# Patient Record
Sex: MALE | Race: WHITE | NOT HISPANIC OR LATINO | Employment: UNEMPLOYED | URBAN - METROPOLITAN AREA
[De-identification: names, ages, dates, MRNs, and addresses within clinical notes are randomized per-mention and may not be internally consistent; named-entity substitution may affect disease eponyms.]

---

## 2024-01-09 ENCOUNTER — HOSPITAL ENCOUNTER (INPATIENT)
Facility: CLINIC | Age: 49
LOS: 3 days | Discharge: HOME | End: 2024-01-12
Attending: FAMILY MEDICINE | Admitting: HOSPITALIST
Payer: MEDICAID

## 2024-01-09 DIAGNOSIS — F10.239 ALCOHOL DEPENDENCE WITH WITHDRAWAL WITH COMPLICATION (H): ICD-10-CM

## 2024-01-09 LAB
ALBUMIN SERPL BCG-MCNC: 5.1 G/DL (ref 3.5–5.2)
ALCOHOL BREATH TEST: 0 (ref 0–0.01)
ALP SERPL-CCNC: 86 U/L (ref 40–150)
ALT SERPL W P-5'-P-CCNC: 134 U/L (ref 0–70)
AMPHETAMINES UR QL SCN: NORMAL
ANION GAP SERPL CALCULATED.3IONS-SCNC: 12 MMOL/L (ref 7–15)
AST SERPL W P-5'-P-CCNC: 116 U/L (ref 0–45)
BARBITURATES UR QL SCN: NORMAL
BASOPHILS # BLD AUTO: 0 10E3/UL (ref 0–0.2)
BASOPHILS NFR BLD AUTO: 0 %
BENZODIAZ UR QL SCN: NORMAL
BILIRUB SERPL-MCNC: 0.4 MG/DL
BUN SERPL-MCNC: 13.8 MG/DL (ref 6–20)
BZE UR QL SCN: NORMAL
CALCIUM SERPL-MCNC: 9.7 MG/DL (ref 8.6–10)
CANNABINOIDS UR QL SCN: NORMAL
CHLORIDE SERPL-SCNC: 94 MMOL/L (ref 98–107)
CREAT SERPL-MCNC: 0.78 MG/DL (ref 0.67–1.17)
DEPRECATED HCO3 PLAS-SCNC: 27 MMOL/L (ref 22–29)
EGFRCR SERPLBLD CKD-EPI 2021: >90 ML/MIN/1.73M2
EOSINOPHIL # BLD AUTO: 0 10E3/UL (ref 0–0.7)
EOSINOPHIL NFR BLD AUTO: 0 %
ERYTHROCYTE [DISTWIDTH] IN BLOOD BY AUTOMATED COUNT: 14.5 % (ref 10–15)
ETHANOL SERPL-MCNC: <0.01 G/DL
FENTANYL UR QL: NORMAL
GLUCOSE SERPL-MCNC: 94 MG/DL (ref 70–99)
HCT VFR BLD AUTO: 48.5 % (ref 40–53)
HGB BLD-MCNC: 17.1 G/DL (ref 13.3–17.7)
IMM GRANULOCYTES # BLD: 0 10E3/UL
IMM GRANULOCYTES NFR BLD: 0 %
LYMPHOCYTES # BLD AUTO: 0.4 10E3/UL (ref 0.8–5.3)
LYMPHOCYTES NFR BLD AUTO: 9 %
MAGNESIUM SERPL-MCNC: 1.8 MG/DL (ref 1.7–2.3)
MCH RBC QN AUTO: 33.1 PG (ref 26.5–33)
MCHC RBC AUTO-ENTMCNC: 35.3 G/DL (ref 31.5–36.5)
MCV RBC AUTO: 94 FL (ref 78–100)
MONOCYTES # BLD AUTO: 0.6 10E3/UL (ref 0–1.3)
MONOCYTES NFR BLD AUTO: 14 %
NEUTROPHILS # BLD AUTO: 3.1 10E3/UL (ref 1.6–8.3)
NEUTROPHILS NFR BLD AUTO: 77 %
NRBC # BLD AUTO: 0 10E3/UL
NRBC BLD AUTO-RTO: 0 /100
OPIATES UR QL SCN: NORMAL
PCP QUAL URINE (ROCHE): NORMAL
PLATELET # BLD AUTO: 119 10E3/UL (ref 150–450)
POTASSIUM SERPL-SCNC: 4.3 MMOL/L (ref 3.4–5.3)
PROT SERPL-MCNC: 8.4 G/DL (ref 6.4–8.3)
RBC # BLD AUTO: 5.17 10E6/UL (ref 4.4–5.9)
SODIUM SERPL-SCNC: 133 MMOL/L (ref 135–145)
WBC # BLD AUTO: 4 10E3/UL (ref 4–11)

## 2024-01-09 PROCEDURE — 80053 COMPREHEN METABOLIC PANEL: CPT | Performed by: FAMILY MEDICINE

## 2024-01-09 PROCEDURE — 85025 COMPLETE CBC W/AUTO DIFF WBC: CPT | Performed by: FAMILY MEDICINE

## 2024-01-09 PROCEDURE — 82075 ASSAY OF BREATH ETHANOL: CPT | Performed by: FAMILY MEDICINE

## 2024-01-09 PROCEDURE — 36415 COLL VENOUS BLD VENIPUNCTURE: CPT | Performed by: FAMILY MEDICINE

## 2024-01-09 PROCEDURE — 80307 DRUG TEST PRSMV CHEM ANLYZR: CPT | Performed by: FAMILY MEDICINE

## 2024-01-09 PROCEDURE — 84100 ASSAY OF PHOSPHORUS: CPT | Performed by: HOSPITALIST

## 2024-01-09 PROCEDURE — 82077 ASSAY SPEC XCP UR&BREATH IA: CPT | Performed by: FAMILY MEDICINE

## 2024-01-09 PROCEDURE — 120N000002 HC R&B MED SURG/OB UMMC

## 2024-01-09 PROCEDURE — 250N000013 HC RX MED GY IP 250 OP 250 PS 637: Performed by: EMERGENCY MEDICINE

## 2024-01-09 PROCEDURE — 83735 ASSAY OF MAGNESIUM: CPT | Performed by: FAMILY MEDICINE

## 2024-01-09 PROCEDURE — 250N000013 HC RX MED GY IP 250 OP 250 PS 637: Performed by: FAMILY MEDICINE

## 2024-01-09 PROCEDURE — 99285 EMERGENCY DEPT VISIT HI MDM: CPT | Mod: 25 | Performed by: FAMILY MEDICINE

## 2024-01-09 PROCEDURE — 99285 EMERGENCY DEPT VISIT HI MDM: CPT | Performed by: FAMILY MEDICINE

## 2024-01-09 RX ORDER — LEVOTHYROXINE SODIUM 75 UG/1
75 TABLET ORAL DAILY
COMMUNITY
End: 2024-02-14 | Stop reason: DRUGHIGH

## 2024-01-09 RX ORDER — AMOXICILLIN 250 MG
1 CAPSULE ORAL 2 TIMES DAILY PRN
Status: DISCONTINUED | OUTPATIENT
Start: 2024-01-09 | End: 2024-01-12 | Stop reason: HOSPADM

## 2024-01-09 RX ORDER — NICOTINE 21 MG/24HR
1 PATCH, TRANSDERMAL 24 HOURS TRANSDERMAL DAILY
Status: DISCONTINUED | OUTPATIENT
Start: 2024-01-09 | End: 2024-01-12 | Stop reason: HOSPADM

## 2024-01-09 RX ORDER — GABAPENTIN 300 MG/1
600 CAPSULE ORAL EVERY 8 HOURS
Status: DISCONTINUED | OUTPATIENT
Start: 2024-01-13 | End: 2024-01-12 | Stop reason: HOSPADM

## 2024-01-09 RX ORDER — GABAPENTIN 100 MG/1
100 CAPSULE ORAL EVERY 8 HOURS
Status: DISCONTINUED | OUTPATIENT
Start: 2024-01-17 | End: 2024-01-12 | Stop reason: HOSPADM

## 2024-01-09 RX ORDER — SERTRALINE HYDROCHLORIDE 100 MG/1
50 TABLET, FILM COATED ORAL DAILY
COMMUNITY
Start: 2023-01-09 | End: 2024-01-18

## 2024-01-09 RX ORDER — AMOXICILLIN 250 MG
2 CAPSULE ORAL 2 TIMES DAILY PRN
Status: DISCONTINUED | OUTPATIENT
Start: 2024-01-09 | End: 2024-01-12 | Stop reason: HOSPADM

## 2024-01-09 RX ORDER — DIAZEPAM 5 MG
5-20 TABLET ORAL EVERY 30 MIN PRN
Status: DISCONTINUED | OUTPATIENT
Start: 2024-01-09 | End: 2024-01-10

## 2024-01-09 RX ORDER — HALOPERIDOL 5 MG/ML
2.5-5 INJECTION INTRAMUSCULAR EVERY 6 HOURS PRN
Status: DISCONTINUED | OUTPATIENT
Start: 2024-01-09 | End: 2024-01-12 | Stop reason: HOSPADM

## 2024-01-09 RX ORDER — CALCIUM CARBONATE 500 MG/1
1000 TABLET, CHEWABLE ORAL 4 TIMES DAILY PRN
Status: DISCONTINUED | OUTPATIENT
Start: 2024-01-09 | End: 2024-01-12 | Stop reason: HOSPADM

## 2024-01-09 RX ORDER — CLONIDINE HYDROCHLORIDE 0.1 MG/1
0.1 TABLET ORAL ONCE
Status: COMPLETED | OUTPATIENT
Start: 2024-01-09 | End: 2024-01-09

## 2024-01-09 RX ORDER — OLANZAPINE 5 MG/1
5-10 TABLET, ORALLY DISINTEGRATING ORAL EVERY 6 HOURS PRN
Status: DISCONTINUED | OUTPATIENT
Start: 2024-01-09 | End: 2024-01-12 | Stop reason: HOSPADM

## 2024-01-09 RX ORDER — ACETAMINOPHEN 650 MG/1
650 SUPPOSITORY RECTAL EVERY 4 HOURS PRN
Status: DISCONTINUED | OUTPATIENT
Start: 2024-01-09 | End: 2024-01-12 | Stop reason: HOSPADM

## 2024-01-09 RX ORDER — ACETAMINOPHEN 325 MG/1
650 TABLET ORAL EVERY 4 HOURS PRN
Status: DISCONTINUED | OUTPATIENT
Start: 2024-01-09 | End: 2024-01-12 | Stop reason: HOSPADM

## 2024-01-09 RX ORDER — ONDANSETRON 2 MG/ML
4 INJECTION INTRAMUSCULAR; INTRAVENOUS EVERY 6 HOURS PRN
Status: DISCONTINUED | OUTPATIENT
Start: 2024-01-09 | End: 2024-01-12 | Stop reason: HOSPADM

## 2024-01-09 RX ORDER — ONDANSETRON 4 MG/1
4 TABLET, ORALLY DISINTEGRATING ORAL EVERY 6 HOURS PRN
Status: DISCONTINUED | OUTPATIENT
Start: 2024-01-09 | End: 2024-01-12 | Stop reason: HOSPADM

## 2024-01-09 RX ORDER — GABAPENTIN 300 MG/1
900 CAPSULE ORAL EVERY 8 HOURS
Status: DISCONTINUED | OUTPATIENT
Start: 2024-01-10 | End: 2024-01-12 | Stop reason: HOSPADM

## 2024-01-09 RX ORDER — VENLAFAXINE 37.5 MG/1
TABLET ORAL
COMMUNITY
Start: 2023-01-09 | End: 2024-01-26

## 2024-01-09 RX ORDER — GABAPENTIN 300 MG/1
300 CAPSULE ORAL EVERY 8 HOURS
Status: DISCONTINUED | OUTPATIENT
Start: 2024-01-15 | End: 2024-01-12 | Stop reason: HOSPADM

## 2024-01-09 RX ORDER — TESTOSTERONE CYPIONATE 200 MG/ML
200 INJECTION, SOLUTION INTRAMUSCULAR
COMMUNITY
End: 2024-02-26

## 2024-01-09 RX ORDER — FLUMAZENIL 0.1 MG/ML
0.2 INJECTION, SOLUTION INTRAVENOUS
Status: DISCONTINUED | OUTPATIENT
Start: 2024-01-09 | End: 2024-01-12 | Stop reason: HOSPADM

## 2024-01-09 RX ORDER — DIAZEPAM 10 MG
10 TABLET ORAL ONCE
Status: COMPLETED | OUTPATIENT
Start: 2024-01-09 | End: 2024-01-09

## 2024-01-09 RX ORDER — VENLAFAXINE 37.5 MG/1
37.5 TABLET ORAL 2 TIMES DAILY WITH MEALS
Status: DISCONTINUED | OUTPATIENT
Start: 2024-01-10 | End: 2024-01-12 | Stop reason: HOSPADM

## 2024-01-09 RX ORDER — CLONIDINE HYDROCHLORIDE 0.1 MG/1
0.1 TABLET ORAL EVERY 8 HOURS
Status: DISCONTINUED | OUTPATIENT
Start: 2024-01-10 | End: 2024-01-12 | Stop reason: HOSPADM

## 2024-01-09 RX ORDER — LIDOCAINE 40 MG/G
CREAM TOPICAL
Status: DISCONTINUED | OUTPATIENT
Start: 2024-01-09 | End: 2024-01-12 | Stop reason: HOSPADM

## 2024-01-09 RX ORDER — MULTIPLE VITAMINS W/ MINERALS TAB 9MG-400MCG
1 TAB ORAL DAILY
Status: DISCONTINUED | OUTPATIENT
Start: 2024-01-10 | End: 2024-01-12 | Stop reason: HOSPADM

## 2024-01-09 RX ORDER — GABAPENTIN 600 MG/1
1200 TABLET ORAL ONCE
Status: COMPLETED | OUTPATIENT
Start: 2024-01-10 | End: 2024-01-10

## 2024-01-09 RX ORDER — FOLIC ACID 1 MG/1
1 TABLET ORAL DAILY
Status: DISCONTINUED | OUTPATIENT
Start: 2024-01-10 | End: 2024-01-12 | Stop reason: HOSPADM

## 2024-01-09 RX ADMIN — DIAZEPAM 10 MG: 10 TABLET ORAL at 15:55

## 2024-01-09 RX ADMIN — DIAZEPAM 10 MG: 5 TABLET ORAL at 18:58

## 2024-01-09 RX ADMIN — NICOTINE 1 PATCH: 21 PATCH, EXTENDED RELEASE TRANSDERMAL at 18:49

## 2024-01-09 RX ADMIN — DIAZEPAM 10 MG: 5 TABLET ORAL at 16:46

## 2024-01-09 RX ADMIN — DIAZEPAM 10 MG: 5 TABLET ORAL at 20:29

## 2024-01-09 RX ADMIN — CLONIDINE HYDROCHLORIDE 0.1 MG: 0.1 TABLET ORAL at 16:46

## 2024-01-09 ASSESSMENT — ACTIVITIES OF DAILY LIVING (ADL)
ADLS_ACUITY_SCORE: 35

## 2024-01-09 NOTE — ED PROVIDER NOTES
"    [facility] EMERGENCY DEPARTMENT ([facility])    1/09/24      ED PROVIDER NOTE   History     Chief Complaint   Patient presents with    Alcohol Problem    Anxiety     The history is provided by the patient and medical records.     Jose Alberto Cuellar is a 48 year old male who presents seeking detox from alcohol.  Patient states he has been drinking daily for some time.  Since New Year's Anamaria his drinking has been \"out of control\".  He states he has racing thoughts which he treats by drinking.  When he stops drinking he gets worse.  He starts to shake and sweat feel restless.  He then drinks again.  States on New Year's Anamaria he had an unintentional opiate overdose but denies that he is a regular drug user, states that is the first and only time he does use drugs and \"a long time\".  Is suffering from anxiety and depression, is treated with Zoloft and Effexor but does not always take these when using alcohol.  Denies any suicidal thoughts, denies any homicidal thoughts, denies any delusions paranoia or hallucinations.  He has no history of DTs or seizures.    Past Medical History  History reviewed. No pertinent past medical history.  History reviewed. No pertinent surgical history.  sertraline (ZOLOFT) 100 MG tablet  venlafaxine (EFFEXOR) 37.5 MG tablet      Allergies   Allergen Reactions    Penicillins Angioedema and Rash     Converted from Drug Class Allergy: Penicillins     Family History  History reviewed. No pertinent family history.  Social History   Social History     Tobacco Use    Smoking status: Every Day     Types: Cigarettes    Smokeless tobacco: Never   Substance Use Topics    Alcohol use: Yes     Comment: Vodka    Drug use: Not Currently         A medically appropriate review of systems was performed with pertinent positives and negatives noted in the HPI, and all other systems negative.    Physical Exam   BP: (!) 196/103  Pulse: 100  Temp: 98.2  F (36.8  C)  Resp: 14  Height: 175.3 cm (5' " "9\")  Weight: 99.8 kg (220 lb)  SpO2: 100 %  Physical Exam  Vitals and nursing note reviewed.   Constitutional:       General: He is not in acute distress.     Appearance: Normal appearance. He is not toxic-appearing.   HENT:      Head: Atraumatic.   Eyes:      General: No scleral icterus.     Conjunctiva/sclera: Conjunctivae normal.   Cardiovascular:      Rate and Rhythm: Normal rate.      Heart sounds: Normal heart sounds.   Pulmonary:      Effort: Pulmonary effort is normal. No respiratory distress.      Breath sounds: Normal breath sounds.   Abdominal:      Palpations: Abdomen is soft.      Tenderness: There is no abdominal tenderness.   Musculoskeletal:         General: No deformity.      Cervical back: Neck supple.   Skin:     General: Skin is warm.   Neurological:      General: No focal deficit present.      Mental Status: He is alert and oriented to person, place, and time.           ED Course, Procedures, & Data      Procedures                 Results for orders placed or performed during the hospital encounter of 01/09/24   Comprehensive metabolic panel     Status: Abnormal   Result Value Ref Range    Sodium 133 (L) 135 - 145 mmol/L    Potassium 4.3 3.4 - 5.3 mmol/L    Carbon Dioxide (CO2) 27 22 - 29 mmol/L    Anion Gap 12 7 - 15 mmol/L    Urea Nitrogen 13.8 6.0 - 20.0 mg/dL    Creatinine 0.78 0.67 - 1.17 mg/dL    GFR Estimate >90 >60 mL/min/1.73m2    Calcium 9.7 8.6 - 10.0 mg/dL    Chloride 94 (L) 98 - 107 mmol/L    Glucose 94 70 - 99 mg/dL    Alkaline Phosphatase 86 40 - 150 U/L     (H) 0 - 45 U/L     (H) 0 - 70 U/L    Protein Total 8.4 (H) 6.4 - 8.3 g/dL    Albumin 5.1 3.5 - 5.2 g/dL    Bilirubin Total 0.4 <=1.2 mg/dL   Magnesium     Status: Normal   Result Value Ref Range    Magnesium 1.8 1.7 - 2.3 mg/dL   CBC with platelets and differential     Status: Abnormal   Result Value Ref Range    WBC Count 4.0 4.0 - 11.0 10e3/uL    RBC Count 5.17 4.40 - 5.90 10e6/uL    Hemoglobin 17.1 13.3 - 17.7 " g/dL    Hematocrit 48.5 40.0 - 53.0 %    MCV 94 78 - 100 fL    MCH 33.1 (H) 26.5 - 33.0 pg    MCHC 35.3 31.5 - 36.5 g/dL    RDW 14.5 10.0 - 15.0 %    Platelet Count 119 (L) 150 - 450 10e3/uL    % Neutrophils 77 %    % Lymphocytes 9 %    % Monocytes 14 %    % Eosinophils 0 %    % Basophils 0 %    % Immature Granulocytes 0 %    NRBCs per 100 WBC 0 <1 /100    Absolute Neutrophils 3.1 1.6 - 8.3 10e3/uL    Absolute Lymphocytes 0.4 (L) 0.8 - 5.3 10e3/uL    Absolute Monocytes 0.6 0.0 - 1.3 10e3/uL    Absolute Eosinophils 0.0 0.0 - 0.7 10e3/uL    Absolute Basophils 0.0 0.0 - 0.2 10e3/uL    Absolute Immature Granulocytes 0.0 <=0.4 10e3/uL    Absolute NRBCs 0.0 10e3/uL   Ethyl Alcohol Level     Status: Normal   Result Value Ref Range    Alcohol ethyl <0.01 <=0.01 g/dL   Urine Drug Screen Panel     Status: Normal   Result Value Ref Range    Amphetamines Urine Screen Negative Screen Negative    Barbituates Urine Screen Negative Screen Negative    Benzodiazepine Urine Screen Negative Screen Negative    Cannabinoids Urine Screen Negative Screen Negative    Cocaine Urine Screen Negative Screen Negative    Fentanyl Qual Urine Screen Negative Screen Negative    Opiates Urine Screen Negative Screen Negative    PCP Urine Screen Negative Screen Negative   Alcohol breath test POCT     Status: Normal   Result Value Ref Range    Alcohol Breath Test 0.000 0.00 - 0.01   Urine Drug Screen     Status: Normal    Narrative    The following orders were created for panel order Urine Drug Screen.  Procedure                               Abnormality         Status                     ---------                               -----------         ------                     Urine Drug Screen Panel[[id number]]      Normal              Final result                 Please view results for these tests on the individual orders.   CBC with platelets differential     Status: Abnormal    Narrative    The following orders were created for panel order CBC with  platelets differential.  Procedure                               Abnormality         Status                     ---------                               -----------         ------                     CBC with platelets and d...[[id number]]  Abnormal            Final result                 Please view results for these tests on the individual orders.     Medications   diazepam (VALIUM) tablet 5-20 mg (10 mg Oral $Given 1/9/24 1516)   diazepam (VALIUM) tablet 10 mg (10 mg Oral $Given 1/9/24 9907)   cloNIDine (CATAPRES) tablet 0.1 mg (0.1 mg Oral $Given 1/9/24 1646)     Labs Ordered and Resulted from Time of ED Arrival to Time of ED Departure   COMPREHENSIVE METABOLIC PANEL - Abnormal       Result Value    Sodium 133 (*)     Potassium 4.3      Carbon Dioxide (CO2) 27      Anion Gap 12      Urea Nitrogen 13.8      Creatinine 0.78      GFR Estimate >90      Calcium 9.7      Chloride 94 (*)     Glucose 94      Alkaline Phosphatase 86       (*)      (*)     Protein Total 8.4 (*)     Albumin 5.1      Bilirubin Total 0.4     CBC WITH PLATELETS AND DIFFERENTIAL - Abnormal    WBC Count 4.0      RBC Count 5.17      Hemoglobin 17.1      Hematocrit 48.5      MCV 94      MCH 33.1 (*)     MCHC 35.3      RDW 14.5      Platelet Count 119 (*)     % Neutrophils 77      % Lymphocytes 9      % Monocytes 14      % Eosinophils 0      % Basophils 0      % Immature Granulocytes 0      NRBCs per 100 WBC 0      Absolute Neutrophils 3.1      Absolute Lymphocytes 0.4 (*)     Absolute Monocytes 0.6      Absolute Eosinophils 0.0      Absolute Basophils 0.0      Absolute Immature Granulocytes 0.0      Absolute NRBCs 0.0     MAGNESIUM - Normal    Magnesium 1.8     ETHYL ALCOHOL LEVEL - Normal    Alcohol ethyl <0.01     URINE DRUG SCREEN PANEL - Normal    Amphetamines Urine Screen Negative      Barbituates Urine Screen Negative      Benzodiazepine Urine Screen Negative      Cannabinoids Urine Screen Negative      Cocaine Urine Screen  Negative      Fentanyl Qual Urine Screen Negative      Opiates Urine Screen Negative      PCP Urine Screen Negative     ALCOHOL BREATH TEST POCT - Normal    Alcohol Breath Test 0.000       No orders to display          Critical care was not performed.     Medical Decision Making  The patient's presentation was of high complexity (a chronic illness severe exacerbation, progression, or side effect of treatment).    The patient's evaluation involved:  ordering and/or review of 3+ test(s) in this encounter (see separate area of note for details)    The patient's management necessitated moderate risk (prescription drug management including medications given in the ED) and high risk (a decision regarding hospitalization).    Assessment & Plan    48-year-old male with a history of alcohol dependence and polysubstance abuse, anxiety, depression presenting seeking detox from alcohol.  Has been binge drinking for several weeks, particularly severe in the last 8 days.  Had an unintentional opiate overdose on New Year's Anamaria, but denies that he is a regular user of opiates.  Suffers from significant anxiety but denying any suicidal or psychotic symptoms.  On exam he is hypertensive and tachycardic, has a slight tremor, appears to be experiencing symptoms of early alcohol withdrawal.  His physical exam is otherwise unremarkable.  He is cooperative and pleasant, does not appear paranoid or delusional and denies suicidal thoughts.  Labs do not show significant metabolic abnormality, only sequelae of chronic alcohol use.  He does have elevated blood pressure is being treated with Valium and clonidine appears to be improving.  Will continue to monitor.  He appears to be at an appropriate medical candidate for voluntary detox admission.      I have reviewed the nursing notes. I have reviewed the findings, diagnosis, plan and need for follow up with the patient.    New Prescriptions    No medications on file       Final diagnoses:    Alcohol dependence with withdrawal with complication (H)       Stevie Hawley MD  [facility] EMERGENCY DEPARTMENT  1/9/2024     Stevie Hawley MD  01/09/24 1862

## 2024-01-09 NOTE — ED TRIAGE NOTES
"      Pt appears anxious in triage; hx of anxiety; states \"my head feels empty,  and full at the same time\"  states he has been drinking to just shut his mind off and go to sleep.     Increase ETOH consumption.Denies any other drug.     Admits to occasional passive SI thoughts with no plan  "

## 2024-01-10 LAB
ANION GAP SERPL CALCULATED.3IONS-SCNC: 9 MMOL/L (ref 7–15)
BUN SERPL-MCNC: 17.7 MG/DL (ref 6–20)
CALCIUM SERPL-MCNC: 9.1 MG/DL (ref 8.6–10)
CHLORIDE SERPL-SCNC: 99 MMOL/L (ref 98–107)
CREAT SERPL-MCNC: 0.82 MG/DL (ref 0.67–1.17)
DEPRECATED HCO3 PLAS-SCNC: 27 MMOL/L (ref 22–29)
EGFRCR SERPLBLD CKD-EPI 2021: >90 ML/MIN/1.73M2
ERYTHROCYTE [DISTWIDTH] IN BLOOD BY AUTOMATED COUNT: 14.6 % (ref 10–15)
GLUCOSE SERPL-MCNC: 97 MG/DL (ref 70–99)
HCT VFR BLD AUTO: 45.2 % (ref 40–53)
HGB BLD-MCNC: 15.6 G/DL (ref 13.3–17.7)
INR PPP: 1.03 (ref 0.85–1.15)
MAGNESIUM SERPL-MCNC: 1.9 MG/DL (ref 1.7–2.3)
MCH RBC QN AUTO: 33 PG (ref 26.5–33)
MCHC RBC AUTO-ENTMCNC: 34.5 G/DL (ref 31.5–36.5)
MCV RBC AUTO: 96 FL (ref 78–100)
PHOSPHATE SERPL-MCNC: 3.4 MG/DL (ref 2.5–4.5)
PLATELET # BLD AUTO: 86 10E3/UL (ref 150–450)
POTASSIUM SERPL-SCNC: 4 MMOL/L (ref 3.4–5.3)
RBC # BLD AUTO: 4.73 10E6/UL (ref 4.4–5.9)
SODIUM SERPL-SCNC: 135 MMOL/L (ref 135–145)
WBC # BLD AUTO: 2.2 10E3/UL (ref 4–11)

## 2024-01-10 PROCEDURE — 250N000009 HC RX 250: Performed by: HOSPITALIST

## 2024-01-10 PROCEDURE — 250N000013 HC RX MED GY IP 250 OP 250 PS 637: Performed by: INTERNAL MEDICINE

## 2024-01-10 PROCEDURE — 250N000013 HC RX MED GY IP 250 OP 250 PS 637: Performed by: HOSPITALIST

## 2024-01-10 PROCEDURE — 99207 PR APP CREDIT; MD BILLING SHARED VISIT: CPT | Performed by: INTERNAL MEDICINE

## 2024-01-10 PROCEDURE — HZ2ZZZZ DETOXIFICATION SERVICES FOR SUBSTANCE ABUSE TREATMENT: ICD-10-PCS | Performed by: INTERNAL MEDICINE

## 2024-01-10 PROCEDURE — 80048 BASIC METABOLIC PNL TOTAL CA: CPT | Performed by: HOSPITALIST

## 2024-01-10 PROCEDURE — 36415 COLL VENOUS BLD VENIPUNCTURE: CPT | Performed by: HOSPITALIST

## 2024-01-10 PROCEDURE — 85610 PROTHROMBIN TIME: CPT | Performed by: HOSPITALIST

## 2024-01-10 PROCEDURE — 85027 COMPLETE CBC AUTOMATED: CPT | Performed by: HOSPITALIST

## 2024-01-10 PROCEDURE — 83735 ASSAY OF MAGNESIUM: CPT | Performed by: HOSPITALIST

## 2024-01-10 PROCEDURE — 99223 1ST HOSP IP/OBS HIGH 75: CPT | Performed by: HOSPITALIST

## 2024-01-10 PROCEDURE — 258N000003 HC RX IP 258 OP 636: Performed by: HOSPITALIST

## 2024-01-10 PROCEDURE — 250N000011 HC RX IP 250 OP 636: Performed by: HOSPITALIST

## 2024-01-10 PROCEDURE — 120N000002 HC R&B MED SURG/OB UMMC

## 2024-01-10 RX ORDER — HYDRALAZINE HYDROCHLORIDE 25 MG/1
25 TABLET, FILM COATED ORAL 4 TIMES DAILY PRN
Status: DISCONTINUED | OUTPATIENT
Start: 2024-01-10 | End: 2024-01-12 | Stop reason: HOSPADM

## 2024-01-10 RX ORDER — DIAZEPAM 10 MG
10 TABLET ORAL EVERY 30 MIN PRN
Status: DISCONTINUED | OUTPATIENT
Start: 2024-01-10 | End: 2024-01-12 | Stop reason: HOSPADM

## 2024-01-10 RX ORDER — AMLODIPINE BESYLATE 5 MG/1
5 TABLET ORAL ONCE
Status: COMPLETED | OUTPATIENT
Start: 2024-01-10 | End: 2024-01-10

## 2024-01-10 RX ORDER — DIAZEPAM 10 MG/2ML
5-10 INJECTION, SOLUTION INTRAMUSCULAR; INTRAVENOUS EVERY 30 MIN PRN
Status: DISCONTINUED | OUTPATIENT
Start: 2024-01-10 | End: 2024-01-12 | Stop reason: HOSPADM

## 2024-01-10 RX ADMIN — AMLODIPINE BESYLATE 5 MG: 5 TABLET ORAL at 15:30

## 2024-01-10 RX ADMIN — GABAPENTIN 900 MG: 300 CAPSULE ORAL at 15:30

## 2024-01-10 RX ADMIN — Medication 1 TABLET: at 08:30

## 2024-01-10 RX ADMIN — THIAMINE HCL TAB 100 MG 100 MG: 100 TAB at 08:31

## 2024-01-10 RX ADMIN — VENLAFAXINE 37.5 MG: 37.5 TABLET ORAL at 18:30

## 2024-01-10 RX ADMIN — FOLIC ACID 1 MG: 1 TABLET ORAL at 08:31

## 2024-01-10 RX ADMIN — DIAZEPAM 10 MG: 5 TABLET ORAL at 06:39

## 2024-01-10 RX ADMIN — NICOTINE 1 PATCH: 21 PATCH, EXTENDED RELEASE TRANSDERMAL at 08:31

## 2024-01-10 RX ADMIN — THIAMINE HYDROCHLORIDE: 100 INJECTION, SOLUTION INTRAMUSCULAR; INTRAVENOUS at 01:32

## 2024-01-10 RX ADMIN — Medication 75 MCG: at 08:31

## 2024-01-10 RX ADMIN — GABAPENTIN 900 MG: 300 CAPSULE ORAL at 08:30

## 2024-01-10 RX ADMIN — HYDRALAZINE HYDROCHLORIDE 25 MG: 25 TABLET, FILM COATED ORAL at 18:30

## 2024-01-10 RX ADMIN — VENLAFAXINE 37.5 MG: 37.5 TABLET ORAL at 09:17

## 2024-01-10 RX ADMIN — CLONIDINE HYDROCHLORIDE 0.1 MG: 0.1 TABLET ORAL at 00:30

## 2024-01-10 RX ADMIN — SERTRALINE HYDROCHLORIDE 50 MG: 50 TABLET ORAL at 08:31

## 2024-01-10 RX ADMIN — DIAZEPAM 10 MG: 5 TABLET ORAL at 00:39

## 2024-01-10 RX ADMIN — GABAPENTIN 1200 MG: 600 TABLET, FILM COATED ORAL at 00:30

## 2024-01-10 RX ADMIN — CLONIDINE HYDROCHLORIDE 0.1 MG: 0.1 TABLET ORAL at 08:30

## 2024-01-10 RX ADMIN — CLONIDINE HYDROCHLORIDE 0.1 MG: 0.1 TABLET ORAL at 15:30

## 2024-01-10 ASSESSMENT — LIFESTYLE VARIABLES
HEADACHE, FULLNESS IN HEAD: NOT PRESENT
TREMOR: 2
TOTAL SCORE: 9
VISUAL DISTURBANCES: NOT PRESENT
ORIENTATION AND CLOUDING OF SENSORIUM: ORIENTED AND CAN DO SERIAL ADDITIONS
AUDITORY DISTURBANCES: NOT PRESENT
ANXIETY: MODERATELY ANXIOUS, OR GUARDED, SO ANXIETY IS INFERRED
PAROXYSMAL SWEATS: BARELY PERCEPTIBLE SWEATING, PALMS MOIST
AGITATION: 2
NAUSEA AND VOMITING: NO NAUSEA AND NO VOMITING

## 2024-01-10 ASSESSMENT — ACTIVITIES OF DAILY LIVING (ADL)
ADLS_ACUITY_SCORE: 35

## 2024-01-10 NOTE — PHARMACY-ADMISSION MEDICATION HISTORY
Pharmacy Intern Admission Medication History    Admission medication history is complete. The information provided in this note is only as accurate as the sources available at the time of the update.    Information Source(s): Patient via in-person    Pertinent Information:   Patient is unsure if he took his meds this morning.  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 Patient is unsure if he should be using more.    Changes made to PTA medication list:  Added:   Levothyroxine 75 mcg tab  Testosterone cypionate 200 mg/mL injection  Deleted: None  Changed:   Sertraline 100 mg tab -> 50 mg tab (Patient has been taking 1/2 tabs)    Allergies reviewed with patient and updates made in EHR: yes    Medication History Completed By: Nolan Cottrell 1/9/2024 6:31 PM    PTA Med List   Medication Sig Last Dose    levothyroxine (SYNTHROID/LEVOTHROID) 75 MCG tablet Take 75 mcg by mouth daily 1/9/2024 at AM    sertraline (ZOLOFT) 100 MG tablet Take 50 mg by mouth daily 1/9/2024 at AM    testosterone cypionate (DEPOTESTOSTERONE) 200 MG/ML injection Inject 200 mg into the muscle every 14 days Past Month    venlafaxine (EFFEXOR) 37.5 MG tablet TAKE 1 TABLET(37.5 MG) BY MOUTH TWICE DAILY WITH MEALS 1/9/2024 at AM

## 2024-01-10 NOTE — PROGRESS NOTES
EMR reviewed.   Seen and evaluated.   Doing well. CIWA 8-9. Bp high. Tremulous. Ambulatory.     See H and P by Jay Haynes MD for the details.     Changes:   - Amlod 5 mg po x 1 and prn hydralazine for sbp>160 added.   - follow-up CBC in am.   - Continue CIWA   - CTBRANDO Elizabeth RN, patient.     Dusty Hammer MD  [facility]  Contact information available via [facility] Paging/Directory     Vitals:    01/10/24 0029 01/10/24 0628 01/10/24 0829 01/10/24 1159   BP: (!) 173/91 (!) 159/99 (!) 172/100 (!) 173/119   Pulse: 82 83 78 85   Resp: 18 16 18 18   Temp: 98.2  F (36.8  C) 98  F (36.7  C) 97.9  F (36.6  C) 97.7  F (36.5  C)   TempSrc: Oral Oral Oral Oral   SpO2: 94% 95% 97% 98%   Weight:       Height:            CMP  Recent Labs   Lab 01/10/24  0734 01/10/24  0028 01/09/24  1607     --  133*   POTASSIUM 4.0  --  4.3   CHLORIDE 99  --  94*   CO2 27  --  27   ANIONGAP 9  --  12   GLC 97  --  94   BUN 17.7  --  13.8   CR 0.82  --  0.78   GFRESTIMATED >90  --  >90   ANTWON 9.1  --  9.7   MAG  --  1.9 1.8   PHOS  --   --  3.4   PROTTOTAL  --   --  8.4*   ALBUMIN  --   --  5.1   BILITOTAL  --   --  0.4   ALKPHOS  --   --  86   AST  --   --  116*   ALT  --   --  134*     CBC  Recent Labs   Lab 01/10/24  0734 01/09/24  1607   WBC 2.2* 4.0   RBC 4.73 5.17   HGB 15.6 17.1   HCT 45.2 48.5   MCV 96 94   MCH 33.0 33.1*   MCHC 34.5 35.3   RDW 14.6 14.5   PLT 86* 119*     INR  Recent Labs   Lab 01/10/24  0734   INR 1.03     Arterial Blood GasNo lab results found in last 7 days.

## 2024-01-10 NOTE — ED PROVIDER NOTES
Patient says he is drinking 1.75 L vodka daily for several months.  Last drink was 5 am. He also says he has been at the doctor in the past with bp in 170s without meds started and his bp will get into 200s when withdrawing.  Denies hx of withdrawal seizures or hallucinations. Intake says Dr. Bauer asking us to discuss case with triage md to discuss medical admission for detox.   Discussed with medicine triage md and they agree with admission to medicine based on bp.     Rosario Vail MD  01/09/24 2154       Rosario Vail MD  01/09/24 2154       Rosario Vail MD  01/09/24 2150

## 2024-01-10 NOTE — UTILIZATION REVIEW
Admission Status; Secondary Review Determination         Under the authority of the Utilization Management Committee, the utilization review process indicated a secondary review on the above patient.  The review outcome is based on review of the medical records, discussions with staff, and applying clinical experience noted on the date of the review.        (x)      Inpatient Status Appropriate - This patient's medical care is consistent with medical management for inpatient care and reasonable inpatient medical practice.       RATIONALE FOR DETERMINATION   The patient is a 48-year-old male admitted on 1/9/2024.  Patient comes in due to concerns for alcohol withdrawal.  Patient is exhibiting symptoms of withdrawal and was given benzodiazepine pain medication in the ED.  He has been drinking 2 L of hard alcohol daily for several months.  No history of seizures.  Patient is hypertensive.  Labs do show a low white count of 2.2 and platelet count of 86 which is lower than baseline and likely consistent with bone marrow suppression secondary to chronic alcohol use.  Based on likely high MS as a findings greater than 8-12 in order to justify use of diazepam, agree with current inpatient status.      The severity of illness, intensity of service provided, expected LOS and risk for adverse outcome make the care complex, high risk and appropriate for hospital admission.        The information on this document is developed by the utilization review team in order for the business office to ensure compliance.  This only denotes the appropriateness of proper admission status and does not reflect the quality of care rendered.         The definitions of Inpatient Status and Observation Status used in making the determination above are those provided in the CMS Coverage Manual, Chapter 1 and Chapter 6, section 70.4.      Sincerely,     Avinash Farris MD  Physician Advisor  Utilization Review/ Case Management  [facility]  Services.

## 2024-01-10 NOTE — ED NOTES
Attempted to give report to [address] [place] [address], Unit was not ready to take report.

## 2024-01-10 NOTE — ED NOTES
Attempted to give report to 8medsurg. Room waiting to be cleaned by housekeeping per staff. 8medsurg will call after 11PM or once room cleaned.

## 2024-01-10 NOTE — H&P
[facility]    History and Physical - Hospitalist Service, GOLD TEAM        Date of Admission:  1/9/2024    Assessment & Plan      Jose Alberto Cuellar is a 48 year old male admitted on 1/9/2024. He Has a history of alcohol use disorder, central hypogonadism, hypothyroidism, depression who presents with acute alcohol intoxication and concerns for acute alcohol withdrawal    Acute alcohol withdrawal: Patient exhibiting symptoms of withdrawal.  Is responded well to benzodiazepines.  He also received clonidine in the emergency department due to withdrawal symptoms as well as elevated blood pressures.  -[facility] protocol with Valium  -Gabapentin and clonidine  -Oral vitamins  2.  Alcohol use disorder: Patient has a long history of alcohol use disorder.  He has been drinking for many years and has been drinking almost 2 L of hard alcohol daily for the last several months.  No history of seizures.  Has never really been in treatment.  -Consider addiction medicine consult  -Would be a good candidate for MAT  3.  High blood pressure: Patient has a possible diagnosis of hypertension.  Blood pressures are elevated here on admission, though consistent with his withdrawal so difficult to discern what is what.  -Start with clonidine  -If his pressures remain high he may need occasional as needed doses but I will hold off on ordering these for now  4.  Thrombocytopenia: His platelet count is still low.  His last platelet count in our system is from a year and a half ago when it is low normal.  -Repeat CBC, INR in the morning.  If remains concerning, may need evaluation for portal hypertension and cirrhosis  5.  Depression: Difficult to ascertain mood this time.  Patient denies suicidal ideation.  -Continue prior to admission sertraline and Effexor  6.  Hypothyroidism: Continue prior to admission Synthroid        Diet: Combination Diet Regular Diet Adult    DVT Prophylaxis: Pneumatic  "Compression Devices  La Catheter: Not present  Lines: None     Cardiac Monitoring: None  Code Status: Full Code      Clinically Significant Risk Factors Present on Admission                # Thrombocytopenia: Lowest platelets = 119 in last 2 days, will monitor for bleeding        # Obesity: Estimated body mass index is 32.49 kg/m  as calculated from the following:    Height as of this encounter: 1.753 m (5' 9\").    Weight as of this encounter: 99.8 kg (220 lb).              Disposition Plan  -once he is through his withdrawal..  When he is able to discuss the case with you.  Addiction medicine or has a connection to outpatient services he could be discharged with to his prior living arrangement     Expected Discharge Date: 01/11/2024                  Jay Haynes MD  Hospitalist Service, [facility]  Securely message with [employer] (more info)  Text page via [facility] Paging/Directory   See signed in provider for up to date coverage information    ______________________________________________________________________    Chief Complaint   Feeling poorly    History is obtained from the patient    History of Present Illness   Jose Alberto Cuellar is a 48 year old male who with a past medical history of alcohol use disorder, depression who presented to the [place] emergency department with a feeling of restlessness and concern for alcohol withdrawal.    Patient states that he has been drinking, \"way too much\" the last few weeks.  He states that he has been consuming almost 2 L of hard alcohol a day for the last several months.  Per report, he also had an unintentional opiate overdose on New Year's Anamaria.    Currently, patient feels a little restless.  Denies any fevers, chills but he is nauseous but denies any vomiting.      Past Medical History    History reviewed. No pertinent past medical history.    Past Surgical History   History reviewed. No pertinent " surgical history.    Prior to Admission Medications   Prior to Admission Medications   Prescriptions Last Dose Informant Patient Reported? Taking?   levothyroxine (SYNTHROID/LEVOTHROID) 75 MCG tablet 1/9/2024 at AM  Yes Yes   Sig: Take 75 mcg by mouth daily   sertraline (ZOLOFT) 100 MG tablet 1/9/2024 at AM  Yes Yes   Sig: Take 50 mg by mouth daily   testosterone cypionate (DEPOTESTOSTERONE) 200 MG/ML injection Past Month  Yes Yes   Sig: Inject 200 mg into the muscle every 14 days   venlafaxine (EFFEXOR) 37.5 MG tablet 1/9/2024 at AM  Yes Yes   Sig: TAKE 1 TABLET(37.5 MG) BY MOUTH TWICE DAILY WITH MEALS      Facility-Administered Medications: None           Physical Exam   Vital Signs: Temp: 98.5  F (36.9  C) Temp src: Oral BP: (!) 179/90 Pulse: 95   Resp: 18 SpO2: 96 % O2 Device: None (Room air)    Weight: 220 lbs 0 oz    GEN: asleep but easily awakens  CHEST: CTA B  CV: RRR  ABD: soft  EXT: no edema  NEURO: shaky    Medical Decision Making       75 MINUTES SPENT BY ME on the date of service doing chart review, history, exam, documentation & further activities per the note.      Data     I have personally reviewed the following data over the past 24 hrs:    4.0  \   17.1   / 119 (L)     133 (L) 94 (L) 13.8 /  94   4.3 27 0.78 \     ALT: 134 (H) AST: 116 (H) AP: 86 TBILI: 0.4   ALB: 5.1 TOT PROTEIN: 8.4 (H) LIPASE: N/A

## 2024-01-11 ENCOUNTER — TELEPHONE (OUTPATIENT)
Dept: BEHAVIORAL HEALTH | Facility: CLINIC | Age: 49
End: 2024-01-11

## 2024-01-11 LAB
ERYTHROCYTE [DISTWIDTH] IN BLOOD BY AUTOMATED COUNT: 14.4 % (ref 10–15)
HCT VFR BLD AUTO: 45.4 % (ref 40–53)
HGB BLD-MCNC: 15.4 G/DL (ref 13.3–17.7)
MCH RBC QN AUTO: 33.1 PG (ref 26.5–33)
MCHC RBC AUTO-ENTMCNC: 33.9 G/DL (ref 31.5–36.5)
MCV RBC AUTO: 98 FL (ref 78–100)
PLATELET # BLD AUTO: 85 10E3/UL (ref 150–450)
RBC # BLD AUTO: 4.65 10E6/UL (ref 4.4–5.9)
WBC # BLD AUTO: 3.5 10E3/UL (ref 4–11)

## 2024-01-11 PROCEDURE — 250N000013 HC RX MED GY IP 250 OP 250 PS 637: Performed by: INTERNAL MEDICINE

## 2024-01-11 PROCEDURE — 99232 SBSQ HOSP IP/OBS MODERATE 35: CPT | Performed by: INTERNAL MEDICINE

## 2024-01-11 PROCEDURE — 250N000013 HC RX MED GY IP 250 OP 250 PS 637: Performed by: HOSPITALIST

## 2024-01-11 PROCEDURE — 120N000002 HC R&B MED SURG/OB UMMC

## 2024-01-11 PROCEDURE — 36415 COLL VENOUS BLD VENIPUNCTURE: CPT | Performed by: INTERNAL MEDICINE

## 2024-01-11 PROCEDURE — 85027 COMPLETE CBC AUTOMATED: CPT | Performed by: INTERNAL MEDICINE

## 2024-01-11 PROCEDURE — H0001 ALCOHOL AND/OR DRUG ASSESS: HCPCS | Performed by: COUNSELOR

## 2024-01-11 RX ORDER — AMLODIPINE BESYLATE 5 MG/1
5 TABLET ORAL DAILY
Status: DISCONTINUED | OUTPATIENT
Start: 2024-01-11 | End: 2024-01-12 | Stop reason: HOSPADM

## 2024-01-11 RX ADMIN — VENLAFAXINE 37.5 MG: 37.5 TABLET ORAL at 18:17

## 2024-01-11 RX ADMIN — CLONIDINE HYDROCHLORIDE 0.1 MG: 0.1 TABLET ORAL at 08:51

## 2024-01-11 RX ADMIN — CLONIDINE HYDROCHLORIDE 0.1 MG: 0.1 TABLET ORAL at 15:45

## 2024-01-11 RX ADMIN — FOLIC ACID 1 MG: 1 TABLET ORAL at 08:51

## 2024-01-11 RX ADMIN — Medication 5 MG: at 00:11

## 2024-01-11 RX ADMIN — AMLODIPINE BESYLATE 5 MG: 5 TABLET ORAL at 12:39

## 2024-01-11 RX ADMIN — CLONIDINE HYDROCHLORIDE 0.1 MG: 0.1 TABLET ORAL at 23:04

## 2024-01-11 RX ADMIN — Medication 1 TABLET: at 08:51

## 2024-01-11 RX ADMIN — GABAPENTIN 900 MG: 300 CAPSULE ORAL at 00:10

## 2024-01-11 RX ADMIN — CLONIDINE HYDROCHLORIDE 0.1 MG: 0.1 TABLET ORAL at 00:11

## 2024-01-11 RX ADMIN — Medication 75 MCG: at 08:50

## 2024-01-11 RX ADMIN — THIAMINE HCL TAB 100 MG 100 MG: 100 TAB at 08:51

## 2024-01-11 RX ADMIN — VENLAFAXINE 37.5 MG: 37.5 TABLET ORAL at 08:51

## 2024-01-11 RX ADMIN — GABAPENTIN 900 MG: 300 CAPSULE ORAL at 23:04

## 2024-01-11 RX ADMIN — NICOTINE 1 PATCH: 21 PATCH, EXTENDED RELEASE TRANSDERMAL at 08:54

## 2024-01-11 RX ADMIN — SERTRALINE HYDROCHLORIDE 50 MG: 50 TABLET ORAL at 08:51

## 2024-01-11 RX ADMIN — GABAPENTIN 900 MG: 300 CAPSULE ORAL at 08:51

## 2024-01-11 RX ADMIN — DIAZEPAM 10 MG: 10 TABLET ORAL at 12:53

## 2024-01-11 RX ADMIN — DIAZEPAM 10 MG: 10 TABLET ORAL at 23:04

## 2024-01-11 RX ADMIN — Medication 5 MG: at 23:04

## 2024-01-11 ASSESSMENT — ACTIVITIES OF DAILY LIVING (ADL)
ADLS_ACUITY_SCORE: 35
DEPENDENT_IADLS:: INDEPENDENT

## 2024-01-11 NOTE — PLAN OF CARE
[id number]  Pt alert and oriented times 4, continent bowel and bladder and independent in the room. Able to make needs known, no acute event happened at thi time. His Last CIWA score was 6. There was no PRN medication for his CIWA score and provider notified.     Problem: Adult Inpatient Plan of Care  Goal: Plan of Care Review  Description: The Plan of Care Review/Shift note should be completed every shift.  The Outcome Evaluation is a brief statement about your assessment that the patient is improving, declining, or no change.  This information will be displayed automatically on your shift  note.  Flowsheets (Taken 1/10/2024 2151)  Plan of Care Reviewed With: patient  Overall Patient Progress: no change   Goal Outcome Evaluation:      Plan of Care Reviewed With: patient    Overall Patient Progress: no changeOverall Patient Progress: no change

## 2024-01-11 NOTE — TELEPHONE ENCOUNTER
1/11/2024  Pt is currently on [employer], 6MS. He is working on obtaining insurance through [employer]. He has already met with  Financial Counseling.    LP SCREEN TELEPHONE NOTE   Jose Alberto Cuellar paperwork was reviewed by ROWAN Juárez and the patient was deemed ELIGIBLE for the LP program.     Inpatient or Community Referral: Inpatient referral     Medical Screening (As Needed): Pt is currently in the hospital.     Regular use of benzodiazapine's (other than detox): The patient is ONLY using benzodiazepines while on the detox unit or other medical or mental health unit.     Insurance: The [facility] UR Department is responsible for obtaining ALL authorizations for treatment services at the EOP, DOP and LP levels of care.      This will be a: Seen by a [facility] Evaluation Counselor: CASEY, ISP & LP UPDATE NOTE evaluation. (Update SBAR and route DAANES and ANUPAM's)     IV use or Pregnant: This patient is not pregnant or an IV drug user.     Business office: [phone]     List: Pt needs to have insurance first.     Group: Men's Group or Mixed Group     Additional Info as needed: NA     The best current contact telephone number for the patient is: [phone]

## 2024-01-11 NOTE — CONSULTS
1/11/2024  Pt completed his JACKI CA today. He is agreeable towards attending an IP JACKI tx. He is currently working on his insurance. He was informed that he needs insurance to enter any tx program in [place]. He will be [facility] to [facility].    [id number]    Recommendations:   1)  Complete a residential based or similar treatment program.  2)  Abstain from all mood-altering chemicals unless prescribed by a licensed provider.   3)  Attend, at minimum, 2 weekly support group meetings, such as 12 step based (AA/NA), SMART Recovery, Health Realizations, and/or Refuge Recovery meetings.     4)  Actively work with a female mentor/sponsor on a weekly basis.   5)  Follow all the recommendations of your treatment/medical providers.    Clinical Substantiation:    Pt has a long history of alcohol use. He reports he has not used drugs in 10 years with the exception of snorting fentanyl while in a black out on New Years Anamaria. Pt would benefit from structure of an inpatient program since he has a tendency to isolate.    Referrals/ Alternatives:  [facility]  [address] [place] [address]  Admissions Phone: [phone]  [facility] waitlist: [phone]  [email or web address]     JACKI consult completed by: Suad Sotelo [facility].  Phone Number: [phone]  E-mail Address: [email or web address] Evaluation Department  [address] [place] [address]     *Due to regulation of Title 42 of the Code of Federal Regulations (CFR) Part 2: Confidentiality laws apply to this note and the information wherein.  Thus, this note cannot be copy and pasted into any other health care staff's note nor can it be included in general medical records sent to ANY outside agency without the patient's written consent.

## 2024-01-11 NOTE — PROGRESS NOTES
"[facility]    Medicine Progress Note - Hospitalist Service, GOLD TEAM 17    Date of Admission:  1/9/2024    Assessment & Plan      Jose Alberto Cuellar is a 48 year old male admitted on 1/9/2024. He Has a history of alcohol use disorder, central hypogonadism, hypothyroidism, depression who presents with acute alcohol intoxication and concerns for acute alcohol withdrawal    # Acute alcohol withdrawal:   # Alcohol use disorder : Moderate to severe.      Patient has a long history of alcohol use disorder.  He has been drinking for many years and has been drinking almost 2 L of hard alcohol daily for the last several months.  No history of seizures.  Has never really been in treatment.    -CIWA protocol with Valium  -Gabapentin and clonidine   -Oral multivitamin, thiamine, folic acid daily  - CD consultation  - SW consultation  - Alcohol abstinence    # High blood pressure: Patient has a possible diagnosis of hypertension.  Blood pressures are elevated here on admission, though consistent with his withdrawal so difficult to discern what is what.  -Continue clonidine  -Amlodipine 5 mg daily  -As needed hydralazine  -Monitor    # Leukopenia, thrombocytopenia:   -Likely secondary to bone marrow suppression 2/2 alcohol use.  Follow-up CBC.    # Depression: Patient denies suicidal ideation.  -Continue prior to admission sertraline and Effexor    #Hypothyroidism: Continue prior to admission Synthroid          Diet: Combination Diet Regular Diet Adult    DVT Prophylaxis: Pneumatic Compression Devices  La Catheter: Not present  Lines: None     Cardiac Monitoring: None  Code Status: Full Code      Clinically Significant Risk Factors                # Thrombocytopenia: Lowest platelets = 85 in last 2 days, will monitor for bleeding          # Obesity: Estimated body mass index is 33.79 kg/m  as calculated from the following:    Height as of this encounter: 1.753 m (5' 9\").    " Weight as of this encounter: 103.8 kg (228 lb 12.8 oz)., PRESENT ON ADMISSION            Disposition Plan     Expected Discharge Date: 01/11/2024      Destination: home;other (comment) (Pending chem dep assessment.)              Dusty Hammer MD  Hospitalist Service, GOLD TEAM 17  [facility]  Securely message with [employer] (more info)  Text page via [employer] Paging/Directory   See signed in provider for up to date coverage information  ______________________________________________________________________    Interval History   Interval events reviewed.   Overall doing better.  Alcohol withdrawal improving.  Denies fever or chills.   No cough or cp or sob.   No LH or dizziness.   No NV or pain abdomen.   Interested in alcohol treatment programs    No other new or acute medical concern     Physical Exam   Vital Signs: Temp: 97.8  F (36.6  C) Temp src: Oral BP: (!) 153/88 Pulse: 73   Resp: 16 SpO2: 96 % O2 Device: None (Room air)    Weight: 228 lbs 12.8 oz      General Appearance: Awake, interactive, NAD  Respiratory: Normal work of breathing.  Cardiovascular: RRR  GI: Soft. NT. ND.  Extremities: Distally wwp. No pedal edema  Skin: No acute rash on exposed areas.   Neuro: Grossly non focal.  Mild tremors  Others: Stable mood.      Medical Decision Making       40 MINUTES SPENT BY ME on the date of service doing chart review, history, exam, documentation & further activities per the note.      Data     I have personally reviewed the following data over the past 24 hrs:    3.5 (L)  \   15.4   / 85 (L)     N/A N/A N/A /  N/A   N/A N/A N/A \       Imaging results reviewed over the past 24 hrs:   No results found for this or any previous visit (from the past 24 hour(s)).  Recent Labs   Lab 01/11/24  0647 01/10/24  0734 01/09/24  1607   WBC 3.5* 2.2* 4.0   HGB 15.4 15.6 17.1   MCV 98 96 94   PLT 85* 86* 119*   INR  --  1.03  --    NA  --  135 133*   POTASSIUM  --  4.0 4.3   CHLORIDE   --  99 94*   CO2  --  27 27   BUN  --  17.7 13.8   CR  --  0.82 0.78   ANIONGAP  --  9 12   ANTWON  --  9.1 9.7   GLC  --  97 94   ALBUMIN  --   --  5.1   PROTTOTAL  --   --  8.4*   BILITOTAL  --   --  0.4   ALKPHOS  --   --  86   ALT  --   --  134*   AST  --   --  116*

## 2024-01-11 NOTE — CONSULTS
Care Management Initial Consult    General Information  Assessment completed with:  Jose Alberto Garcias  Type of CM/SW Visit: Offer D/C Planning    Primary Care Provider verified and updated as needed:  Yes. Dr Stevie Perez, [facility], [address] [place] [address]. Phone: [phone].    Readmission within the last 30 days: no previous admission in last 30 days   Reason for Consult: discharge planning  Advance Care Planning:          Communication Assessment  Patient's communication style: spoken language (English or Bilingual)         Cognitive  Cognitive/Neuro/Behavioral: WDL  Level of Consciousness: alert. Mood/Behavior: anxious          Living Environment:   People in home: alone     Current living Arrangements: apartment      Able to return to prior arrangements: yes     Family/Social Support:  Care provided by: self  Provides care for:         Description of Support System:  Parents       Current Resources:   Patient receiving home care services: No     Community Resources: None  Equipment currently used at home: none  Supplies currently used at home: None    Employment/Financial:  Employment Status:       Employment/ Comments: Was not in the .  Financial Concerns: insurance, [facility] Financial Counselor has already been in contact with pt.     Does the patient's insurance plan have a 3 day qualifying hospital stay waiver?  No. Pt does not have insurance.    Lifestyle & Psychosocial Needs:  Social Determinants of Health     Food Insecurity: Not on file   Depression: Not on file   Housing Stability: Not on file   Tobacco Use: High Risk (1/9/2024)    Patient History     Smoking Tobacco Use: Every Day     Smokeless Tobacco Use: Never     Passive Exposure: Not on file   Financial Resource Strain: Not on file   Alcohol Use: Not on file   Transportation Needs: Not on file   Physical Activity: Not on file   Interpersonal Safety: Not on file   Stress: Not on file   Social Connections: Not on file        Functional Status:  Prior to admission patient needed assistance:   Dependent ADLs: Independent  Dependent IADLs: Independent     Mental Health Status:  Mental Health Status: Hx of depression.       Chemical Dependency Status:  Chemical Dependency Status: Current Concern  Chemical Dependency Management: Not addressed at this visit. CD consult pending this admission.        Values/Beliefs:  Spiritual, Cultural Beliefs, Jehovah's witness Practices, Values that affect care:               Additional Information:  Pt presented to the ED seeking detox from alcohol; admitted with acute alcohol withdrawal. On admission BP elevated; platelet count low. Hx of depression & hypothyroidism. Per H&P, pt has been drinking almost 2 liters of hard alcohol daily for the last several months.   This afternoon I introduced myself to pt and explained I help with discharge planning. Pt awake & alert; sitting on edge of bed eating lunch. Pt reports he lives alone in an apartment. There are no steps; the bathroom has a tub/shower combination.   Confirmed pt's home address and phone number. Confirmed his mother, Brianna Cuellar is his emergency contact.   Pt does not have any medical equipment at home; was not receiving any home or outpt services prior to admission. Pt reports usually he can bathe & dress himself and manage his own meds, but for about 1 week prior to admission he was not doing well with this (due to the alcohol).   Pt reports his primary is Dr Perez at a health wellness clinic in [place], [place]. I did a search of Dr Perez: [facility], [facility], [address] [place] [address]. Phone: [phone].   No insurance: Pt said he was in the process of applying for insurance. Said he had a call from the hospital Financial Counselor & they gave him info on [place] Sure, in process of sending it in.   Pt asking when he can discharge. He will need to follow-up with his doctor and nurse. Pt said he will probably be able to get a  ride home at discharge.         Mignon Winter, RN Care Coordinator  [facility]  On 01- RNCC float [facility]. Contact the  Med/Surg CC if any questions, [phone].

## 2024-01-11 NOTE — PLAN OF CARE
Goal Outcome Evaluation:       Pt admitted with acute alcohol intoxication and concerns for acute alcohol withdrawal. Chem Dep consulted.   No insurance, Financial Counselor has already been in contact with pt.   RNCC will continue to follow for plan of care.              Mignon Winter, RN Care Coordinator  Float [facility]  On 1- RNCC float on [facility]. Call 6 Med/Surg RNCC if any questions, [phone].

## 2024-01-11 NOTE — PROGRESS NOTES
6MS ADMISSION    D: Patient admitted/transferred from [facility] ED for ETOH intoxication and Withdrawal.     I: Upon arrival to the unit patient was oriented to room, unit, and call light. Patient s height, weight, and vital signs were obtained. Allergies reviewed and allergy band applied. Provider notified of patient s arrival on the unit. Adult AVS completed. Head to toe assessment completed. Education assessment completed. Care plan initiated.    A: Vital signs stable upon admission. Patient denies pain. Two RN skin assessment completed Yes. Second RN was Tarah BURCIAGA No significant skin issues. [facility] Nurse Consult Ordered No. Bed Algorithm can be found in PCS flow sheets (Support Surface Algorithm) and on IP [facility] NURSE RESOURCE TAB, was this used during this assessment? No. Was a pulsate mattress ordered No.    P: Continue to monitor patient s BP and teremors, and intervene as needed. Continue with plan of care. Notify provider with any concerns or changes in patient status.

## 2024-01-11 NOTE — PROGRESS NOTES
"  Type Of Assessment: Inpatient Substance Use Comprehensive Assessment    Referral Source:  [facility]  MRN: [id number]    DATE OF SERVICE: January 11, 2024  Date of previous JACKI Assessment: age 35  Patient confirmed identity through two factor verification: Full Legal Name and SSN    PATIENT'S NAME: Jose Alberto Cuellar  Age: 48 year old  Last 4 SSN: [id number]  Sex: male   Gender Identity: male  Sexual Orientation: Heterosexual  Cultural Background: \"\"  YOB: 1975  Current Address:   [address] [place] [address]  Patient Phone Number:  [phone]   Patient's E-Mail Contact:  [email or web address]  Funding: none, working on [employer]  PMI: n/a  Emergency Contact: Extended Emergency Contact Information  Primary Emergency Contact: Brianna Cuellar  Mobile Phone: [phone]  Relation: Parent    DAANES information was provided to patient and patient does not want a copy.     START TIME: 1:02pm  END TIME: 1:42pm    As the provider I attest to compliance with applicable laws and regulations related to telemedicine.   Jose Alberto Cuellar was seen for a substance use disorder consult on 1/11/2024 by ROWAN Juárez.    Reason for Substance Use Disorder Consult:  \"I couldn't stop drinking. And it is effecting all aspects of my life.\"    Per 1/10/24 H&P:   History of Present Illness  Jose Alberto Cuellar is a 48 year old male who with a past medical history of alcohol use disorder, depression who presented to the [place] emergency department with a feeling of restlessness and concern for alcohol withdrawal.     Patient states that he has been drinking, \"way too much\" the last few weeks.  He states that he has been consuming almost 2 L of hard alcohol a day for the last several months.  Per report, he also had an unintentional opiate overdose on New Year's Anamaria.     Currently, patient feels a little restless.  Denies any fevers, chills but he is " nauseous but denies any vomiting.    Are you currently having severe withdrawal symptoms that are putting yourself or others in danger? No  Are you currently having severe medical problems that require immediate attention? No  Are you currently having severe emotional or behavioral problems that are putting yourself or others at risk of harm? No    Have you participated in prior substance use disorder evaluations? Yes. When, Where, and What circumstances: 35 years old   Have you ever been to detox, inpatient or outpatient treatment for substance related use? List previous treatment: Yes. When, Where, and What circumstances: IP once.   Have you ever had a gambling problem or had treatment for compulsive gambling? No  Have you ever felt the need to bet more and more money? No  Have you ever had to lie to people important to you about how much you gambled? No    Patient does not appear to be in severe withdrawal, an imminent safety risk to self or others, or requiring immediate medical attention and may proceed with the assessment interview.  Comprehensive Substance Use History   X X = Primary Drug Used Age of First Use    Pattern of Substance Use   (heaviest use in life and a use history within the past year if applicable) (DSM-5: Sx #3) Date /  Quantity of last use if within the past 30 days Withdrawal Potential?   Method of use  (Oral, smoked, snorted, IV, etc)   x Alcohol   15 Daily drinker for years.    Past year: drinking about 1.75L per day. He would drink 7-8 shooters throughout the day. He'd drink part of bottle at night and save the rest for the next day.   1/8/24 no oral    Marijuana/Hashish   17 No heavy use.   30 no smoke    Cocaine/Crack 19 Cocaine:  HU: between 27-30 years old.   [age] no snort    Meth/Amphetamines   18 HU: 36-38   37/38 no smoke    Heroin   [age] Once or twice.    30s no Smoke    Other Opiates/Synthetics   21 Fentanyl:  Used once 12/31/23    Opiates: different ones.  First use: 21  Last  "use: [age]  HU: 30-35   1/1/24 no snort    Inhalants  No use        Benzodiazepines   No use        Hallucinogens   college Mushrooms & LSD:  Used a few times.   [age] no oral    Barbiturates/Sedatives/Hypnotics   No use        Over-the-Counter Drugs   No use        Other   No use        Nicotine   24 Cigarettes: 1 ppd  Vapes: daily 1/9/24 no smoke     Withdrawal symptoms: Have you had any of the following withdrawal symptoms?  \"sweats, anxiety, and shakes, and I would gag a lot.\"    Have you experienced any cravings?  No    Have you had periods of abstinence?  Yes   What was your longest period? 5 years  Any circumstances that lead to relapse? \"I thought I could have a drink.\" This was during covid.    What activities have you engaged in when using alcohol/other drugs that could be hazardous to you or others?  The patient reported having a history of driving while under the influence of alcohol or drugs.    A description of any risk-taking behavior, including behavior that puts the client at risk of exposure to blood-borne or sexually transmitted diseases: none reported.    Arrests and legal interventions related to substance use: 2 DUIs in college. No current charges or probation.    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    Leisure time activities that are associated with substance use: he has been drinking alone.    Do you think your substance use has become a problem for you? He agrees he has a substance abuse problem.    MEDICAL HISTORY  Physical or medical concerns or diagnoses:   Patient Active Problem List   Diagnosis    Alcohol dependence with withdrawal with complication (H)     Do you have any current medical " treatment needs not being addressed by inpatient treatment?  no    Do you need a referral for a medical provider? No, [facility].    Current medications:   Current Facility-Administered Medications   Medication    acetaminophen (TYLENOL) tablet 650 mg    Or    acetaminophen (TYLENOL) Suppository 650 mg    amLODIPine (NORVASC) tablet 5 mg    calcium carbonate (TUMS) chewable tablet 1,000 mg    cloNIDine (CATAPRES) tablet 0.1 mg    diazepam (VALIUM) tablet 10 mg    Or    diazepam (VALIUM) injection 5-10 mg    flumazenil (ROMAZICON) injection 0.2 mg    folic acid (FOLVITE) tablet 1 mg    [START ON 1/17/2024] gabapentin (NEURONTIN) capsule 100 mg    [START ON 1/15/2024] gabapentin (NEURONTIN) capsule 300 mg    [START ON 1/13/2024] gabapentin (NEURONTIN) capsule 600 mg    gabapentin (NEURONTIN) capsule 900 mg    OLANZapine zydis (zyPREXA) ODT tab 5-10 mg    Or    haloperidol lactate (HALDOL) injection 2.5-5 mg    hydrALAZINE (APRESOLINE) tablet 25 mg    levothyroxine (SYNTHROID/LEVOTHROID) half-tab 75 mcg    lidocaine (LMX4) cream    lidocaine 1 % 0.1-1 mL    melatonin tablet 5 mg    multivitamin w/minerals (THERA-VIT-M) tablet 1 tablet    nicotine (NICODERM CQ) 21 MG/24HR 24 hr patch 1 patch    And    nicotine Patch in Place    ondansetron (ZOFRAN ODT) ODT tab 4 mg    Or    ondansetron (ZOFRAN) injection 4 mg    senna-docusate (SENOKOT-S/PERICOLACE) 8.6-50 MG per tablet 1 tablet    Or    senna-docusate (SENOKOT-S/PERICOLACE) 8.6-50 MG per tablet 2 tablet    sertraline (ZOLOFT) tablet 50 mg    sodium chloride (PF) 0.9% PF flush 3 mL    sodium chloride (PF) 0.9% PF flush 3 mL    thiamine (B-1) tablet 100 mg    venlafaxine (EFFEXOR) tablet 37.5 mg       Are you pregnant? NA, Male    Do you have any specific physical needs/accommodations? No    MENTAL HEALTH HISTORY:  Have you ever had  hospitalizations or treatment for mental health illness: No    Mental health history, including diagnosis and symptoms, and the  "effect on the client's ability to function: \"depression and anxiety.\"    Current mental health treatment including psychotropic medication needed to maintain stability: (Note: The assessment must utilize screening tools approved by the commissioner pursuant to section 245.4863 to identify whether the client screens positive for co-occurring disorders): none reported.    GAIN-SS Tool:      1/11/2024     1:00 PM   When was the last time that you had significant problems...   with feeling very trapped, lonely, sad, blue, depressed or hopeless about the future? Past month   with sleep trouble, such as bad dreams, sleeping restlessly, or falling asleep during the day? Past Month   with feeling very anxious, nervous, tense, scared, panicked or like something bad was going to happen? Past month   with becoming very distressed & upset when something reminded you of the past? Past month   with thinking about ending your life or committing suicide? Past month         1/11/2024     1:00 PM   When was the last time that you did the following things 2 or more times?   Lied or conned to get things you wanted or to avoid having to do something? 2 to 12 months ago   Had a hard time paying attention at school, work or home? Past month   Had a hard time listening to instructions at school, work or home? Past month   Were a bully or threatened other people? Never   Started physical fights with other people? Never     Have you ever been verbally, emotionally, physically or sexually abused?   No    Family history of substance use and misuse: \"my sister, a lot of relatives that I don't know that great on my dad's side.\"    The patient's desire for family involvement in the treatment program: \"no\"  Level of family support: \"good.\"    Social network in relation to expected support for recovery: He has attended AA meetings. He last attended in [place].    Are you currently in a significant relationship? No    Do you have any children (include " living arrangements/custody/contact)?:  no    What is your current living situation? Lives in an apartment alone.    Are you employed/attending school? No.    SUMMARY:  Ability to understand written treatment materials: Yes  Ability to understand patient rules and patient rights: Yes  Does the patient recognize needs related to substance use and is willing to follow treatment recommendations: Yes  Does the patient have an opioid use disorder:  has a history of opiate use and was give treatment options, including Medication Assisted Treatment, and information on the risks of opiod use disorder including recognizing and responding to opiod overdose.    ASAM Dimension Scale Ratings:    Dimension 1 -  Acute Intoxication/Withdrawal: 0 - No Problem  Dimension 2 - Biomedical: 0 - No Problem  Dimension 3 - Emotional/Behavioral/Cognitive Conditions: 2 - Moderate Problem  Dimension 4 - Readiness to Change:  2 - Moderate Problem  Dimension 5 - Relapse/Continued Use/ Continued Problem Potential: 4 - Extreme Problem  Dimension 6 - Recovery Environment:  4 - Extreme Problem    Category of Substance Severity (ICD-10 Code / DSM 5 Code)     Alcohol Use Disorder Severe  (10.20) (303.90)   Cannabis Use Disorder The patient does not meet the criteria for a Cannabis use disorder.   Hallucinogen Use Disorder The patient does not meet the criteria for a Hallucinogen use disorder.   Inhalant Use Disorder The patient does not meet the criteria for an Inhalant use disorder.   Opioid Use Disorder The patient does not meet the criteria for an Opioid use disorder.   Sedative, Hypnotic, or Anxiolytic Use Disorder The patient does not meet the criteria for a Sedative/Hypnotic use disorder.   Stimulant Related Disorder The patient does not meet the criteria for a Stimulant use disorder.   Tobacco Use Disorder Severe   (F17.200) (305.1)    Other (or unknown) Substance Use Disorder The patient does not meet the criteria for a Other (or unknown)  Substance use disorder.     A problematic pattern of alcohol/drug use leading to clinically significant impairment or distress, as manifested by at least two of the following, occurring within a 12-month period:    1.) Alcohol/drug is often taken in larger amounts or over a longer period than was intended.  2.) There is a persistent desire or unsuccessful efforts to cut down or control alcohol/drug use  3.) A great deal of time is spent in activities necessary to obtain alcohol, use alcohol, or recover from its effects.  4.) Craving, or a strong desire or urge to use alcohol/drug  5.) Recurrent alcohol/drug use resulting in a failure to fulfill major role obligations at work, school or home.  6.) Continued alcohol use despite having persistent or recurrent social or interpersonal problems caused or exacerbated by the effects of alcohol/drug.  7.) Important social, occupational, or recreational activities are given up or reduced because of alcohol/drug use.  8.) Recurrent alcohol/drug use in situations in which it is physically hazardous.  9.) Alcohol/drug use is continued despite knowledge of having a persistent or recurrent physical or psychological problem that is likely to have been caused or exacerbated by alcohol.  10.) Tolerance, as defined by either of the following: A need for markedly increased amounts of alcohol/drug to achieve intoxication or desired effect.  11.) Withdrawal, as manifested by either of the following: The characteristic withdrawal syndrome for alcohol/drug (refer to Criteria A and B of the criteria set for alcohol/drug withdrawal).    Specify if: In early remission:  After full criteria for alcohol/drug use disorder were previously met, none of the criteria for alcohol/drug use disorder have been met for at least 3 months but for less than 12 months (with the exception that Criterion A4,  Craving or a strong desire or urge to use alcohol/drug  may be met).     In sustained remission:    After full criteria for alcohol use disorder were previously met, non of the criteria for alcohol/drug use disorder have been met at any time during a period of 12 months or longer (with the exception that Criterion A4,  Craving or strong desire or urge to use alcohol/drug  may be met).     Specify if:   This additional specifier is used if the individual is in an environment where access to alcohol is restricted.    Mild: Presence of 2-3 symptoms  Moderate: Presence of 4-5 symptoms  Severe: Presence of 6 or more symptoms    Collateral information:   The patient's medical record at [facility] was reviewed and the information contained in the medical record supported the patient's account of his chemical use history and chemical use consequences.    Recommendations:   1)  Complete a residential based or similar treatment program.  2)  Abstain from all mood-altering chemicals unless prescribed by a licensed provider.   3)  Attend, at minimum, 2 weekly support group meetings, such as 12 step based (AA/NA), SMART Recovery, Health Realizations, and/or Refuge Recovery meetings.     4)  Actively work with a female mentor/sponsor on a weekly basis.   5)  Follow all the recommendations of your treatment/medical providers.    Clinical Substantiation:    Pt has a long history of alcohol use. He reports he has not used drugs in 10 years with the exception of snorting fentanyl while in a black out on New Years Anamaria. Pt would benefit from structure of an inpatient program since he has a tendency to isolate.    Referrals/ Alternatives:  [facility]  [address] [place] [address]  Admissions Phone: [phone]  [facility] waitlist: [phone]  [email or web address]     JACKI consult completed by: ROWAN Juárez.  Phone Number: [phone]  E-mail Address: [email or web address] and  Addiction Services Evaluation Department  [address] [place] [id number]     *Due to regulation of Title 42 of the Code of Federal Regulations (CFR) Part 2: Confidentiality laws apply to this note and the information wherein.  Thus, this note cannot be copy and pasted into any other health care staff's note nor can it be included in general medical records sent to ANY outside agency without the patient's written consent.

## 2024-01-12 VITALS
OXYGEN SATURATION: 100 % | HEART RATE: 67 BPM | RESPIRATION RATE: 18 BRPM | TEMPERATURE: 97.8 F | HEIGHT: 69 IN | BODY MASS INDEX: 33.89 KG/M2 | DIASTOLIC BLOOD PRESSURE: 92 MMHG | SYSTOLIC BLOOD PRESSURE: 132 MMHG | WEIGHT: 228.8 LBS

## 2024-01-12 LAB
ERYTHROCYTE [DISTWIDTH] IN BLOOD BY AUTOMATED COUNT: 14 % (ref 10–15)
HCT VFR BLD AUTO: 49.1 % (ref 40–53)
HGB BLD-MCNC: 16.6 G/DL (ref 13.3–17.7)
HOLD SPECIMEN: NORMAL
MCH RBC QN AUTO: 32.9 PG (ref 26.5–33)
MCHC RBC AUTO-ENTMCNC: 33.8 G/DL (ref 31.5–36.5)
MCV RBC AUTO: 97 FL (ref 78–100)
PLATELET # BLD AUTO: 87 10E3/UL (ref 150–450)
RBC # BLD AUTO: 5.05 10E6/UL (ref 4.4–5.9)
WBC # BLD AUTO: 3.5 10E3/UL (ref 4–11)

## 2024-01-12 PROCEDURE — 250N000013 HC RX MED GY IP 250 OP 250 PS 637: Performed by: HOSPITALIST

## 2024-01-12 PROCEDURE — 99239 HOSP IP/OBS DSCHRG MGMT >30: CPT | Performed by: INTERNAL MEDICINE

## 2024-01-12 PROCEDURE — 250N000013 HC RX MED GY IP 250 OP 250 PS 637: Performed by: INTERNAL MEDICINE

## 2024-01-12 PROCEDURE — 85027 COMPLETE CBC AUTOMATED: CPT | Performed by: INTERNAL MEDICINE

## 2024-01-12 PROCEDURE — 36415 COLL VENOUS BLD VENIPUNCTURE: CPT | Performed by: INTERNAL MEDICINE

## 2024-01-12 RX ORDER — MULTIPLE VITAMINS W/ MINERALS TAB 9MG-400MCG
1 TAB ORAL DAILY
Qty: 30 TABLET | Refills: 0 | Status: SHIPPED | OUTPATIENT
Start: 2024-01-13 | End: 2024-02-26

## 2024-01-12 RX ORDER — AMLODIPINE BESYLATE 5 MG/1
5 TABLET ORAL DAILY
Qty: 14 TABLET | Refills: 0 | Status: SHIPPED | OUTPATIENT
Start: 2024-01-13 | End: 2024-01-18

## 2024-01-12 RX ADMIN — AMLODIPINE BESYLATE 5 MG: 5 TABLET ORAL at 08:48

## 2024-01-12 RX ADMIN — NICOTINE 1 PATCH: 21 PATCH, EXTENDED RELEASE TRANSDERMAL at 08:47

## 2024-01-12 RX ADMIN — CLONIDINE HYDROCHLORIDE 0.1 MG: 0.1 TABLET ORAL at 08:49

## 2024-01-12 RX ADMIN — Medication 1 TABLET: at 08:47

## 2024-01-12 RX ADMIN — SERTRALINE HYDROCHLORIDE 50 MG: 50 TABLET ORAL at 08:49

## 2024-01-12 RX ADMIN — Medication 75 MCG: at 08:47

## 2024-01-12 RX ADMIN — GABAPENTIN 900 MG: 300 CAPSULE ORAL at 08:46

## 2024-01-12 RX ADMIN — FOLIC ACID 1 MG: 1 TABLET ORAL at 08:49

## 2024-01-12 RX ADMIN — THIAMINE HCL TAB 100 MG 100 MG: 100 TAB at 08:48

## 2024-01-12 RX ADMIN — VENLAFAXINE 37.5 MG: 37.5 TABLET ORAL at 08:49

## 2024-01-12 ASSESSMENT — ACTIVITIES OF DAILY LIVING (ADL)
ADLS_ACUITY_SCORE: 35

## 2024-01-12 NOTE — DISCHARGE SUMMARY
"[facility]  Hospitalist Discharge Summary      Date of Admission:  1/9/2024  Date of Discharge:  1/12/2024  Discharging Provider: Dusty Hammer MD  Discharge Service: Hospitalist Service, GOLD TEAM 17    Discharge Diagnoses       # Acute alcohol withdrawal:   # Alcohol use disorder : Moderate to severe.    # High BP         Clinically Significant Risk Factors     # Obesity: Estimated body mass index is 33.79 kg/m  as calculated from the following:    Height as of this encounter: 1.753 m (5' 9\").    Weight as of this encounter: 103.8 kg (228 lb 12.8 oz).       Follow-ups Needed After Discharge   Follow-up Appointments     Adult [facility] Follow-up and recommended labs and tests      Follow up with primary care provider, Physician No Ref-Primary, within 7   days for hospital follow- up.  The following labs/tests are recommended:   CBC, LFT.    Ambulatory BP monitor.   Follow up with alcohol treatment programs.         Appointments on [place] and/or [facility] (with [facility] or [facility]   provider or service). Call [phone] if you haven't heard regarding   these appointments within 7 days of discharge.                Discharge Disposition   Discharged to home  Condition at discharge: Stable    Hospital Course   Jose Alberto Cuellar is a 48 year old male admitted on 1/9/2024. He Has a history of alcohol use disorder, central hypogonadism, hypothyroidism, depression who presents with acute alcohol intoxication and concerns for acute alcohol withdrawal    # Acute alcohol withdrawal:   # Alcohol use disorder : Moderate to severe.      Patient has a long history of alcohol use disorder.  He has been drinking for many years and has been drinking almost 2 L of hard alcohol daily for the last several months.  No history of seizures.  Has never really been in treatment.    -CIWA protocol with Valium- detoxed.   -Gabapentin and clonidine taper  -Oral multivitamin, thiamine, " folic acid daily  - CD consultation: residential CD treatment   -  consultation: provided him with Chemical Dependency resources. LADC met with patient and completed JACKI assessment and referral was made to [facility]. [facility] information has been added to AVS.   - Rec: Alcohol abstinence      # High blood pressure: Patient has a possible diagnosis of hypertension.  Blood pressures are elevated here on admission, though consistent with his withdrawal so difficult to discern what is what.  -Continue clonidine per withdrawl  -Amlodipine 5 mg daily. Ambulatory bp monitor. Pcp fu  -As needed hydralazine  -Monitor    # Leukopenia, thrombocytopenia:   -Likely secondary to bone marrow suppression 2/2 alcohol use.  Follow-up CBC.  stable    # Depression: Patient denies suicidal ideation.  -Continue prior to admission sertraline and Effexor    #Hypothyroidism: Continue prior to admission Synthroid    Consultations This Hospital Stay   CHEMICAL DEPENDENCY IP CONSULT  SOCIAL WORK IP CONSULT  CARE MANAGEMENT / SOCIAL WORK IP CONSULT    Code Status   Full Code    Time Spent on this Encounter   IDusty MD, personally saw the patient today and spent greater than 30 minutes discharging this patient.       Dusty Hammer MD  [facility]  [address] [place] [id number]  Phone: [phone]  Fax: [phone]  ______________________________________________________________________    Physical Exam   Vital Signs: Temp: 97.8  F (36.6  C) Temp src: Oral BP: (!) 132/92 Pulse: 67   Resp: 18 SpO2: 100 % O2 Device: None (Room air)    Weight: 228 lbs 12.8 oz    General Appearance:  Awake, interactive, NAD  Respiratory: Normal work of breathing.  Cardiovascular: RRR  GI: Soft. NT. ND.  Extremities: Distally wwp. No pedal edema  Skin: No acute rash on exposed areas.   Neuro: Grossly non focal.    Others: Stable mood.         Primary Care Physician   Physician No Ref-Primary    Discharge  Orders      Reason for your hospital stay    Alcohol use disorder, withdrawal.   High blood pressure.     Activity    Your activity upon discharge: activity as tolerated     Adult [facility] Follow-up and recommended labs and tests    Follow up with primary care provider, Physician No Ref-Primary, within 7 days for hospital follow- up.  The following labs/tests are recommended: CBC, LFT.    Ambulatory BP monitor.   Follow up with alcohol treatment programs.         Appointments on [place] and/or [facility] (with [facility] or [facility] provider or service). Call [phone] if you haven't heard regarding these appointments within 7 days of discharge.     Diet    Follow this diet upon discharge: Orders Placed This Encounter  Regular Diet Adult  Low salt diet.       Significant Results and Procedures   Most Recent 3 CBC's:  Recent Labs   Lab Test 01/12/24  0704 01/11/24  0647 01/10/24  0734   WBC 3.5* 3.5* 2.2*   HGB 16.6 15.4 15.6   MCV 97 98 96   PLT 87* 85* 86*     Most Recent 3 BMP's:  Recent Labs   Lab Test 01/10/24  0734 01/09/24  1607    133*   POTASSIUM 4.0 4.3   CHLORIDE 99 94*   CO2 27 27   BUN 17.7 13.8   CR 0.82 0.78   ANIONGAP 9 12   ANTWON 9.1 9.7   GLC 97 94     Most Recent 2 LFT's:  Recent Labs   Lab Test 01/09/24  1607   *   *   ALKPHOS 86   BILITOTAL 0.4   , No results found for this or any previous visit.    Discharge Medications   Current Discharge Medication List        START taking these medications    Details   amLODIPine (NORVASC) 5 MG tablet Take 1 tablet (5 mg) by mouth daily  Qty: 14 tablet, Refills: 0    Comments: Monitor BP. Hold for sbp<120. Goal BP<140/90 for now.  Associated Diagnoses: Alcohol dependence with withdrawal with complication (H)      multivitamin w/minerals (THERA-VIT-M) tablet Take 1 tablet by mouth daily  Qty: 30 tablet, Refills: 0    Associated Diagnoses: Alcohol dependence with withdrawal with complication (H)           CONTINUE these medications which have  NOT CHANGED    Details   levothyroxine (SYNTHROID/LEVOTHROID) 75 MCG tablet Take 75 mcg by mouth daily      sertraline (ZOLOFT) 100 MG tablet Take 50 mg by mouth daily      testosterone cypionate (DEPOTESTOSTERONE) 200 MG/ML injection Inject 200 mg into the muscle every 14 days      venlafaxine (EFFEXOR) 37.5 MG tablet TAKE 1 TABLET(37.5 MG) BY MOUTH TWICE DAILY WITH MEALS           Allergies   Allergies   Allergen Reactions    Penicillins Angioedema and Rash     Converted from Drug Class Allergy: Penicillins

## 2024-01-12 NOTE — PROGRESS NOTES
"VS:  BP (!) 132/92 (BP Location: Right arm, Patient Position: Right side, Cuff Size: Adult Regular)   Pulse 67   Temp 97.8  F (36.6  C) (Oral)   Resp 18   Ht 1.753 m (5' 9\")   Wt 103.8 kg (228 lb 12.8 oz)   SpO2 100%   BMI 33.79 kg/m     O2:  Room Air    Output:  Independent to Toilet   Last BM:  01/12/24   Activity:  Independent    Up for meals?  Yes / Chair / [place] 45 degrees   Skin:  Upper Body Tattoos    Pain:  Denies    CMS:  A&OX 4   Dressing:  None    Diet:  Regular    LDA:  Left PIV / SL   Equipment:  Personal Belongings    Plan:  POC   Additional Info:  ETOH      Withdrawal    CIWA Score   3  5    Discharge to Home   "

## 2024-01-12 NOTE — TELEPHONE ENCOUNTER
Date: 1/12/2024    To: CRISPIN RN    Please call this patient at [phone] to complete a medical screening to clarify his medications and medical condition and to complete a COVID-19 screening.      The patient has a history of:  Currently in hospital for ETOH withdrawal. 6 MS    INSIDE: This patient had a substance abuse assessment with Suad Lozano MA, ROWAN , so no paperwork will be sent up to the 6th floor because all of the clinical documentation is in the patient's electronic medical record in [facility].      Thanks,  ROWAN Tineo

## 2024-01-12 NOTE — DISCHARGE INSTRUCTIONS
[facility]  [address], [place] [address]  Admissions Phone: [phone]  [facility] waitlist: [phone]  [email or web address]

## 2024-01-12 NOTE — CONSULTS
Care Management Discharge Note    Discharge Date: 01/12/2024     Discharge Disposition: Home    Discharge Services: [facility]    Discharge DME: None    Discharge Transportation: family or friend will provide    Private pay costs discussed: Not applicable    Does the patient's insurance plan have a 3 day qualifying hospital stay waiver?  No    PAS Confirmation Code: N/A     Patient/family educated on Medicare website which has current facility and service quality ratings: N/A    Education Provided on the Discharge Plan: Yes    Persons Notified of Discharge Plans: Charge Nurse, Bedside Nurse, MD, Patient    Patient/Family in Agreement with the Plan: Yes    Handoff Referral Completed: Yes    Additional Information:    Writer met with patient to discuss discharge plan. Patient in agreement with returning home. Writer provided him with Chemical Dependency resources. LADC met with patient and completed JACKI assessment and referral was made to [facility]. [facility] information has been added to AVS. Patient has family that can provide ride for him to home.     JOSE Chong, MSW  6th Floor Medical Surgical Unit  Phone [phone]

## 2024-01-12 NOTE — PLAN OF CARE
[id number]    Patient is A&O x4. Call light within reach. Able to make needs known effectively. Independent in the room. Voids spontaneously to the bathroom. LBM: 01/11.    RA, Regular diet. PIV on L AC SL, intact and patent. Denies pain, chest pain, SOB and n/v. Numbness and tingling on BLE. PRN melatonin given.     CIWA scoring done per protocol and when awake.   2304- CIWA score of 8, Valium 10mg PO given.     Possible for discharge to home tomorrow. Continue with POC.

## 2024-01-12 NOTE — TELEPHONE ENCOUNTER
"  January 12, 2024    Referral Medical Screening:  Per client self report and chat review    1) Medications?  Out of detox...Gabapentin taper, synthroid, effexor amlodipine, and vitamins   For hospital or any facility \"door to door\"...Nurse to nurse report required and \"too soon to refill\" medication issues must be resolved prior to pt admission.  We have no rounding provider at [facility] to asess medical issues or trouble shoot medication issues.    2) Medical conditions?  High blood pressure central hypogonadism, hypothyroidism,   Respiratory Condition? (Asthma, COPD, RAD, Bronchitis, Emphysema, Cystic fibrosis, SHELIA, etc.)  None    3) Independent with ADL'S?  Yes     4) Assistive devices (ambulatory, orthotics, hearing, c-pap etc.)?  None    5) Fall risk?  None    6) Pain management concerns?  None    7) Dental health concerns?  None    8) Skin integrity concerns (wounds, rash, etc)?  None    9) Infectious disease concerns (MRSA, ESBL, VRE, C-Diff, TB, etc.)  None    10) Mental health concerns /suicidal ideation?  None    11) COVID-19 Symptom Screening Tool:     Do you have any of the following NEW or worsening symptoms NOT attributed to pre-existing conditions?    No,     Fever of 100.0  F (37.8 C) or over  Chills  Cough  Shortness of Breath  Loss of taste or smell  Generalized body aches  Persistent headache  Sore throat (or trouble eating or drinking in young children?)  Nausea, Vomiting, or diarrhea (loose stools)    Did you test positive for COVID-19 in the last 14  days or are you waiting on the test results due to an exposure or symptoms?  No,     Has anyone told you to self-quarantine due to exposure to someone with COVID-19?  No,      Positive screen - LPRN to reach out to [facility] Infection Prevention for advisement/recommendations     Based on above assessment: Client meets medical criteria for admission to [facility]. Pt os discharging home today and wants to admit to LP early next week " if possible     IS THIS PT CONSIDERED COMPLEX? No,       IF patient is APPROVED for Admission to LP, they are informed of the following (below) by LPRN:    1) No drug or alcohol use for a minimum of 24 hours prior to admission to LP.  2) Should you acquire COVID or influenza symptoms, you may not admit to LP.  Call LPRN PRIOR TO ADMISSION for assessment / recommendations at [phone].  3)Structured outdoor activities may involve walking several city blocks. If you think you may have difficulty safely navigating this distance please speak with the LP Nurse.    4) Bring 30 day supply of all medications.  All OTC's must be sealed for use at LP  5) All medication issues must be resolved prior to admission.  We have no rounding provider at [facility] to assess medical issues or trouble shoot medication issues.    [facility]  Nurse Liaison / CD Adult [facility]  O: [phone]  Fax:[phone]  [facility] [id number]  M-F: 7AM to 5:30PM   Sat-Sun: 7AM to 3PM  After hours: [phone]

## 2024-01-12 NOTE — PROGRESS NOTES
"BP (!) 152/86 (BP Location: Right arm)   Pulse 70   Temp 97.8  F (36.6  C) (Oral)   Resp 16   Ht 1.753 m (5' 9\")   Wt 103.8 kg (228 lb 12.8 oz)   SpO2 96%   BMI 33.79 kg/m      Pt AO x4, on RA and sating adequately. Care plan reviewed and questions addressed. Patient progressing. Chem Dep consult completed today. Patient may discharge tomorrow. Now new events this shift. POC is ongoing.  " Scheduled 7/5/23, patient did not schedule labs, she stated she just completed labs for [place] today.

## 2024-01-18 ENCOUNTER — HOSPITAL ENCOUNTER (EMERGENCY)
Facility: CLINIC | Age: 49
Discharge: HOME | End: 2024-01-18
Attending: STUDENT IN AN ORGANIZED HEALTH CARE EDUCATION/TRAINING PROGRAM | Admitting: STUDENT IN AN ORGANIZED HEALTH CARE EDUCATION/TRAINING PROGRAM
Payer: MEDICAID

## 2024-01-18 ENCOUNTER — HOSPITAL ENCOUNTER (OUTPATIENT)
Dept: BEHAVIORAL HEALTH | Facility: CLINIC | Age: 49
Discharge: HOME | End: 2024-01-18
Attending: FAMILY MEDICINE
Payer: MEDICAID

## 2024-01-18 ENCOUNTER — TELEPHONE (OUTPATIENT)
Dept: BEHAVIORAL HEALTH | Facility: CLINIC | Age: 49
End: 2024-01-18
Payer: MEDICAID

## 2024-01-18 VITALS — DIASTOLIC BLOOD PRESSURE: 112 MMHG | HEART RATE: 79 BPM | SYSTOLIC BLOOD PRESSURE: 171 MMHG | TEMPERATURE: 99.1 F

## 2024-01-18 VITALS
SYSTOLIC BLOOD PRESSURE: 175 MMHG | TEMPERATURE: 97.7 F | HEART RATE: 88 BPM | RESPIRATION RATE: 18 BRPM | DIASTOLIC BLOOD PRESSURE: 116 MMHG | OXYGEN SATURATION: 97 %

## 2024-01-18 DIAGNOSIS — F17.200 NICOTINE DEPENDENCE: Primary | ICD-10-CM

## 2024-01-18 DIAGNOSIS — I10 HYPERTENSION, UNSPECIFIED TYPE: ICD-10-CM

## 2024-01-18 DIAGNOSIS — F10.90 ALCOHOL USE DISORDER: ICD-10-CM

## 2024-01-18 DIAGNOSIS — F10.239 ALCOHOL DEPENDENCE WITH WITHDRAWAL WITH COMPLICATION (H): ICD-10-CM

## 2024-01-18 PROCEDURE — 99283 EMERGENCY DEPT VISIT LOW MDM: CPT | Performed by: STUDENT IN AN ORGANIZED HEALTH CARE EDUCATION/TRAINING PROGRAM

## 2024-01-18 PROCEDURE — H2035 A/D TX PROGRAM, PER HOUR: HCPCS

## 2024-01-18 PROCEDURE — 1002N00001 HC LODGING PLUS FACILITY CHARGE ADULT

## 2024-01-18 PROCEDURE — 99284 EMERGENCY DEPT VISIT MOD MDM: CPT | Mod: FS | Performed by: STUDENT IN AN ORGANIZED HEALTH CARE EDUCATION/TRAINING PROGRAM

## 2024-01-18 RX ORDER — LORATADINE 10 MG/1
10 TABLET ORAL DAILY PRN
COMMUNITY
End: 2024-02-14

## 2024-01-18 RX ORDER — AMOXICILLIN 250 MG
2 CAPSULE ORAL DAILY PRN
COMMUNITY
End: 2024-02-14

## 2024-01-18 RX ORDER — TRIAMCINOLONE ACETONIDE 5 MG/G
CREAM TOPICAL
COMMUNITY
Start: 2023-07-06 | End: 2024-02-26

## 2024-01-18 RX ORDER — NICOTINE 21 MG/24HR
1 PATCH, TRANSDERMAL 24 HOURS TRANSDERMAL EVERY 24 HOURS
Qty: 30 PATCH | Refills: 0 | Status: SHIPPED | OUTPATIENT
Start: 2024-01-18 | End: 2024-02-26

## 2024-01-18 RX ORDER — GUAIFENESIN/DEXTROMETHORPHAN 100-10MG/5
10 SYRUP ORAL EVERY 4 HOURS PRN
COMMUNITY
End: 2024-02-14

## 2024-01-18 RX ORDER — IBUPROFEN 200 MG
400 TABLET ORAL EVERY 6 HOURS PRN
COMMUNITY
End: 2024-02-14

## 2024-01-18 RX ORDER — AMLODIPINE BESYLATE 10 MG/1
10 TABLET ORAL DAILY
Qty: 30 TABLET | Refills: 0 | Status: SHIPPED | OUTPATIENT
Start: 2024-01-18 | End: 2024-02-26

## 2024-01-18 RX ORDER — ACETAMINOPHEN 325 MG/1
325-650 TABLET ORAL EVERY 4 HOURS PRN
COMMUNITY
End: 2024-02-14

## 2024-01-18 RX ORDER — MAGNESIUM HYDROXIDE/ALUMINUM HYDROXICE/SIMETHICONE 120; 1200; 1200 MG/30ML; MG/30ML; MG/30ML
30 SUSPENSION ORAL EVERY 6 HOURS PRN
COMMUNITY
End: 2024-02-14

## 2024-01-18 RX ORDER — LANOLIN ALCOHOL/MO/W.PET/CERES
3 CREAM (GRAM) TOPICAL
COMMUNITY
End: 2024-02-14

## 2024-01-18 ASSESSMENT — ANXIETY QUESTIONNAIRES
2. NOT BEING ABLE TO STOP OR CONTROL WORRYING: NEARLY EVERY DAY
IF YOU CHECKED OFF ANY PROBLEMS ON THIS QUESTIONNAIRE, HOW DIFFICULT HAVE THESE PROBLEMS MADE IT FOR YOU TO DO YOUR WORK, TAKE CARE OF THINGS AT HOME, OR GET ALONG WITH OTHER PEOPLE: EXTREMELY DIFFICULT
4. TROUBLE RELAXING: MORE THAN HALF THE DAYS
GAD7 TOTAL SCORE: 15
3. WORRYING TOO MUCH ABOUT DIFFERENT THINGS: NEARLY EVERY DAY
7. FEELING AFRAID AS IF SOMETHING AWFUL MIGHT HAPPEN: MORE THAN HALF THE DAYS
1. FEELING NERVOUS, ANXIOUS, OR ON EDGE: MORE THAN HALF THE DAYS
6. BECOMING EASILY ANNOYED OR IRRITABLE: SEVERAL DAYS
5. BEING SO RESTLESS THAT IT IS HARD TO SIT STILL: MORE THAN HALF THE DAYS
GAD7 TOTAL SCORE: 15

## 2024-01-18 ASSESSMENT — COLUMBIA-SUICIDE SEVERITY RATING SCALE - C-SSRS
6. HAVE YOU EVER DONE ANYTHING, STARTED TO DO ANYTHING, OR PREPARED TO DO ANYTHING TO END YOUR LIFE?: NO
3. HAVE YOU BEEN THINKING ABOUT HOW YOU MIGHT KILL YOURSELF?: NO
2. HAVE YOU ACTUALLY HAD ANY THOUGHTS OF KILLING YOURSELF IN THE PAST MONTH?: NO
1. IN THE PAST MONTH, HAVE YOU WISHED YOU WERE DEAD OR WISHED YOU COULD GO TO SLEEP AND NOT WAKE UP?: YES
4. HAVE YOU HAD THESE THOUGHTS AND HAD SOME INTENTION OF ACTING ON THEM?: NO
1. IN THE PAST MONTH, HAVE YOU WISHED YOU WERE DEAD OR WISHED YOU COULD GO TO SLEEP AND NOT WAKE UP?: YES
2. HAVE YOU ACTUALLY HAD ANY THOUGHTS OF KILLING YOURSELF LIFETIME?: YES
5. HAVE YOU STARTED TO WORK OUT OR WORKED OUT THE DETAILS OF HOW TO KILL YOURSELF? DO YOU INTEND TO CARRY OUT THIS PLAN?: NO

## 2024-01-18 ASSESSMENT — ENCOUNTER SYMPTOMS: HYPERTENSION: 1

## 2024-01-18 ASSESSMENT — PATIENT HEALTH QUESTIONNAIRE - PHQ9: SUM OF ALL RESPONSES TO PHQ QUESTIONS 1-9: 18

## 2024-01-18 NOTE — PROGRESS NOTES
[facility] Nursing Health Assessment      Vital signs:     There were no vitals taken for this visit.      Direct admission    Counselor: Group B  Drug of Choice: ETOH  Last use: 24 hours ago-Pt is being brought to ED for evaluation of ETOH withdrawal and need for medical detox prior to admit to LP.   Home clinic/MD: Dr Perez [facility]  Psychiatrist/therapist: none  Pt will be getting set up for addiction med    Medical history/current conditions:  hypertension    H&P Screen:  H&P within the last 90 days: Yes.  Date: 1/18/24 Location: ED [facility]       Mental [facility] diagnosis: depression/anxiety   Medication compliant?: yes  Recent sucidal thoughts?   Have you ever wished you were dead or that you could go to sleep and not wake up?  Past Month:  Yes      When you have the thoughts how long do they last?  Fleeting - few seconds or minutes    Pt does not currently endorse SI, plan or intent, reports that his recent SI was related to heavy ETOH Prior to going into the hospital earlier this month    Current thought of self-harm? No    Plan? Na         Pain assessment:   Pt. Experiencing pain at this time?  No      LP Falls assessment    Has patient had a fall(s) in the last 6 months?  No  If yes, what were the circumstances surrounding the fall(s)? na  Does patient have gait dysfunctions? (limping, dragging of toes, shuffling feet, unsteadiness, difficulty standing /walking)  No  Does patient appear to have deconditioning/muscle loss/malnutrition/fatigue? (due to inactivity, bedrest (long hospitalization), medical condition(s) No  Does patient experience confusion, dizziness or vertigo? No  Is patient visually impaired? No   Does patient have any adaptive equipment (hearing aids, wheelchair, walker, prosthesis, crutches, cane, etc..) No  Is patient taking 2 or more of the following medications?  anticonvulsants, anti-hypertensives, diuretics, laxatives, sedatives, and  psychotropics (single-select) No  Is patient taking medication (s) that would cause urinary or bowel urgency (ex: lactulose/Furosemide)? No  Does patient have a medical condition (ex: Diabetes, Liver Disease, Respiratory Diseases, Chronic Pain, Heart Disease, Neuropathy, Seizure like Disorder, etc...) that could affect balance/gait or risk for falls? No    If yes to any of the above educate patient on fall prevention while at [facility].           Floors clutter/obstacle free           Proper footwear/[place] shoes          Adjust walking speed accordingly          Recommend caution going on longer walks. Try shorter walks and see how you do first.  Use elevators when appropriate.          Notify staff if you are experiencing fatigue, weakness, drowsiness/ dizziness or have had a fall.           Use adaptive equipment as recommended          Wheelchairs must be managed and propelled independently without staff assistance           Expectation of complete independence with all cares and compliance.  Report to staff if having difficulty     LP patient who poses a potential falls risk will be advised of the following:  Please follow the recommendations as listed above or it may affect your treatment plan.  Your treatment team would have to evaluate if this level of care is appropriate for you.    Patient acknowledges and verbalizes understanding of the above criteria? No  Support staff / counselors notified of pt Fall Screen and plan of prevention. LPRN to re-assess as appropriate. White board and SBAR updated No  ____________________________________________________________________________________________________________________________    LP Community Medical Screen for COVID-19    Do you have any of the following NEW or worsening symptoms NOT attributed to pre-existing conditions?    No    Fever of 100.0  F (37.8 C) or over  Chills  Cough  Shortness of Breath  Loss of taste or smell  Generalized body  aches  Persistent headache  Sore throat (or trouble eating or drinking in young children?)  Nausea, Vomiting, or diarrhea (loose stools)    Did you test positive for COVID-19 in the last 10 days or are you waiting on the test results due to an exposure or symptoms?  No    Has anyone told you to self-quarantine due to exposure to someone with COVID-19?  No    If pt responds   YES to any of the symptoms or  Positive COVID-19 result in the last 10 days with or without symptoms or YES to symptoms with pending results ptwill need to leave unit immediately and can return in 11 days from discharge date.    ______________________________________________________________________________________________________________________    COVID-19 Test completed by LPRN ? No    COVID-19 - Pt informed of the following while at LP:    1) If pt has any of the symptoms below, notify staff immediately.    Fever   Cough   Shortness of breath or difficulty breathing   Chills   Repeated shaking with chills  Muscle pain     RN Assessment of Infectious Disease concerns:    Infectious disease concerns None  RN Assessment of Patient's Ability to Safely Manage and Self-Administer Respiratory Treatments    Has experience in the management of Respiratory (If NA, indicate and move to Integrative Therapies):  NA  Patient verbalized and demonstrated understanding of how to use essential oil inhaler correctly and will notify LP RN with any concerns or side effects. Patient agrees not to share their essential oil inhaler with other clients.  Continue to support the patient in safely utilizing integrative therapies as able to manage symptoms during treatment.     Patient tobacco use: vaped and smoked cigarettes     Are you interested in quitting? yes    NRT (Nicotine Replacement therapy) ordered? yes   Pt is aware of the dangers of tobacco cessation and in contemplation.    Pt given written education.    Nutritional Assessment:    Have you ever purged,  binged or restricted yourself as a way to control your weight?   No     Are you on a special diet?   No     Do you have any concerns regarding your nutritional status?   No     Have you had any appetite changes in the last 3 months?   No   Have you had weight loss or weight gain of more than 10 lbs in the last 3 months?   If patient gained or lost more than 10 lbs, then refer to program RN / attending Physician for assessment.   Yes, explain: 30 LBS in 3 months during a period of heavy drinking    Was the patient informed of BMI?    Above,  General nutrition education   Yes   Have you engaged in any risk-taking behavior that would put you at risk for exposure to blood-borne or sexually transmitted diseases?   No   Do you have any dental problems?   No       Review with pt's for opioid use disorder: (Please delete below if not applicable)    Pt reporting last use of opioid on date 10 years ago, but one situation on 1/1/24 overdosed on fentanyl accidentally    Pt understands LP drug toxicology screening process yes    LPRN reviewed with pt the following information:  Yes  Pt informed if leaving AMA, they will be directed to take medications with them.  Should pt's choose to leave medications at LP, ALL medications will be packaged and delivered to inpatient pharmacy for temporary storage.  Inpt pharmacy will follow protocol to reach out to pt.  If pharmacy unable to reach pt and/or pt does not retrieve medications, they will be destroyed per inpt pharmacy protocol.   In regards to 'medical concerns/medication refill expectations' while at LP:  Pt understands LP has no rounding/managing provider to assess medical issues or to refill medications.  Pt's instructed to make virtual/phone appt/s with community provider/s and notify LPRN of date and time  Pt's understand they may not leave LP to attend any medical appt's.   Pt's understand they are responsible for having a plan to refill medications and to allow time to  troubleshoot.      Pt verbalizes understanding of the above criteria yes    [facility]  Nurse Liaison / CD Adult [facility]  O: [phone]  Fax:[phone]  [facility] [id number]  M-F: 7AM to 5:30PM   Sat-Sun: 7AM to 3PM  After hours: [phone]   Nursing Assessment Summary:  As above     On-going nursing intervention required?   No    Acute care visit recommended: no

## 2024-01-18 NOTE — ED TRIAGE NOTES
hx of HTN and not med complaint [facility] here d/t elevated BP (170 systolic). Pt there for ETOH and states he hasn't had a drink in 25.5 hours from now. States he feels ago denies chest pain HA dyspnea leg swelling no change in vision.

## 2024-01-18 NOTE — PROGRESS NOTES
Progress Note    This patient had a IP Substance Use Disorder assessment on 01/11/2024 completed by Suad Lozano MA, ROWAN .  This patient was seen for a face to face update of the IP Substance Use Disorder assessment on 1/18/2024 by ROWAN Tineo.  INSIDE: The patient's IP Substance Use Disorder assessment completed on 01/11/2024 is in the patient's electronic medical record in Epic in the Chart Review section under the Notes/Trans Tab.    Alcohol/Drug use since the last CD evaluation (include date of last use):     Pt reported his last use of alcohol was on 1/17/24 at 12 pm.      Please note any other clinical changes since the last CD evaluation (such as medication changes, additional legal charges, detoxification admissions, overdoses, etc.)     No significant changes since the last CD evaluation       ASAM Dimensions Original scores Current Scores   I.) Intoxication and Withdrawal: 0 0   II.) Biomedical:  0 0   III.) Emotional and Behavioral:  2 2   IV.) Readiness to Change:  2 2   V.) Relapse Potential: 4 4   VI.) Recovery Environmental: 4 4     Please list clinical justifications for the above ASAM score changes since the original comprehensive assessment:     None of the ASAM scores on the six dimensions had changed since the IP Substance Use Disorder assessment was completed on 01/11/2024.       Current ROSE MARY: Current UA:     0.000     Positive for Benzodiazepines and negative for all other screened drugs.       PHQ-9, CINTHIA-7   PHQ-9 on 1/18/2024 CINTHIA-7 on 1/18/2024   The patient's PHQ-9 score was 18 out of 27, indicating moderately severe depression.   The patient's CINTHIA-7 score was 15 out of 21, indicating severe anxiety.       [place]-Suicide Severity Rating Scale Reassessment   Have you ever wished you were dead or that you could go to sleep and not wake up?  Past Month:  Yes     Have you actually had any thoughts of killing yourself?  Past Month:  No     Have you been thinking about how you  might do this?     Past Month:  No   Lifetime:  Yes, Describe: multiple scenarios, several years ago   Have you had these thoughts and had some intention of acting on them?     Past Month:  No   Lifetime:  Yes, Describe: years ago   Have you started to work out the details of how to kill yourself?   Past Month:  No   Lifetime:  Yes, Describe: under the influence had thoughts of suicide and how to do it.    Do you intend to carry out this plan?   No     When you have the thoughts how long do they last?  Fleeting - few seconds or minutes     Are there things - anyone or anything (i.e. family, Christianity, pain of death) that stopped you from wanting to die or acting on thoughts of suicide?  Does not apply       2008  [employer]  Used with permission by Sinai Larson, PhD.       Guide to C-SSRS Risk Ratings   NO IDEATION:  with no active thoughts IDEATION: with a wish to die. IDEATION: with active thoughts. Risk Ratings   If Yes No No 0 - Very Low Risk   If NA Yes No 1 - Low Risk   If NA Yes Yes 2 - Low/moderate risk   IDEATION: associated thoughts of methods without intent or plan INTENT: Intent to follow through on suicide PLAN: Plan to follow through on suicide Risk Ratings cont...   If Yes No No 3 - Moderate Risk   If Yes Yes No 4 - High Risk   If Yes Yes Yes 5 - High Risk   The patient's ADDITIONAL RISK FACTORS and lack of PROTECTIVE FACTORS may increase their overall suicide risk ratings.     Additional Risk Factors:   Tendency to be socially isolated and/or cut off from the support of others    A recent loss that was significant to the patient, i.e. loss of job, loss of home, divorce, break-up, etc.   Protective Factors:   Having people in his/her life that would prevent the patient from considering a suicide attempt (i.e. young children, spouse, parents, etc.)    An absence of mental health issues or stable and well treated mental health issues    An absence of chronic health  "problems or stable and well treated chronic health issues    A positive relationship with his/her clinical medical and/or mental health providers    Having easy access to supportive family members    Having a good community support network    Having restricted access to highly lethal means of suicide     Risk Status   1. - Low Risk: Evaluation Counselors:  Document in Epic / SBAR to counselor \"Low Risk\".      Treatment Counselors:  Reassess upon admission as applicable, assess weekly in progress notes under Dimension 3 and summarize in Discharge / Treatment summary under Dimension 3.     Additional information to support suicide risk rating: SI years ago when using drugs and alcohol.       "
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
## 2024-01-18 NOTE — TELEPHONE ENCOUNTER
Pt got out of hospital (see epic) on 1/12. Since 1/13 until 24 hours ago yesterday pt was drinking hard alcohol all day and throughout the night. Pt would take breaks every couple of hours and then drink more. Pt presents for admit today and blew 0.00 on his breathalyzer. He reports feeling anxious, but have no other s/s of ETOH withdrawal. Pt's b/p was 171/112, HR 79. Temp was 99.1  No history of complicated withdrawal or withdrawal seizures   He brought with him venlafaxine, levothyroxine. Amlodipine-has not been taking amlodipine regularly since leaving the hospital    Please advise if he is appropriate to stay at LP or if he should go to ED for evaluation for detox.

## 2024-01-18 NOTE — ED PROVIDER NOTES
ED Provider Note  [facility]      History     Chief Complaint   Patient presents with    Hypertension    Drug / Alcohol Assessment       Hypertension  Drug / Alcohol Assessment      47yo M pmhx alcohol abuse and ?HTN presents from [facility] for HTN and r/o withdrawal. Pt reports that he recently was admitted here for detox (discharged 1/12/24).  Upon discharge, he states he started drinking again, drinking for 5 days up until yesterday; last drink now ~27hrs ago.  Patient does not feel that he is withdrawing at present.  Notably regarding blood pressure, patient was hypertensive during his recent detox admission, and was started on 5 mg amlodipine which she states he took earlier today.  He denies headache, acute vision changes, chest pain.    Past Medical History  History reviewed. No pertinent past medical history.  History reviewed. No pertinent surgical history.  levothyroxine (SYNTHROID/LEVOTHROID) 75 MCG tablet  acetaminophen (TYLENOL) 325 MG tablet  alum & mag hydroxide-simethicone (MAALOX) 200-200-20 MG/5ML SUSP suspension  amLODIPine (NORVASC) 5 MG tablet  benzocaine-menthol (CEPACOL) 15-3.6 MG lozenge  guaiFENesin-dextromethorphan (ROBITUSSIN DM) 100-10 MG/5ML syrup  ibuprofen (ADVIL/MOTRIN) 200 MG tablet  loratadine (CLARITIN) 10 MG tablet  melatonin 3 MG tablet  multivitamin w/minerals (THERA-VIT-M) tablet  nicotine (NICODERM CQ) 21 MG/24HR 24 hr patch  senna-docusate (SENOKOT-S/PERICOLACE) 8.6-50 MG tablet  testosterone cypionate (DEPOTESTOSTERONE) 200 MG/ML injection  triamcinolone (ARISTOCORT HP) 0.5 % external cream  venlafaxine (EFFEXOR) 37.5 MG tablet      Allergies   Allergen Reactions    Penicillins Angioedema and Rash     Converted from Drug Class Allergy: Penicillins     Family History  No family history on file.  Social History   Social History     Tobacco Use    Smoking status: Every Day     Types: Cigarettes    Smokeless tobacco: Never   Substance Use Topics     Alcohol use: Yes     Comment: Vodka    Drug use: Not Currently         A medically appropriate review of systems was performed with pertinent positives and negatives noted in the HPI, and all other systems negative.    Physical Exam   BP: (!) 175/116  Pulse: 88  Temp: 97.7  F (36.5  C)  Resp: 18  SpO2: 97 %  Physical Exam  Constitutional:       General: He is not in acute distress.     Appearance: Normal appearance. He is not toxic-appearing.   HENT:      Head: Atraumatic.   Cardiovascular:      Rate and Rhythm: Normal rate.   Pulmonary:      Effort: Pulmonary effort is normal.   Musculoskeletal:         General: No deformity.      Cervical back: Neck supple.   Skin:     General: Skin is warm.   Neurological:      Mental Status: He is alert.      Comments: No hand tremors or tongue fasciculations           ED Course, Procedures, & Data      Procedures                      No results found for any visits on 01/18/24.  Medications - No data to display  Labs Ordered and Resulted from Time of ED Arrival to Time of ED Departure - No data to display  No orders to display          Critical care was not performed.     Medical Decision Making  The patient's presentation was of high complexity (a chronic illness severe exacerbation, progression, or side effect of treatment).    The patient's evaluation involved:  review of external note(s) from 3+ sources (ED and recent admission)  review of 3+ test result(s) ordered prior to this encounter (recent admission labs)  strong consideration of a test (CBC, CMP, Trop) that was ultimately deferred    The patient's management necessitated high risk (a decision regarding hospitalization).    Assessment & Plan    47yo M pmhx alcohol abuse and ?HTN presents from [facility] for HTN and r/o withdrawal.  Was admitted here for detox recently, discharged 1/12/24.  Upon discharge, he states he started drinking again, drinking for 5 days up until yesterday; last drink now ~27hrs ago.   Patient does not feel that he is withdrawing at present.  Notably regarding blood pressure, patient was hypertensive during his recent detox admission, and was started on 5 mg amlodipine which she states he took earlier today.  He denies headache, acute vision changes, chest pain.    In the ED at present, patient is nontachycardic, but he is hypertensive (175/116).  He does not appear to have withdrawal symptoms with notably absent tongue fasciculations or hand tremors.  I suspect the patient has some degree of underlying essential hypertension, as he notes that his PCP had been asking him to trend his blood pressure, but he never followed up.  To this end, his 5 mg dose of amlodipine is likely too small to adequately control his pressures.  Low concern for hypertensive emergency/endorgan damage as patient had normal renal function last week when admitted, and is currently asymptomatic.  Therefore, we will not pursue further workup today.  Patient was advised to increase his amlodipine dose to 10 mg daily, track his blood pressures at home, and follow-up with his PCP.  Patient medically cleared to return to [facility].  ER return precautions given.    I have reviewed the nursing notes. I have reviewed the findings, diagnosis, plan and need for follow up with the patient.    New Prescriptions    NICOTINE (NICODERM CQ) 21 MG/24HR 24 HR PATCH    Place 1 patch onto the skin every 24 hours       Final diagnoses:   Hypertension, unspecified type   Alcohol use disorder         Andrea Bustillos PA-C  [facility] EMERGENCY DEPARTMENT  1/18/2024     Andrea Bustillos PA-C  01/18/24 1632      --    ED Attending Physician Attestation    I Tessa Galloway MD, cared for this patient with the Advanced Practice Provider (ROWENA). I have performed a history and physical examination of the patient independent of the ROWENA. I reviewed the ROWENA's documentation above and agree with the documented findings and plan of care. I  personally provided a substantive portion of the care for this patient, including the complete Medical Decision Making. Please see the ROWENA's documentation for full details.      Medical Decision Making  The patient's presentation was of high complexity (a chronic illness severe exacerbation, progression, or side effect of treatment).    The patient's evaluation involved:  review of external note(s) from 3+ sources (see separate area of note for details)  review of 3+ test result(s) ordered prior to this encounter (see separate area of note for details)    The patient's management necessitated moderate risk (prescription drug management including medications given in the ED).          Tessa Galloway MD  Emergency Medicine        Tessa Galloway MD  01/19/24 0002

## 2024-01-18 NOTE — PROGRESS NOTES
Initial Services Plan    Before your first treatment group, please do the following    Immediate health & safety concerns: Look for sober housing and a supportive social network.  Look for a support network (such as AA, NA, DBT group, a Alevism group, etc.)    Suggestions for client during the time between intake & completion of treatment plan:  Tour your treatment center (unit or outpatient clinic).  Introduce yourself to the treatment group.  Spend time getting to know your peers.  Complete the problem list for your treatment plan.  Start drug and alcohol use history.  Review your patient or client handbook.    Client issues to be addressed in the first treatment sessions:  Identify motivations(s) for coming to treatment, i.e. legal, family, job, self  Identify outside concerns that may interfere with treatment, i.e. bills not getting paid, homesick for children    James Shoemaker, [facility]  1/18/2024

## 2024-01-18 NOTE — TELEPHONE ENCOUNTER
Pt escorted to ED, ED charge updated with report on pt. Pt can return to [facility] today if he is medically cleared.

## 2024-01-19 ENCOUNTER — HOSPITAL ENCOUNTER (OUTPATIENT)
Dept: BEHAVIORAL HEALTH | Facility: CLINIC | Age: 49
Discharge: HOME | End: 2024-01-19
Attending: FAMILY MEDICINE
Payer: MEDICAID

## 2024-01-19 ENCOUNTER — TELEPHONE (OUTPATIENT)
Dept: BEHAVIORAL HEALTH | Facility: CLINIC | Age: 49
End: 2024-01-19
Payer: MEDICAID

## 2024-01-19 DIAGNOSIS — G47.00 INSOMNIA, UNSPECIFIED TYPE: Primary | ICD-10-CM

## 2024-01-19 PROCEDURE — 1002N00001 HC LODGING PLUS FACILITY CHARGE ADULT

## 2024-01-19 PROCEDURE — H2035 A/D TX PROGRAM, PER HOUR: HCPCS | Mod: HQ

## 2024-01-19 NOTE — TELEPHONE ENCOUNTER
Pt requesting a small a supply of trazodone to help him with sleep at the beginning of treatment? He is seeing Cecile Adames next Friday. Please advise     Just following up on this message.

## 2024-01-19 NOTE — GROUP NOTE
"Group Therapy Documentation    PATIENT'S NAME: Jose Alberto Cuellar  MRN:   [id number]  :   1975  ACCT. NUMBER: [id number]  DATE OF SERVICE: 24  START TIME:  9:00 AM  END TIME: 11:00 AM  FACILITATOR(S): Randi Lord LADC  TOPIC: BEH Group Therapy  Number of patients attending the group:  10  Group Length:  2 Hours    Group Therapy Type: Emotion processing    Summary of Group / Topics Discussed:    Disease of addiction and Emotions/expression      Group Attendance:  Attended group session    Patient's response to the group topic/interactions:  cooperative with task    Patient appeared to be Actively participating, Attentive, and Engaged.        Client specific details: Jose Alberto was an active participant in morning group therapy session and peer graduation ceremony. He also offered feedback on his peers' \"Self-Esteem\" and \"Relapse Prevention Planning\" assignments.    "

## 2024-01-19 NOTE — PROGRESS NOTES
"Comprehensive Assessment Summary     Based on client interview, review of previous assessments and   comprehensive assessment interview the following diagnosis and recommendations are:     Patient: Jose Alberto Cuellar  MRN; [id number]   : 1975  Age: 48 year old Sex: male       Client meets criteria for:  Alcohol Use Disorder, Severe F10.20-(303.90)    Dimension One: Acute Intoxication/Withdrawal Potential     Ratin  (Consider the client's ability to cope with withdrawal symptoms and current state of intoxication)       Patient reports his last use date for alcohol as 24. Patient reports withdrawal symptomology which includes racing thoughts and night sweats.    Dimension Two: Biomedical Condition and Complications    Ratin  (Consider the degree to which any physical disorder would interfere with treatment for substance abuse, and the client's ability to tolerate any related discomfort; determine the impact of continued chemical use on the unborn child if the client is pregnant)       Patient reports high blood pressure and hypothyroidism, but no issues or concerns that would interfere with his ability to complete [facility] program.    Dimension Three: Emotional/Behavioral/Cognitive Conditions & Complications  Ratin  (Determine the degree to which any condition or complications are likely to interfere with treatment for substance abuse or with functioning in significant life areas and the likelihood of risk of harm to self or others)       Patient reports a formal diagnosis of depression and anxiety. Patient reports taking medication for the aforementioned and that he has been med compliant. Patient states,  \"I became deeply depressed when I stopped working, I became really bored and that lead me back to using.\" Patient also reports, \"I get really anxious when I think about the future and how I've screwed up my health, relationships and finances.\" \"I let down everyone who depended on " "me.\" Patient reports guilt and shame and states, \"I put my parents through hell.\" \"Now I beat my self up for what I've done to others.\" Patient reports that he would like to see staff psychotherapist to address his mental and emotional concerns. Patient reports no suicidal ideation at this time.    Dimension Four: Treatment Acceptance/Resistance     Ratin    (Consider the amount of support and encouragement necessary to keep the client involved in treatment)       Patient rates his motivation for change as a 9, 1-(low)-10-(high). Patient states,\"I'm motivated, but I don't have much confidence in myself.\" \"I did check my self into treatment on my own so I guess and should feel good about that.\" \"I need to stop questioning myself about getting sober and sabotaging my chances for a good life.\" Patient cites the verbal motivation, but has not demonstrated the behavior necessary for change. Patient appears to be in the contemplation stage of change.    Dimension Five: Continued Use/Relaspe Prevention     Ratin  (Consider the degree to which the client's recognizes relapse issues and has the skills to prevent relapse of either substance use or mental health problems)       Patient reports no cravings. Patient reports having been through one previous inpatient treatment program of which he completed and several outpatient programs of which he did not. Patient reports that his longest period of sobriety was three years during which time he had no sponsor and attended no meetings. 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 Patient reports that his strengths are being a nice vidal, motivated and the ability to get along with others.  Patient further states, \"I need structure and people that will hold me accountable.\" " Patient lacks awareness and insight regarding his triggers, cues and early warning signs for relapse. Patient is a strong candidate for relapse without structure and accountability.    Dimension Six: Recovery Environment     Ratin    (Consider the degree to which key areas of the client's life are supportive of or antagonistic to treatment participation and recovery)       Patient reports that he is currently not working, but is employed and will be allowed to return to work when he has completed treatment. Patient has no legal issues. Patient reports that he is open to aftercare suggestions, but would like to return to his apartment when program is completed.    I have reviewed the information on the assessment, psychosocial and medical history and checklist:        it is current

## 2024-01-19 NOTE — PROGRESS NOTES
Name: Jose Alberto Cuellar  Date: 1/18/2024  Medical Record: [id number]    Envelope Number: [id number]  List of Contents (List each item separately in new row):   One Cell Phone (Cracked), One  & Cord.   Admission:  I am responsible for any personal items that are not sent to the safe or pharmacy.  [facility] is not responsible for loss, theft or damage of any property in my possession.    Patient Signature:  ___________________________________________       Date/Time:__________________________    Staff Signature: __________________________________       Date/Time:__________________________    [facility] Staff person, if patient is unable/unwilling to sign:      __________________________________________________________       Date/Time: __________________________    **All medications are packaged by [facility] staff and securely stored [facility]. Medications left by patients at discharge will be packaged by [facility] staff and transported by [facility] staff to inpatient pharmacy for storage.**    Discharge:  [facility] has returned all of my personal belongings:    Patient Signature: ________________________________________     Date/Time: ____________________________________    Staff Signature: ______________________________________     Date/Time:_____________________________________

## 2024-01-20 ENCOUNTER — HOSPITAL ENCOUNTER (OUTPATIENT)
Dept: BEHAVIORAL HEALTH | Facility: CLINIC | Age: 49
Discharge: HOME | End: 2024-01-20
Attending: FAMILY MEDICINE
Payer: MEDICAID

## 2024-01-20 PROCEDURE — H2035 A/D TX PROGRAM, PER HOUR: HCPCS | Mod: HQ

## 2024-01-20 PROCEDURE — 1002N00001 HC LODGING PLUS FACILITY CHARGE ADULT

## 2024-01-20 NOTE — GROUP NOTE
Group Therapy Documentation    PATIENT'S NAME: Jose Alberto Cuellar  MRN:   [id number]  :   1975  ACCT. NUMBER: [id number]  DATE OF SERVICE: 24  START TIME: 12:30 PM  END TIME:  2:30 PM  FACILITATOR(S): Randi Lord LADC; Johann Townsend LADC  TOPIC: BEH Group Therapy  Number of patients attending the group:  29  Group Length:  2 Hours    Group Therapy Type: Emotion processing    Summary of Group / Topics Discussed:    Relationship/socialization      Group Attendance:  Attended group session    Patient's response to the group topic/interactions:  cooperative with task    Patient appeared to be Actively participating, Attentive, and Engaged.        Client specific details: Jose Alberto was an active participant in weekend Relationships workshop on building a sober support network.

## 2024-01-20 NOTE — PROGRESS NOTES
Acknowledgement of Current Treatment Plan - Initial Treatment Plan     INITIAL TREATMENT PLAN:     1. I have participated in creating my treatment plan with my therapist / counselor on 1/20/2024.     I agree with the plan as it is written in the electronic health record.    Name Signature/Date   Jose Alberto Cuellar    Name of Therapist / Counselor Signature/Date   ROWAN Pereyar, [facility]      2. I have completed and reviewed my Safety Plan with my counselor and signed this on 1/20/2024. I have been given the hard copy of this plan.    Patient signature/date:      ________________________________________________________________    3. Last Use Date: 1/8/24    Patient signature/date:     ________________________________________________________________

## 2024-01-20 NOTE — GROUP NOTE
Group Therapy Documentation    PATIENT'S NAME: Jose Alberto Cuellar  MRN:   [id number]  :   1975  ACCT. NUMBER: [id number]  DATE OF SERVICE: 24  START TIME:  9:00 AM  END TIME: 11:00 AM  FACILITATOR(S): Johann Townsend LADC  TOPIC: BEH Group Therapy  Number of patients attending the group:  29  Group Length:  2 Hours    Group Therapy Type: Recovery strategies    Summary of Group / Topics Discussed:    Recovery Principles, Trauma informed care, Disease of addiction, Emotions/expression, and Leisure explorations/use of leisure time      Group Attendance:  Attended group session    Patient's response to the group topic/interactions:  cooperative with task    Patient appeared to be Attentive and Engaged.        Client specific details:  The patient participated in the morning lecture on recovery resources .

## 2024-01-21 ENCOUNTER — HOSPITAL ENCOUNTER (OUTPATIENT)
Dept: BEHAVIORAL HEALTH | Facility: CLINIC | Age: 49
Discharge: HOME | End: 2024-01-21
Attending: FAMILY MEDICINE
Payer: MEDICAID

## 2024-01-21 PROCEDURE — 1002N00001 HC LODGING PLUS FACILITY CHARGE ADULT

## 2024-01-21 PROCEDURE — H2035 A/D TX PROGRAM, PER HOUR: HCPCS | Mod: HQ

## 2024-01-21 NOTE — GROUP NOTE
Psychoeducation Group Documentation    PATIENT'S NAME: Jose Alberto Cuellar  MRN:   [id number]  :   1975  ACCT. NUMBER: [id number]  DATE OF SERVICE: 24  START TIME:  8:45 AM  END TIME: 10:45 AM  FACILITATOR(S): Johann Townsend LADC; Chapito Dee RN  TOPIC: BEH Pyschoeducation  Number of patients attending the group:  9  Group Length:  1 Hours    Skills Group Therapy Type: Medication education    Summary of Group / Topics Discussed:    Medication management skills        Group Attendance:  Attended group session    Patient's response to the group topic/interactions:  cooperative with task    Patient appeared to be Attentive and Engaged.         Client specific details:  The patient participated in the morning nursing lecture on HIV/AIDS.

## 2024-01-21 NOTE — GROUP NOTE
Psychoeducation Group Documentation    PATIENT'S NAME: Jose Alberto Cuellar  MRN:   [id number]  :   1975  ACCT. NUMBER: [id number]  DATE OF SERVICE: 24  START TIME: 12:30 PM  END TIME:  1:30 PM  FACILITATOR(S): Camilla Escalera LADC; Johann Townsend LADC  TOPIC: BEH Pyschoeducation  Number of patients attending the group: 9  Group Length:  1 Hours    Skills Group Therapy Type: Recovery skills, Healthy behaviors development, and Relationship skills development    Summary of Group / Topics Discussed:    Patient attended Lecture on  Relationships  and  Setting Healthy Boundaries  Patient engaged in Q&A after the lecture was presented. Patient actively engaged in group lecture.    Patient engaged in the topic throughout the session by asking questions and participating appropriately. Patient gained insight into past behaviors and how to make changes moving forward.         Group Attendance:  Attended group session    Patient's response to the group topic/interactions:  cooperative with task    Patient appeared to be Attentive.         Client specific details:  Jose Alberto, was attentive during lecture.

## 2024-01-22 ENCOUNTER — HOSPITAL ENCOUNTER (OUTPATIENT)
Dept: BEHAVIORAL HEALTH | Facility: CLINIC | Age: 49
Discharge: HOME | End: 2024-01-22
Attending: FAMILY MEDICINE
Payer: MEDICAID

## 2024-01-22 LAB — SARS-COV-2 RNA RESP QL NAA+PROBE: NEGATIVE

## 2024-01-22 PROCEDURE — H2035 A/D TX PROGRAM, PER HOUR: HCPCS | Mod: HQ

## 2024-01-22 PROCEDURE — 1002N00001 HC LODGING PLUS FACILITY CHARGE ADULT

## 2024-01-22 PROCEDURE — 87635 SARS-COV-2 COVID-19 AMP PRB: CPT | Performed by: FAMILY MEDICINE

## 2024-01-22 NOTE — GROUP NOTE
Group Therapy Documentation    PATIENT'S NAME: Jose Alberto Cuellar  MRN:   [id number]  :   1975  ACCT. NUMBER: [id number]  DATE OF SERVICE: 24  START TIME:  9:00 AM  END TIME: 11:00 AM  FACILITATOR(S): Stewart Arechiga LADC  TOPIC: BEH Group Therapy  Number of patients attending the group:    Group Length:  2 Hours    Group Therapy Type: Recovery strategies and Emotion processing    Summary of Group / Topics Discussed:    Recovery Principles, Sober coping skills, Balanced lifestyle, Trauma informed care, Disease of addiction, Relapse prevention, and Self-care activities      Group Attendance:  Attended group session    Patient's response to the group topic/interactions:  cooperative with task, discussed personal experience with topic, expressed readiness to alter behaviors, expressed understanding of topic, gave appropriate feedback to peers, and listened actively    Patient appeared to be Actively participating, Attentive, and Engaged.        Client specific details:  Jose Alberto gave appropriate feedback. .

## 2024-01-23 ENCOUNTER — HOSPITAL ENCOUNTER (OUTPATIENT)
Dept: BEHAVIORAL HEALTH | Facility: CLINIC | Age: 49
Discharge: HOME | End: 2024-01-23
Attending: FAMILY MEDICINE
Payer: MEDICAID

## 2024-01-23 LAB — SARS-COV-2 RNA RESP QL NAA+PROBE: NEGATIVE

## 2024-01-23 PROCEDURE — 1002N00001 HC LODGING PLUS FACILITY CHARGE ADULT

## 2024-01-23 PROCEDURE — 87635 SARS-COV-2 COVID-19 AMP PRB: CPT | Performed by: FAMILY MEDICINE

## 2024-01-23 PROCEDURE — H2035 A/D TX PROGRAM, PER HOUR: HCPCS | Mod: HQ

## 2024-01-23 RX ORDER — TRAZODONE HYDROCHLORIDE 50 MG/1
50 TABLET, FILM COATED ORAL AT BEDTIME
Qty: 30 TABLET | Refills: 0 | Status: SHIPPED | OUTPATIENT
Start: 2024-01-23 | End: 2024-01-26

## 2024-01-23 NOTE — GROUP NOTE
Group Therapy Documentation    PATIENT'S NAME: Jose Alberto Cuellar  MRN:   [id number]  :   1975  ACCT. NUMBER: [id number]  DATE OF SERVICE: 24  START TIME:  3:00 PM  END TIME:  4:00 PM  FACILITATOR(S): Constantine Malloy LADC; Pebbles Herrera LADC  TOPIC: BEH Group Therapy  Number of patients attending the group:  6  Group Length:  1 Hours    Group Therapy Type: Daily living/independence skills    Summary of Group / Topics Discussed:    Sober coping skills      Group Attendance:  Attended group session    Patient's response to the group topic/interactions:  cooperative with task    Patient appeared to be Actively participating.        Client specific details:  Jose Alberto participated and interacted appropriately with peers and staff in skills group. No triggers to use noted or discussed.

## 2024-01-23 NOTE — TELEPHONE ENCOUNTER
Rx sent  1. Insomnia, unspecified type    - traZODone (DESYREL) 50 MG tablet; Take 1 tablet (50 mg) by mouth at bedtime  Dispense: 30 tablet; Refill: 0

## 2024-01-23 NOTE — GROUP NOTE
"Group Therapy Documentation    PATIENT'S NAME: Jose Alberto Cuellar  MRN:   [id number]  :   1975  ACCT. NUMBER: [id number]  DATE OF SERVICE: 24  START TIME:  9:00 AM  END TIME: 11:00 AM  FACILITATOR(S): Stewart Arechiga LADC  TOPIC: BEH Group Therapy  Number of patients attending the group:  9  Group Length:  2 Hours    Group Therapy Type: Recovery strategies and Emotion processing    Summary of Group / Topics Discussed:    Recovery Principles, Sober coping skills, Balanced lifestyle, Disease of addiction, Emotions/expression, and Relapse prevention      Group Attendance:  Attended group session    Patient's response to the group topic/interactions:  cooperative with task, discussed personal experience with topic, expressed readiness to alter behaviors, expressed understanding of topic, gave appropriate feedback to peers, and listened actively    Patient appeared to be Actively participating, Attentive, and Engaged.        Client specific details:  Jose Alberto gave appropriate feedback. .He presented his \"First Step\" assignment.     "

## 2024-01-23 NOTE — GROUP NOTE
Group Therapy Documentation    PATIENT'S NAME: Jose Alberto Cuellar  MRN:   [id number]  :   1975  ACCT. NUMBER: [id number]  DATE OF SERVICE: 24  START TIME: 12:30 PM  END TIME:  2:30 PM  FACILITATOR(S): Randi Lord LADC  TOPIC: BEH Group Therapy  Number of patients attending the group:  6  Group Length:  2 Hours    Group Therapy Type: Emotion processing    Summary of Group / Topics Discussed:    Disease of addiction and Emotions/expression      Group Attendance:  Attended group session    Patient's response to the group topic/interactions:  cooperative with task    Patient appeared to be Actively participating, Attentive, and Engaged.        Client specific details: Jose Alberto participated in discussion on goal-setting and being non-judgemental. He also offered supportive feedback to a peer who shared his First Step assignment.

## 2024-01-24 ENCOUNTER — HOSPITAL ENCOUNTER (OUTPATIENT)
Dept: BEHAVIORAL HEALTH | Facility: CLINIC | Age: 49
Discharge: HOME | End: 2024-01-24
Attending: FAMILY MEDICINE
Payer: MEDICAID

## 2024-01-24 LAB — SARS-COV-2 RNA RESP QL NAA+PROBE: NEGATIVE

## 2024-01-24 PROCEDURE — 1002N00001 HC LODGING PLUS FACILITY CHARGE ADULT

## 2024-01-24 PROCEDURE — 87635 SARS-COV-2 COVID-19 AMP PRB: CPT | Performed by: FAMILY MEDICINE

## 2024-01-24 PROCEDURE — H2035 A/D TX PROGRAM, PER HOUR: HCPCS | Mod: HQ

## 2024-01-24 NOTE — GROUP NOTE
Group Therapy Documentation    PATIENT'S NAME: Jose Albreto Cuellar  MRN:   [id number]  :   1975  ACCT. NUMBER: [id number]  DATE OF SERVICE: 24  START TIME:  9:40 AM  END TIME: 11:20 AM  FACILITATOR(S): Stewart Arechiga LADC  TOPIC: BEH Group Therapy  Number of patients attending the group:  9  Group Length:  2 Hours    Group Therapy Type: Recovery strategies and Emotion processing    Summary of Group / Topics Discussed:    Recovery Principles, Sober coping skills, Balanced lifestyle, Disease of addiction, Emotions/expression, and Relapse prevention      Group Attendance:  Attended group session    Patient's response to the group topic/interactions:  cooperative with task, discussed personal experience with topic, expressed readiness to alter behaviors, expressed understanding of topic, gave appropriate feedback to peers, and listened actively    Patient appeared to be Actively participating, Attentive, and Engaged.        Client specific details:  Jose Alberto gave appropriate feedback. .

## 2024-01-24 NOTE — GROUP NOTE
Group Therapy Documentation    PATIENT'S NAME: Jose Alberto Cuellar  MRN:   [id number]  :   1975  ACCT. NUMBER: [id number]  DATE OF SERVICE: 24  START TIME: 12:30 PM  END TIME:  2:30 PM  FACILITATOR(S): Johann Townsend LADC  TOPIC: BEH Group Therapy  Number of patients attending the group:  4  Group Length:  2 Hours    Group Therapy Type: Recovery strategies    Summary of Group / Topics Discussed:    Recovery Principles, Mindfulness/Relaxation, Coping/DBT informed care, Trauma informed care, Disease of addiction, and Emotions/expression      Group Attendance:  Attended group session    Patient's response to the group topic/interactions:  cooperative with task    Patient appeared to be Attentive and Engaged.        Client specific details:  Jose Alberto participated in afternoon group. He watched a documentary on The Heroes Journey. This was followed by an activity where the group discussed how the Heroes Journey applied to recovery. He used a template to make his own recovery Heroes Journey and discussed it with the group.

## 2024-01-24 NOTE — GROUP NOTE
Psychoeducation Group Documentation    PATIENT'S NAME: Jose Alberto Cuellar  MRN:   [id number]  :   1975  ACCT. NUMBER: [id number]  DATE OF SERVICE: 24  START TIME:  8:30 AM  END TIME:  9:30 AM  FACILITATOR(S): Stewart Arechiga LADC; Johann Townsend LADC  TOPIC: BEH Pyschoeducation  Number of patients attending the group:  25  Group Length:  1 Hours    Skills Group Therapy Type: Recovery skills    Summary of Group / Topics Discussed:    Relapse prevention skills        Group Attendance:  Attended group session    Patient's response to the group topic/interactions:  cooperative with task and listened actively    Patient appeared to be Attentive.         Client specific details:  Jose Alberto gave appropriate feedback. .

## 2024-01-24 NOTE — PROGRESS NOTES
[facility] Weekly Treatment Plan Review    Treatment plan review for the following date span:  1/18/24-1/24/24    ATTENDANCE  Patient did not have any absences during this time period (list absence dates and reason for absence).        Weekly Treatment Plan Review     Treatment Plan initiated on: 1/20/24.    Dimension1: Acute Intoxication/Withdrawal Potential -   Date of Last Use: Patient reports his last use date for alcohol as 1/8/24.   Any reports of withdrawal symptoms - No    Dimension 2: Biomedical Conditions & Complications -   Medical Concerns: None reported  Vitals:   BP Readings from Last 3 Encounters:   01/18/24 (!) 175/116   01/18/24 (!) 171/112   01/12/24 (!) 132/92     Pulse Readings from Last 3 Encounters:   01/18/24 88   01/18/24 79   01/12/24 67     Wt Readings from Last 3 Encounters:   01/10/24 103.8 kg (228 lb 12.8 oz)     Temp Readings from Last 3 Encounters:   01/18/24 97.7  F (36.5  C) (Oral)   01/18/24 99.1  F (37.3  C)   01/12/24 97.8  F (36.6  C) (Oral)      Current Medications & Medication Changes:  Current Outpatient Medications   Medication    traZODone (DESYREL) 50 MG tablet    acetaminophen (TYLENOL) 325 MG tablet    alum & mag hydroxide-simethicone (MAALOX) 200-200-20 MG/5ML SUSP suspension    amLODIPine (NORVASC) 10 MG tablet    benzocaine-menthol (CEPACOL) 15-3.6 MG lozenge    guaiFENesin-dextromethorphan (ROBITUSSIN DM) 100-10 MG/5ML syrup    ibuprofen (ADVIL/MOTRIN) 200 MG tablet    levothyroxine (SYNTHROID/LEVOTHROID) 75 MCG tablet    loratadine (CLARITIN) 10 MG tablet    melatonin 3 MG tablet    multivitamin w/minerals (THERA-VIT-M) tablet    nicotine (NICODERM CQ) 21 MG/24HR 24 hr patch    senna-docusate (SENOKOT-S/PERICOLACE) 8.6-50 MG tablet    testosterone cypionate (DEPOTESTOSTERONE) 200 MG/ML injection    triamcinolone (ARISTOCORT HP) 0.5 % external cream    venlafaxine (EFFEXOR) 37.5 MG tablet     No current facility-administered medications for this encounter.  "    Taking meds as prescribed? Yes  Medication side effects or concerns: None reported  Outside medical appointments this week (list provider and reason for visit): None reported      Dimension 3: Emotional/Behavioral Conditions & Complications -   Mental health diagnosis: Patient reports a formal diagnosis of depression and anxiety.     Date of last SIB: N/A  Date of  last SI: N/A  Date of last HI: N/A  Behavioral Targets:  Stabilize and maintain mental health.  Current MH Assignments: Complete Overcoming Negative Thinking assignment.    PHQ2:       1/24/2024     5:00 PM   PHQ-2 ( 1999 [employer])   Q1: Little interest or pleasure in doing things 1   Q2: Feeling down, depressed or hopeless 1   PHQ-2 Score 2      GAD2:       1/24/2024     5:00 PM   CINTHIA-2   Feeling nervous, anxious, or on edge 1   Not being able to stop or control worrying 1   CINTHIA-2 Total Score 2       Additional Narrative:  Current Mental Health symptoms include: Patient reports \"depression, anxiety, and guilt and shame.\"  Active interventions to stabilize mental health symptoms this week: The patient reported that their mood has not significantly changed this past week. The patient reported \"no change\" in their stress level this past week. The patient reported that the coping skills they used to manage their stress and difficult emotions were \"exercise, breathing, and reading.\"        Dimension 4: Treatment Acceptance / Resistance -   JACKI Diagnosis: Alcohol Use Disorder, Severe F10.20-(303.90)   Commitment to tx process/Stage of change- The patient appears to be in the contemplation stage of change.  JACKI assignments - Complete First Step and Drug Use History.  Additional Narrative - The patient reported that their main motivation to stay sober and in treatment this past week was \"my health and my future.\"       Dimension 5: Relapse / Continued Problem Potential -   Relapses this week - None  Urges to use - None  UA results - The patients last UA " "results on 1/18/24 were all negative except for BZO.  Identified triggers - None identified.  Coping skills identified - Patient reports \"just being here in a safe place and social atmosphere.\" Patient is able to utilize these skills when needed.    Additional Narrative- Patient reports having cravings this past week. Pt reports craving 0/10, ten being high. They reported not experiencing any triggers to use. They identified the following coping skill(s): \"just being here in a safe place and social atmosphere.\" Patient participated in the spirituality group, facilitated by Adwoa Beavers and  counseling staff was present during group. They participated in weekend workshop on relapse prevention and completed all activities.     Dimension 6: Recovery Environment -   Family Involvement -   Community support group attendance - Patient has been attending nightly 12-step meetings/lectures while at .  Recreational activities - Patient reported \"ping pong and board games.\"  Peer Relationships - Patient reported \"yes\" when asked of had gotten along with staff and peers.    Additional Narrative - Narrative - Patient has been spending time with same gender-peers and connecting with/building a sober support network. Patient reports their aftercare plan will include \"not sure.\" They participated in weekend workshop on relationships and completed all activities.     Progress made on transition planning goals: None reported.    Justification for Continued Treatment at this Level of Care:  Risk ratings indicate continued need for this level of care. Patient has been unable to maintain abstinence from alcohol while at the outpatient level of care, lacks long-term sober maintenance skills, lacks sober coping skills and has mental health concerns which are exacerbated by substance use.  Treatment coordination activities this week: None reported.  Need for peer recovery support referral? No    Discharge Planning:  Target Discharge " Date/Timeframe: TBD   Med Mgmt Provider/Appt: TBD   Ind therapy Provider/Appt: TBD   Other referrals: TBD        Dimension Scale Review     Prior ratings: Dim1 - 0 DIM2 - 0 DIM3 - 2 DIM4 - 2 DIM5 - 4 DIM6 -4     Current ratings: Dim1 - 0 DIM2 - 0 DIM3 - 2 DIM4 - 2 DIM5 - 4 DIM6 -4       If client is 18 or older, has vulnerable adult status change? No    Are Treatment Plan goals/objectives effective? Yes  *If no, list changes to treatment plan:    Are the current goals meeting client's needs? Yes  *If no, list the changes to treatment plan.      *Client agrees with any changes to the treatment plan: N/A  *Client received copy of changes: N/A  *Client is aware of right to access a treatment plan review: Yes    Johann Townsend, [facility]

## 2024-01-25 ENCOUNTER — HOSPITAL ENCOUNTER (OUTPATIENT)
Dept: BEHAVIORAL HEALTH | Facility: CLINIC | Age: 49
Discharge: HOME | End: 2024-01-25
Attending: FAMILY MEDICINE
Payer: MEDICAID

## 2024-01-25 ENCOUNTER — HOSPITAL ENCOUNTER (OUTPATIENT)
Dept: BEHAVIORAL HEALTH | Facility: CLINIC | Age: 49
Discharge: HOME | End: 2024-01-25
Attending: FAMILY MEDICINE | Admitting: FAMILY MEDICINE
Payer: MEDICAID

## 2024-01-25 LAB — SARS-COV-2 RNA RESP QL NAA+PROBE: NEGATIVE

## 2024-01-25 PROCEDURE — H2035 A/D TX PROGRAM, PER HOUR: HCPCS | Mod: HQ

## 2024-01-25 PROCEDURE — 999N000216 HC STATISTIC ADULT CD FACE TO FACE-NO CHRG: Performed by: COUNSELOR

## 2024-01-25 PROCEDURE — 87635 SARS-COV-2 COVID-19 AMP PRB: CPT | Performed by: FAMILY MEDICINE

## 2024-01-25 PROCEDURE — 90791 PSYCH DIAGNOSTIC EVALUATION: CPT | Performed by: COUNSELOR

## 2024-01-25 PROCEDURE — 1002N00001 HC LODGING PLUS FACILITY CHARGE ADULT

## 2024-01-25 ASSESSMENT — ANXIETY QUESTIONNAIRES
IF YOU CHECKED OFF ANY PROBLEMS ON THIS QUESTIONNAIRE, HOW DIFFICULT HAVE THESE PROBLEMS MADE IT FOR YOU TO DO YOUR WORK, TAKE CARE OF THINGS AT HOME, OR GET ALONG WITH OTHER PEOPLE: EXTREMELY DIFFICULT
7. FEELING AFRAID AS IF SOMETHING AWFUL MIGHT HAPPEN: NEARLY EVERY DAY
4. TROUBLE RELAXING: SEVERAL DAYS
1. FEELING NERVOUS, ANXIOUS, OR ON EDGE: MORE THAN HALF THE DAYS
2. NOT BEING ABLE TO STOP OR CONTROL WORRYING: MORE THAN HALF THE DAYS
GAD7 TOTAL SCORE: 12
6. BECOMING EASILY ANNOYED OR IRRITABLE: SEVERAL DAYS
3. WORRYING TOO MUCH ABOUT DIFFERENT THINGS: MORE THAN HALF THE DAYS
GAD7 TOTAL SCORE: 12
5. BEING SO RESTLESS THAT IT IS HARD TO SIT STILL: SEVERAL DAYS

## 2024-01-25 ASSESSMENT — PATIENT HEALTH QUESTIONNAIRE - PHQ9: SUM OF ALL RESPONSES TO PHQ QUESTIONS 1-9: 19

## 2024-01-25 NOTE — GROUP NOTE
Group Therapy Documentation    PATIENT'S NAME: Jose Alberto Cuellar  MRN:   [id number]  :   1975  ACCT. NUMBER: [id number]  DATE OF SERVICE: 24  START TIME:  3:00 PM  END TIME:  4:00 PM  FACILITATOR(S): Yung Valverde LADC  TOPIC: BEH Group Therapy  Number of patients attending the group:  19  Group Length:  2 Hours    Group Therapy Type: Recovery strategies and Daily living/independence skills    Summary of Group / Topics Discussed:    Sober coping skills and Relapse prevention    Group lecture was presented by  staff on the topic of relapse prevention skills. Participants provided feedback through group session.      Group Attendance:  Attended group session    Patient's response to the group topic/interactions:  cooperative with task    Patient appeared to be Actively participating, Attentive, and Engaged.        Client specific details:  Patient actively engaged in group discussion.

## 2024-01-25 NOTE — PROGRESS NOTES
"[facility]      PATIENT'S NAME: Jose Alberto Cuellar  PREFERRED NAME: Jose Alberto  PRONOUNS: he/him/his     MRN: [id number]  : 1975  ADDRESS: [address] [facility] [address]  [facility] [address]  ACCT. NUMBER:  [id number]  DATE OF SERVICE: 24  START TIME: 8:23 AM  END TIME: 10:19 AM  PREFERRED PHONE: [phone]  May we leave a program related message: Yes  EMERGENCY CONTACT: was obtained for Brianna Cuellar (Parent) [phone] (Mobile) .  SERVICE MODALITY:  In-person    [facility] ADULT Mental Health DIAGNOSTIC ASSESSMENT    Identifying Information:  Patient is a 48 year old,    individual.  Patient was referred for an assessment by  [facility] .  Patient attended the session alone.    Chief Complaint:   The reason for seeking services at this time is: \" depression and anxiety, always been depressed and experienced anxiety after using drugs and alcohol \"   The problem(s) began a long time ago. Patient has attempted to resolve these concerns in the past through has seen a therapist a couple time, nothing consistent and medications for mental health .    Social/Family History:  Patient reported that he grew up in  [place], [place]. He was raised by biological parents.  Parents stayed ..   Patient reported that his childhood was normal.  Patient described his current relationships with family of origin as good.      The patient describes their cultural background as .  Cultural influences and impact on patient's life structure, values, norms, and healthcare: Spiritual Beliefs: Restoration .  Contextual influences on patient's health include: Contextual Factors: Individual Factors   .  Cultural, Contextual, and socioeconomic factors do not affect the patient's access to services.  These factors will be addressed in the Preliminary Treatment plan.  Patient identified his preferred language to be English. Patient reported that he does not need the " assistance of an  or other support involved in therapy.     Patient reported had no significant delays in developmental tasks.   Patient's highest education level was some college. Patient identified the following learning problems: none reported.  Modifications will be used to assist communication in therapy.  Patient reports that he is  able to understand written materials.    Patient reported the following relationship history :single never .  Patient's current relationship status is single for 5 years.   Patient identified their sexual orientation as heterosexual.  Patient reported having zero child(allan). Patient identified mother, father, and sisters  as part of their support system.  Patient identified the quality of these relationships as good.     Patient's current living/housing situation involves staying in own home/apartment.  Lives in an apartment alone and reports that housing is stable.     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  Patient reports that his finances are obtained through  savings .  Patient does identify finances as a current stressor.      Patient reported that he has been involved with the legal system.  2 DUIs in college. No current charges or probation. Patient denies being on probation / parole / under the jurisdiction of the court.    Patient's Strengths and Limitations:  Patient identified the following strengths or resources that will help them succeed in treatment: commitment to health and well being, community involvement, exercise routine, friends / good social support, family support, insight, motivation, sober support group / recovery support , and sponsor. Things that may interfere with the patient's success in treatment include: few friends, financial hardship, and lack of social support.     Assessments:  The following assessments were completed by patient for this visit:  PHQ9:        1/18/2024    12:00 PM 1/25/2024     8:00 AM   PHQ-9 SCORE   PHQ-9 Total Score 18 19     GAD7:       1/18/2024    12:00 PM 1/25/2024     8:00 AM   CINTHIA-7 SCORE   Total Score 15 12     CAGE-AID:       1/25/2024     8:00 AM   CAGE-AID Total Score   Total Score 3     PROMIS 10-Global Health (all questions and answers displayed):       1/25/2024     8:00 AM   PROMIS 10   In general, would you say your health is: 1   In general, would you say your quality of life is: 2   In general, how would you rate your physical health? 2   In general, how would you rate your mental health, including your mood and your ability to think? 2   In general, how would you rate your satisfaction with your social activities and relationships? 2   In general, please rate how well you carry out your usual social activities and roles. (This includes activities at home, at work and in your community, and responsibilities as a parent, child, spouse, employee, friend, etc.) 1   To what extent are you able to carry out your everyday physical activities such as walking, climbing stairs, carrying groceries, or moving a chair? 5   In the past 7 days, how often have you been bothered by emotional problems such as feeling anxious, depressed, or irritable? 4   In the past 7 days, how would you rate your fatigue on average? 4   In the past 7 days, how would you rate your pain on average, where 0 means no pain, and 10 means worst imaginable pain? 2   Global Mental Health Score 8   Global Physical Health Score 13   PROMIS TOTAL - SUBSCORES 21     Alexander Suicide Severity Rating Scale (Lifetime/Recent)      1/9/2024     3:19 PM 1/18/2024     1:00 PM   Alexander Suicide Severity Rating (Lifetime/Recent)   Q1 Wish to be Dead (Lifetime)  Yes   Comments  passive and active ideation   Q2 Non-Specific Active Suicidal Thoughts (Lifetime)  Yes   Q1 Wished to be Dead (Past Month) yes yes   Q2 Suicidal Thoughts (Past Month) yes no   Q3 Suicidal Thought Method no no   Q4  Suicidal Intent without Specific Plan no no   Q5 Suicide Intent with Specific Plan no no   Q6 Suicide Behavior (Lifetime)  no   Level of Risk per Screen low risk low risk       Personal and Family Medical History:  Patient does not report a family history of mental health concerns.  Patient reports family history is not on file..     Patient does report Mental Health Diagnosis and/or Treatment.  PPatient reported the following previous diagnoses which include(s): an Anxiety Disorder and Depression.  Patient reported symptoms began a long time ago.   Patient has received mental health services in the past:  therapy and medications .  Psychiatric Hospitalizations: None.  Patient denies a history of civil commitment.  Patient is not receiving other mental health services.  These include none.       Patient has had a physical exam to rule out medical causes for current symptoms.  Date of last physical exam was within the past year. Client was encouraged to follow up with PCP if symptoms were to develop. The patient does not have a Primary Care Provider and was encouraged to establish care with a PCP..  Patient reports no current medical and/or dental concerns.  Patient denies any issues with pain..   There are not significant appetite / nutritional concerns / weight changes. These may include: no concerns. Patient reports the following sleep concerns:  Delayed sleep onset at nights.   Patient does not report a history of head injury / trauma / cognitive impairment.      Patient reports current meds as:   Outpatient Medications Marked as Taking for the 1/25/24 encounter (Hospital [facility]   Medication Sig    acetaminophen (TYLENOL) 325 MG tablet Take 325-650 mg by mouth every 4 hours as needed for mild pain    alum & mag hydroxide-simethicone (MAALOX) 200-200-20 MG/5ML SUSP suspension Take 30 mLs by mouth every 6 hours as needed for indigestion    amLODIPine (NORVASC) 10 MG  tablet Take 1 tablet (10 mg) by mouth daily    benzocaine-menthol (CEPACOL) 15-3.6 MG lozenge Place 1 lozenge inside cheek every 2 hours as needed for sore throat    guaiFENesin-dextromethorphan (ROBITUSSIN DM) 100-10 MG/5ML syrup Take 10 mLs by mouth every 4 hours as needed for cough    ibuprofen (ADVIL/MOTRIN) 200 MG tablet Take 400 mg by mouth every 6 hours as needed for pain    levothyroxine (SYNTHROID/LEVOTHROID) 75 MCG tablet Take 75 mcg by mouth daily    loratadine (CLARITIN) 10 MG tablet Take 10 mg by mouth daily as needed for allergies    melatonin 3 MG tablet Take 3 mg by mouth nightly as needed for sleep    multivitamin w/minerals (THERA-VIT-M) tablet Take 1 tablet by mouth daily    nicotine (NICODERM CQ) 21 MG/24HR 24 hr patch Place 1 patch onto the skin every 24 hours    senna-docusate (SENOKOT-S/PERICOLACE) 8.6-50 MG tablet Take 2 tablets by mouth daily as needed for constipation    traZODone (DESYREL) 50 MG tablet Take 1 tablet (50 mg) by mouth at bedtime    triamcinolone (ARISTOCORT HP) 0.5 % external cream APPLY TOPICALLY TO THE AFFECTED AREA TWICE DAILY FOR 14 DAYS    venlafaxine (EFFEXOR) 37.5 MG tablet TAKE 1 TABLET(37.5 MG) BY MOUTH TWICE DAILY WITH MEALS       Medication Adherence:  Patient reports taking prescribed medications as prescribed.    Patient Allergies:    Allergies   Allergen Reactions    Penicillins Angioedema and Rash     Converted from Drug Class Allergy: Penicillins       Medical History:  No past medical history on file.      Current Mental Status Exam:   Appearance:  Appropriate    Eye Contact:  Good   Psychomotor:  Normal       Gait / station:  no problem  Attitude / Demeanor: Cooperative   Speech      Rate / Production: Normal/ Responsive      Volume:  Normal  volume      Language:  intact  Mood:   Normal  Affect:   Appropriate    Thought Content: Clear   Thought Process: Coherent       Associations: No loosening of associations  Insight:   Poor   Judgment:  Intact  "  Orientation:  All  Attention/concentration: Good    Substance Use:   Patient did report a family history of substance use concerns; see medical history section for details.  Patient has received chemical dependency treatment in the past at  once .  Patient has been to detox three times.      Patient is currently receiving the following services: CD Treatment at [facility] . Patient reported the following problems as a result of his substance use:  DUI and legal issues.    Patient reports using alcohol Past year: drinking about 1.75L per day. He would drink 7-8 shooters throughout the day. He'd drink part of bottle at night and save the rest for the next day. Patient first started drinking at age 15.  Patient reported date of last use was 1/8/24.    Daily drinker for years.  Patient reports using tobacco cigarettes 1 ppd and Vapes: daily. Client started using tobacco at age 24.  Patient started using cannabis at age 17.  Patient reports last use was 30. heavy use.  Patient reports using caffeine 2 times per day and drinks 1 at a time. Patient started using caffeine at age 20.  Patient reports using Cocaine:HU: between 27-30 years old.  Patient reports using meth at age 18, HU: 36-38, last use was 37/38.  Patient reports using heroin once or twice in his 30s  Patient reports snorting/using fentanyl and various opiates at age 21.  Patient reports last use was Last use: 34/35  HU: 30-35. Fentanyl: Used once 12/31/23  Patient  reports using in college, Mushrooms & LSD: Used a few times. Last use was 12/23    Substance Use: \"sweats, anxiety, and shakes, and I would gag a lot.    Based on the positive CAGE score and clinical interview there  are indications of drug or alcohol abuse. Recommendation for substance abuse disorder evaluation with a substance use professional was given. Therapist did recommend client to reduce use or abstain from alcohol or substance use. Therapist did recommend structured treatment and or " community support (AA, 12 step group, etc.). NA .    Significant Losses / Trauma / Abuse / Neglect Issues:   Patient did not serve in the .  There are indications or report of significant loss, trauma, abuse or neglect issues related to: are no indications and client denies any losses, trauma, abuse, or neglect concerns.  Concerns for possible neglect are not present.     Safety Assessment:   Patient denies current homicidal ideation and behaviors.  Patient denies current self-injurious ideation and behaviors.    Patient denied risk behaviors associated with substance use.   Patient reported substance use associated with mental health symptoms.  Patient reports the following current concerns for his personal safety: None.  Patient reports there are no firearms in the house.       There are no firearms in the home..    History of Safety Concerns:  Patient denied a history of homicidal ideation.     Patient denied a history of personal safety concerns.    Patient denied a history of assaultive behaviors.    Patient denied a history of sexual assault behaviors.     Patient reported a history unsafe motor vehicle operation associated with substance use.  Patient reported a history of substance use associated with mental health symptoms.  Patient reports the following protective factors:      Risk Plan:  See Recommendations for Safety and Risk Management Plan    Review of Symptoms per patient report:   Depression: Change in sleep, Lack of interest, Excessive or inappropriate guilt, Change in energy level, Difficulties concentrating, Change in appetite, Suicidal ideation, Feelings of hopelessness, Feelings of helplessness, Low self-worth, Ruminations, Irritability, Feeling sad, down, or depressed, Withdrawn, Poor hygeine, and Frequent crying  Allison:  No Symptoms  Psychosis: No Symptoms  Anxiety: Excessive worry, Nervousness, Physical complaints, such as headaches, stomachaches, muscle tension, Sleep disturbance,  Ruminations, Poor concentration, and Irritability  Panic:  No symptoms  Post Traumatic Stress Disorder:  No Symptoms   Eating Disorder: No Symptoms  ADD / ADHD:  No symptoms  Conduct Disorder: No symptoms  Autism Spectrum Disorder: No symptoms  Obsessive Compulsive Disorder: No Symptoms    Patient reports the following compulsive behaviors and treatment history:  none reported .      Diagnostic Criteria:   Generalized Anxiety Disorder  A. Excessive anxiety and worry about a number of events or activities (such as work or school performance).   B. The person finds it difficult to control the worry.  C. Select 3 or more symptoms (required for diagnosis). Only one item is required in children.   - Restlessness or feeling keyed up or on edge.    - Being easily fatigued.    - Difficulty concentrating or mind going blank.    - Irritability.    - Muscle tension.    - Sleep disturbance (difficulty falling or staying asleep, or restless unsatisfying sleep).   D. The focus of the anxiety and worry is not confined to features of an Axis I disorder.  E. The anxiety, worry, or physical symptoms cause clinically significant distress or impairment in social, occupational, or other important areas of functioning.   F. The disturbance is not due to the direct physiological effects of a substance (e.g., a drug of abuse, a medication) or a general medical condition (e.g., hyperthyroidism) and does not occur exclusively during a Mood Disorder, a Psychotic Disorder, or a Pervasive Developmental Disorder. Major Depressive Disorder  CRITERIA (A-C) REPRESENT A MAJOR DEPRESSIVE EPISODE - SELECT THESE CRITERIA  A) Recurrent episode(s) - symptoms have been present during the same 2-week period and represent a change from previous functioning 5 or more symptoms (required for diagnosis)   - Depressed mood. Note: In children and adolescents, can be irritable mood.     - Diminished interest or pleasure in all, or almost all, activities.    -  Significant weight gainincrease in appetite.    - Decreased sleep.    - Fatigue or loss of energy.    - Feelings of worthlessness or inappropriate guilt.    - Diminished ability to think or concentrate, or indecisiveness.    - Recurrent thoughts of death (not just fear of dying), recurrent suicidal ideation without a specific plan, or a suicide attempt or a specific plan for committing suicide.   B) The symptoms cause clinically significant distress or impairment in social, occupational, or other important areas of functioning  C) The episode is not attributable to the physiological effects of a substance or to another medical condition  D) The occurence of major depressive episode is not better explained by other thought / psychotic disorders  E) There has never been a manic episode or hypomanic episode Substance Use Disorder Substance is often taken in larger amounts or over a longer period than was intended.  Met for:  Alcohol There is persistent desire or unsuccessful efforts to cut down or control use of the substance.  Met for:  Alcohol  A great deal of time is spent in activities necessary to obtain the substance, use the substance, or recover from its effects.  Met for:  Alcohol Craving, or a strong desire or urge to use the substance.  Met for:  Alcohol Recurrent use of the substance resulting in a failure to fulfill major role obligations at work, school, or home.  Met for:  Alcohol Continued use of the substance despite having persistent or recurrent social or interpersonal problems caused or exacerbated by the effects of its use.  Met for:  Alcohol Important social, occupational, or recreational activities are given up or reduced because of the substance.  Met for:  Alcohol Use of the substance is continued despite knowledge of having a persistent or recurrent physical or psychological problem that is likely to have been cause or exacerbated by the substance.  Met for:  Alcohol Tolerance:  either a need  for markedly increased amounts of the substance to achieve the desired effect or a markedly diminished effect with continued use of the same amount of the substance.  Met for:  Alcohol Withdrawal:  either patient endorses characteristic withdrawal syndrome for the substance or the substance (or closely related substance) is taken to relieve or avoid withdrawal symptoms.  Met for:  Alcohol    Functional Status:  Patient reports the following functional impairments:  self-care and work / vocational responsibilities.     Nonprogrammatic care:  Patient is requesting basic services to address current mental health concerns.    Clinical Summary:  1. Psychosocial, Cultural and Contextual Factors: unemployed.  2. Principal DSM5 Diagnoses  (Sustained by DSM5 Criteria Listed Above):   Substance-Related & Addictive Disorders Alcohol Use Disorder   303.90 (F10.20) Severe In a controlled environment.  3. Other Diagnoses that is relevant to services:   296.32 (F33.1) Major Depressive Disorder, Recurrent Episode, Moderate _ and With anxious distress  300.02 (F41.1) Generalized Anxiety Disorder.  4. Provisional Diagnosis: none .  5. Prognosis: Expect Improvement and Relieve Acute Symptoms.  6. Likely consequences of symptoms if not treated: patient's ongoing symptoms are more than likely to get worse and experience a decreased daily in functioning and may require a higher level of care.  7. Client strengths include:  caring, committed to sobriety, educated, goal-focused, has a previous history of therapy, insightful, intelligent, motivated, and support of family, friends and providers .     Recommendations:     1. Plan for Safety and Risk Management:   Safety and Risk: Recommended that patient call 911 or go to the local ED should there be a change in any of these risk factors..          Report to child / adult protection services was NA.     2. Patient's identified no gabriela / Confucianism / spiritual influences relevant to services at  this time.    3. Initial Treatment will focus on:   Depressed Mood -   Anxiety -   Functional Impairment at: work  Risk Management / Safety Concerns related to: Suicidal ideation  Alcohol / Substance Use -      4. Resources/Service Plan:    services are not indicated.   Modifications to assist communication are not indicated.   Additional disability accommodations are not indicated.      5. Collaboration:   Collaboration / coordination of treatment will be initiated with the following  support professionals: Targeted Case Management (TCM).      6.  Referrals:   The following referral(s) will be initiated:  TBD .       A Release of Information has been obtained for the following: Targeted Case Management (TCM).     Clinical Substantiation/medical necessity for the above recommendations:  Patient is a 48-year-old heterosexual  single male with a history of depression, anxiety and alcohol use disorder severe. Patient is currently attending [facility] to address his alcoholism problems. Patient presents with a lack long-term sober maintenance skills, lacks sober coping skills, lacks a sober peer support network, and has continued to use substances as a coping mechanism. Patient has had brief period of therapy, and medications in the past. At point, patient is unsure about his aftercare plan as to what he would do. However, he plans to follow his substances use counselor recommendations upon completing treatment.    7. JACKI:    JACKI:  Discussed the general effects of drugs and alcohol on health and well-being and Attend a sober community support program including AA, [facility], [facility], etc.).. Provider gave patient printed information about the effects of chemical use on his health and well being. Recommendations:  maintain abstinence.     8. Records:   These were reviewed at time of assessment.   Information in this assessment was obtained from the medical record and  provided by  patient who is a fair historian.    Patient will have open access to their mental health medical record.    9.   Interactive Complexity: No    10. Safety Plan:  Patient denied any current/recent/lifetime history of suicidal ideation and/or behaviors.  No safety plan indicated at this time.     Provider Name/ Credentials:  VICKY Hansen, ROWAN  Dual   Phone: [phone]  Fax: [phone]     January 25, 2024

## 2024-01-25 NOTE — GROUP NOTE
Psychoeducation Group Documentation    PATIENT'S NAME: Jose Alberto Cuellar  MRN:   [id number]  :   1975  ACCT. NUMBER: [id number]  DATE OF SERVICE: 24  START TIME: 12:30 PM  END TIME:  2:30 PM  FACILITATOR(S): Stewart Arechiga LADC; Adwoa Armenta  TOPIC: BEH Pyschoeducation  Number of patients attending the group:  8  Group Length:  2 Hours    Skills Group Therapy Type: Spiritual Care    Summary of Group / Topics Discussed:    Balanced lifestyle skills and Mindfulness/Relaxation skills        Group Attendance:  Attended group session    Patient's response to the group topic/interactions:  cooperative with task and listened actively    Patient appeared to be Attentive and Engaged.         Client specific details:  Jose Alberto gave appropriate feedback. .

## 2024-01-25 NOTE — GROUP NOTE
Group Therapy Documentation    PATIENT'S NAME: Jose Alberto Cuellar  MRN:   [id number]  :   1975  ACCT. NUMBER: [id number]  DATE OF SERVICE: 24  START TIME:  9:00 AM  END TIME: 11:00 AM  FACILITATOR(S): Stewart Arechiga LADC  TOPIC: BEH Group Therapy  Number of patients attending the group:  8  Group Length:  2 Hours    Group Therapy Type: Recovery strategies and Emotion processing    Summary of Group / Topics Discussed:    Recovery Principles, Sober coping skills, Balanced lifestyle, and Relapse prevention      Group Attendance:  Attended group session    Patient's response to the group topic/interactions:  confronted peers appropriately, discussed personal experience with topic, expressed readiness to alter behaviors, expressed understanding of topic, and listened actively    Patient appeared to be Actively participating, Attentive, and Engaged.        Client specific details:  Jose Alberto gave appropriate feedback. .

## 2024-01-25 NOTE — GROUP NOTE
Psychoeducation Group Documentation    PATIENT'S NAME: Jose Alberto Cuellar  MRN:   [id number]  :   1975  ACCT. NUMBER: [id number]  DATE OF SERVICE: 24  START TIME: 12:30 PM  END TIME:  2:30 PM  FACILITATOR(S): Stewart Arechiga LADC; Adwoa Armenta  TOPIC: BEH Pyschoeducation  Number of patients attending the group:  8  Group Length:  2 Hours    Skills Group Therapy Type: Emotion regulation skills    Summary of Group / Topics Discussed:    Balanced lifestyle skills and Symptom management skills          Group Attendance:  {Group Attendance:288438}    Patient's response to the group topic/interactions:  {OPBEHCLIENTRESPONSE:250476}    Patient appeared to be {Engagement:637380}.         Client specific details:  ***.

## 2024-01-26 ENCOUNTER — OFFICE VISIT (OUTPATIENT)
Dept: ADDICTION MEDICINE | Facility: CLINIC | Age: 49
End: 2024-01-26
Payer: MEDICAID

## 2024-01-26 ENCOUNTER — HOSPITAL ENCOUNTER (OUTPATIENT)
Dept: BEHAVIORAL HEALTH | Facility: CLINIC | Age: 49
Discharge: HOME | End: 2024-01-26
Attending: FAMILY MEDICINE
Payer: MEDICAID

## 2024-01-26 VITALS
BODY MASS INDEX: 33.98 KG/M2 | SYSTOLIC BLOOD PRESSURE: 125 MMHG | WEIGHT: 230.1 LBS | HEART RATE: 63 BPM | DIASTOLIC BLOOD PRESSURE: 81 MMHG | OXYGEN SATURATION: 100 %

## 2024-01-26 DIAGNOSIS — G47.00 INSOMNIA, UNSPECIFIED TYPE: ICD-10-CM

## 2024-01-26 DIAGNOSIS — F11.11 HISTORY OF OPIOID ABUSE (H): ICD-10-CM

## 2024-01-26 DIAGNOSIS — F15.91 HISTORY OF METHAMPHETAMINE USE: ICD-10-CM

## 2024-01-26 DIAGNOSIS — R74.01 TRANSAMINITIS: ICD-10-CM

## 2024-01-26 DIAGNOSIS — F10.20 ALCOHOL USE DISORDER, SEVERE, DEPENDENCE (H): Primary | ICD-10-CM

## 2024-01-26 DIAGNOSIS — F32.A DEPRESSION, UNSPECIFIED DEPRESSION TYPE: ICD-10-CM

## 2024-01-26 DIAGNOSIS — D69.6 THROMBOCYTOPENIA (H): ICD-10-CM

## 2024-01-26 LAB — SARS-COV-2 RNA RESP QL NAA+PROBE: NEGATIVE

## 2024-01-26 PROCEDURE — 1002N00001 HC LODGING PLUS FACILITY CHARGE ADULT

## 2024-01-26 PROCEDURE — H2035 A/D TX PROGRAM, PER HOUR: HCPCS | Mod: HQ

## 2024-01-26 PROCEDURE — 87635 SARS-COV-2 COVID-19 AMP PRB: CPT | Performed by: FAMILY MEDICINE

## 2024-01-26 PROCEDURE — 99205 OFFICE O/P NEW HI 60 MIN: CPT | Performed by: NURSE PRACTITIONER

## 2024-01-26 RX ORDER — FOLIC ACID 1 MG/1
1 TABLET ORAL DAILY
Qty: 30 TABLET | Refills: 0 | Status: ON HOLD | OUTPATIENT
Start: 2024-01-26 | End: 2024-02-29

## 2024-01-26 RX ORDER — LANOLIN ALCOHOL/MO/W.PET/CERES
100 CREAM (GRAM) TOPICAL DAILY
Qty: 30 TABLET | Refills: 0 | Status: SHIPPED | OUTPATIENT
Start: 2024-01-26 | End: 2024-02-26

## 2024-01-26 RX ORDER — VENLAFAXINE 37.5 MG/1
TABLET ORAL
Start: 2024-01-26 | End: 2024-02-14

## 2024-01-26 RX ORDER — TRAZODONE HYDROCHLORIDE 50 MG/1
50 TABLET, FILM COATED ORAL AT BEDTIME
Qty: 30 TABLET | Refills: 0 | Status: SHIPPED | OUTPATIENT
Start: 2024-01-26 | End: 2024-02-14

## 2024-01-26 ASSESSMENT — PAIN SCALES - GENERAL: PAINLEVEL: NO PAIN (0)

## 2024-01-26 NOTE — PROGRESS NOTES
"    SUBJECTIVE                                                      Jose Alberto Cuellar is a 48 year old male who presents for initial visit for addiction consultation and management referred by [facility] due to concerns for Alcohol Use Disorder (AUD).     Visit performed In Person, face-to-face    HPI: Jose Alberto Cuellar is a 48 year old male with history of alcohol use disorder, hypertension, central hypogonadism, hypothyroidism, depression who presents for further evaluation of substance use disorder and management options.    Alcohol use starting at age 14-15 yo. About every weekend. Starting around age of 19 yo, he started drinking \"all the time\" Parents owned a restaurant, easy access. [facility] [employer] drank every night. Periods of time 1-2 weeks when he would not drink. Was drinking \"for fun\" at that point. Over the past year started drinking during the day. Feels alcohol became problematic at this time. Quit drinking alcohol from age [age] yo. Thought he would be able to have a beer, returned to alcohol use, escalated. Detox admission 1/9 - 1/12/24, was drinking vodka \"shooters\" in the morning, continued to drink shooters during the day then would buy a bottle, 1.75 L per day. H/o of shaking and tremor from withdrawal. No seizure or DT history. Alcohol use was causing significant problems with work, hobbies, exercise etc. Ultimately had to quit his job.     OUD remission 10 years, was in [place] in December 2023, was drinking while someone drove him back to [place], was blacked out, woke up had done line of something, probable contamination with fentanyl. Was given narcan. H/o using percocet or Vicodin for hangovers. Was using Oxycotin on a daily basis in the past, used this for about 5 years, age [age] yo, not always daily. Was able to stop using opioids however does endorse significant withdrawal from this. Was prescribed suboxone for opioid dependence. Last on this Suboxone age 39, doctor " wanted him to stay on the medication, pt wanted to taper. H/o cocaine use, that escalated to methamphetamine use. Period of time when he was using stimulants daily, this was mid to late 20's. Methamphetamine from early to mid 30's. Last methamphetamine use 10 years ago. Reports it was not challenging to stop. Stimulant use disorder remission 10 years.     A problematic pattern of alcohol use leading to clinically significant impairment or distress, as manifested by at least 2 of the following, occurring within a 12-month period:  Alcohol is often taken in larger amounts or over a longer period than was intended.- YES   There is a persistent desire or unsuccessful efforts to cut down or control alcohol use. - YES   A great deal of time is spent in activities necessary to obtain alcohol, use alcohol, or recover from its effects. - YES   Craving, or a strong desire or urge to use alcohol. - NO   Recurrent alcohol use resulting in a failure to fulfill major role obligations at work, school, or home. - YES   Continued alcohol use despite having persistent or recurrent social or interpersonal problems caused or exacerbated by the effects of alcohol. - YES   Important social, occupational, or recreational activities are given up or reduced because of alcohol use. - YES   Recurrent alcohol use in situations in which it is physically hazardous. - YES   Alcohol use is continued despite knowledge of having a persistent or recurrent physical or psychological problem that is likely to have been caused or exacerbated by alcohol. 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       Substance Use History:   Most recent use, past or recent hx of harmful use  ALCOHOL - 1.75 L daily, last drink 1/9/24  CANNABIS - rare   PRESCRIPTION STIMULANTS - has tried in the past no recnet use   COCAINE/CRACK - last use 12 years ago.   METH/AMPHETAMINES - last use 10 years ago, h/o  "daily use   OPIATES - last use 10 year ago, h/o daily use, treated with Suboxone last at age 40 yo.   BENZODIAZEPINES - Has tried in the past no regular use.   KRATOM - denies   KETAMINE - has tried in the past   HALLUCINOGENS (including DXM) - has tried in the past, no regular use   BEHAVIORAL (Gambling, Eating d/o, Compulsivity) - denies   NICOTINE  Cigarettes: 1 pk per day started age 24, quit for 5 years resumed last year.   Chew/snus: denies  Vaping: daily   Past NRT/medication use: yes, some success       Previous withdrawal treatment episodes (e.g. detox): 3 times   Previous [facility] treatment programs: twice, currently [facility]. Previous completed [facility]   Hospitalizations or overdose: Once, [facility] 2024, unknowingly ingested fentanyl.   Medical complications from substance use: transaminitis and thrombocytopenia   IV Drug use?: denies   Previous Medication for Addiction Tx: Suboxone for OUD   Longest period of full abstinence: 4 years   Activities that have previously supported abstinence: working out, social, work, sports, recreational   Current Recovery Activities: [facility]     Infectious disease screening  Hep C: negative      HIV: negative No results found for: \"HIV\"     Psychiatric History (per patient report and problem list review)  Past diagnoses - depression and anxiety   Current or past psychiatrist: denies   Current or past therapist:  denies   Hospitalizations/TMS/ECT - denies   Suicide Attempts - denies   Medication- started venlafaxine       PHQ-9 scores:      1/18/2024    12:00 PM 1/25/2024     8:00 AM   PHQ   PHQ-9 Total Score 18 19   Q9: Thoughts of better off dead/self-harm past 2 weeks Several days Nearly every day     CINTHIA-7 scores:      1/18/2024    12:00 PM 1/25/2024     8:00 AM   CINTHIA-7 SCORE   Total Score 15 12       SOCIAL HISTORY:  Housing status: alone  Employment status: Unemployed, not seeking work  Relationship status: Single  Children: 0  Legal concerns related to use: denies "   Contact information up to date? Yes     3rd Party Involvement - denies  (please obtain ANUPAM if pt would like to include)    Medical History:    Patient Active Problem List    Diagnosis Date Noted    Alcohol dependence with withdrawal with complication (H) 01/09/2024     Priority: Medium       No past medical history on file.    No past surgical history on file.      No family history on file.  Sister with [facility]    Current Outpatient Medications   Medication Sig Dispense Refill    acetaminophen (TYLENOL) 325 MG tablet Take 325-650 mg by mouth every 4 hours as needed for mild pain      amLODIPine (NORVASC) 10 MG tablet Take 1 tablet (10 mg) by mouth daily 30 tablet 0    folic acid (FOLVITE) 1 MG tablet Take 1 tablet (1 mg) by mouth daily 30 tablet 0    levothyroxine (SYNTHROID/LEVOTHROID) 75 MCG tablet Take 75 mcg by mouth daily      multivitamin w/minerals (THERA-VIT-M) tablet Take 1 tablet by mouth daily 30 tablet 0    thiamine (B-1) 100 MG tablet Take 1 tablet (100 mg) by mouth daily 30 tablet 0    traZODone (DESYREL) 50 MG tablet Take 1 tablet (50 mg) by mouth at bedtime 30 tablet 0    triamcinolone (ARISTOCORT HP) 0.5 % external cream APPLY TOPICALLY TO THE AFFECTED AREA TWICE DAILY FOR 14 DAYS      venlafaxine (EFFEXOR) 37.5 MG tablet TAKE 1 TABLET(37.5 MG) BY MOUTH TWICE DAILY WITH MEALS      alum & mag hydroxide-simethicone (MAALOX) 200-200-20 MG/5ML SUSP suspension Take 30 mLs by mouth every 6 hours as needed for indigestion (Patient not taking: Reported on 1/26/2024)      benzocaine-menthol (CEPACOL) 15-3.6 MG lozenge Place 1 lozenge inside cheek every 2 hours as needed for sore throat (Patient not taking: Reported on 1/26/2024)      guaiFENesin-dextromethorphan (ROBITUSSIN DM) 100-10 MG/5ML syrup Take 10 mLs by mouth every 4 hours as needed for cough (Patient not taking: Reported on 1/26/2024)      ibuprofen (ADVIL/MOTRIN) 200 MG tablet Take 400 mg by mouth every 6 hours as needed for pain (Patient not  taking: Reported on 1/26/2024)      loratadine (CLARITIN) 10 MG tablet Take 10 mg by mouth daily as needed for allergies (Patient not taking: Reported on 1/26/2024)      melatonin 3 MG tablet Take 3 mg by mouth nightly as needed for sleep (Patient not taking: Reported on 1/26/2024)      nicotine (NICODERM CQ) 21 MG/24HR 24 hr patch Place 1 patch onto the skin every 24 hours (Patient not taking: Reported on 1/26/2024) 30 patch 0    senna-docusate (SENOKOT-S/PERICOLACE) 8.6-50 MG tablet Take 2 tablets by mouth daily as needed for constipation (Patient not taking: Reported on 1/26/2024)      testosterone cypionate (DEPOTESTOSTERONE) 200 MG/ML injection Inject 200 mg into the muscle every 14 days (Patient not taking: Reported on 1/18/2024)           Allergies   Allergen Reactions    Penicillins Angioedema and Rash     Converted from Drug Class Allergy: Penicillins       OBJECTIVE                                                      EXAM    /81   Pulse 63   Wt 104.4 kg (230 lb 1.6 oz)   SpO2 100%   BMI 33.98 kg/m      Physical Exam  Constitutional:       General: He is not in acute distress.     Appearance: Normal appearance. He is not ill-appearing or diaphoretic.   HENT:      Head: Normocephalic and atraumatic.      Nose: No congestion or rhinorrhea.   Eyes:      General: No scleral icterus.     Extraocular Movements: Extraocular movements intact.      Conjunctiva/sclera: Conjunctivae normal.      Pupils: Pupils are equal, round, and reactive to light.   Cardiovascular:      Rate and Rhythm: Normal rate.   Pulmonary:      Effort: Pulmonary effort is normal.   Neurological:      Mental Status: He is alert and oriented to person, place, and time.      Motor: Motor function is intact.      Coordination: Coordination is intact.      Gait: Gait is intact.   Psychiatric:         Attention and Perception: Attention and perception normal.         Mood and Affect: Mood and affect normal.         Speech: Speech normal.          Behavior: Behavior is cooperative.         Thought Content: Thought content normal. Thought content does not include suicidal ideation. Thought content does not include suicidal plan.         Cognition and Memory: Cognition and memory normal.      Comments: Insight adequate judgment appropriate              LAB    Hepatic Function  AST   Date Value Ref Range Status   01/09/2024 116 (H) 0 - 45 U/L Final     Comment:     Reference intervals for this test were updated on 6/12/2023 to more accurately reflect our healthy population. There may be differences in the flagging of prior results with similar values performed with this method. Interpretation of those prior results can be made in the context of the updated reference intervals.     ALT   Date Value Ref Range Status   01/09/2024 134 (H) 0 - 70 U/L Final     Comment:     Reference intervals for this test were updated on 6/12/2023 to more accurately reflect our healthy population. There may be differences in the flagging of prior results with similar values performed with this method. Interpretation of those prior results can be made in the context of the updated reference intervals.       Bilirubin Total   Date Value Ref Range Status   01/09/2024 0.4 <=1.2 mg/dL Final     Albumin   Date Value Ref Range Status   01/09/2024 5.1 3.5 - 5.2 g/dL Final     INR   Date Value Ref Range Status   01/10/2024 1.03 0.85 - 1.15 Final       CBC  WBC Count   Date Value Ref Range Status   01/12/2024 3.5 (L) 4.0 - 11.0 10e3/uL Final     RBC Count   Date Value Ref Range Status   01/12/2024 5.05 4.40 - 5.90 10e6/uL Final     Hemoglobin   Date Value Ref Range Status   01/12/2024 16.6 13.3 - 17.7 g/dL Final     Hematocrit   Date Value Ref Range Status   01/12/2024 49.1 40.0 - 53.0 % Final     MCV   Date Value Ref Range Status   01/12/2024 97 78 - 100 fL Final     MCH   Date Value Ref Range Status   01/12/2024 32.9 26.5 - 33.0 pg Final     MCHC   Date Value Ref Range Status    01/12/2024 33.8 31.5 - 36.5 g/dL Final     Platelet Count   Date Value Ref Range Status   01/12/2024 87 (L) 150 - 450 10e3/uL Final     RDW   Date Value Ref Range Status   01/12/2024 14.0 10.0 - 15.0 % Final       Today's lab data  Results for orders placed or performed during the hospital encounter of 01/26/24   Asymptomatic COVID-19 Virus (Coronavirus) by PCR Nose     Status: Normal    Specimen: Nose; Swab   Result Value Ref Range    SARS CoV2 PCR Negative Negative    Narrative    Testing was performed using the Xpert Xpress SARS-CoV-2 Assay on the Cepheid Gene-Xpert Instrument Systems. Additional information about this Emergency Use Authorization (EUA) assay can be found via the Lab Guide. This test should be ordered for the detection of SARS-CoV-2 in individuals who meet SARS-CoV-2 clinical and/or epidemiological criteria as well as from individuals without symptoms or other reasons to suspect COVID-19. Test performance for asymptomatic patients has only been established in anterior nasal swab specimens. This test is for in vitro diagnostic use under the FDA EUA for laboratories certified under CLIA to perform high complexity testing. This test has not been FDA cleared or approved. A negative result does not rule out the presence of PCR inhibitors in the specimen or target RNA concentration below the limit of detection for the assay. The possibility of a false negative should be considered if the patient's recent exposure or clinical presentation suggests COVID-19. This test was validated by the [facility]. This laboratory is certified under the Clinical Laboratory Improvement Amendments (CLIA) as qualified to perform high complexity laboratory testing.       [facility] Board of Pharmacy Data Base Reviewed;  Consistent with patient reports and Epic records.       A/P                                                      ASSESSMENT/PLAN:  1. Alcohol use disorder, severe,  dependence (H)  - Reviewed criteria met for AUD severe. Discussed recommendation for pharmacotherapy in combination with psychosocial interventions. Reviewed Naltrexone and Camrpal. Would recommended starting Naltrexone. Information provided in AVS. Pt declines starting mediation at this time.   - Thiamine/folic acid/multivit supplement   - continue programming at [facility]  - additional patient counseling as below   - thiamine (B-1) 100 MG tablet; Take 1 tablet (100 mg) by mouth daily  Dispense: 30 tablet; Refill: 0  - folic acid (FOLVITE) 1 MG tablet; Take 1 tablet (1 mg) by mouth daily  Dispense: 30 tablet; Refill: 0    2. Insomnia, unspecified type  Stable. Continue trazodone 50 mg po at hs.   - traZODone (DESYREL) 50 MG tablet; Take 1 tablet (50 mg) by mouth at bedtime  Dispense: 30 tablet; Refill: 0    3. Depression, unspecified depression type  Stable. Continue Venlafaxine 37.5 mg BID.   - venlafaxine (EFFEXOR) 37.5 MG tablet; TAKE 1 TABLET(37.5 MG) BY MOUTH TWICE DAILY WITH MEALS    4. Thrombocytopenia (H24)  - platelet count 85 on 1/11/23. Reviewed role of alcohol in bone marrow suppression. Recheck.   - CBC with platelets and differential; Future    5. Transaminitis  - elevated AST and ALT noted on lab from recent hospital admission. Recheck.   - Comprehensive metabolic panel (BMP + Alb, Alk Phos, ALT, AST, Total. Bili, TP); Future    6. History of opioid abuse (H)  - pt reports h/o, OUD, sustained remission x 10 years. Overdose requiring narcan 12/31/23 d/t accidental exposure to opioid. Last on Suboxone age 38 yo. No cravings or current concerns. Discussed Naltrexone for AUD may be favorable agent d/t h/o OUD.     7. History of methamphetamine use  - pt reports h/o use disorder, sustained remission x 10 yrs. No cravings or current concerns.       Counseled the patient on the importance of having a recovery program in addition to medication to manage recovery.  Components include avoiding isolating, having  willingness to change, avoiding triggers and managing cravings. Encouraged other services such as counseling, 12 step or other self-help organizations.      Opioid warning reviewed.  Risk of overdose following a period of abstinence due to decrease tolerance was discussed including risk of death.  Strongly recommended abstain from alcohol, benzodiazepines, THC, opioids and other drugs of abuse.  Increased risk of return to opioid use after use of these substances discussed.  Increased risk of overdose/death with use of other substances particularly benzodiazepines/alcohol reviewed.      RTC   4 weeks, earlier if needed       I sent a total of 60 minutes today, on the care of this patient. This consisted of face-to-face time as well as time spent on pre-visit and post-visit activities including coordination of care, chart review, results review, and documentation. CAROLYN Bradford CNP on 1/26/2024 at 12:45 PM      CAROLYN Bradford CNP  [facility] Addiction Medicine  [phone]

## 2024-01-26 NOTE — PATIENT INSTRUCTIONS
Alcohol Use Disorder  The diagnosis of alcohol use disorder is made using the DSM-V criteria for Substance Use Disorders - https://bit.ly/3DBJRId (link to Psychology Today report on these criteria). The FDA has 3 medications approved for alcohol use disorder - naltrexone (Vivitrol injection), acamprosate (Campral), and disulfiram (Antabuse).    For your reading purposes - a neurotransmitter is a chemical released in the brain that provides a signal directing the activity of nerves. The result is a predictable response based on the type of neurotransmitter. For example, serotonin, dopamine and norepinephrine (closely related to adrenaline) are all neurotransmitters.    Naltrexone (Vivitrol injection)  Naltrexone blocks the opioid receptors in the brain. It is basically naloxone (Narcan) in pill form. Why does an opioid blocker help with alcohol use? Our opioid receptors are part of the reward signaling during alcohol consumption, closely associated with dopamine release. Dopamine is a neurotransmitter synonymous with  reward . ALL substances that can cause addiction release dopamine at high levels. So, when naltrexone blocks opioid receptors, we feel less reward when consuming alcohol. This also leads to less craving BEFORE drinking alcohol, since craving is also associated with these neurotransmitters. The result is A) Fewer days drinking (higher chance of abstinence) and B) Less alcohol consumed if/when drinking occurs. This is prescribed 1 pill, 1 time per day. Please tell your provider if you have any liver damage, as this can change our management plan with naltrexone.    Naltrexone blocks the opioid receptor, so pain medications or illicit opioids will NOT provide the expected response. If you have an injury or a surgery, naltrexone will block the pain medications, so you need to talk to your provider to account for this.    Acamprosate (Campral)  Acamprosate provides the same results as naltrexone - A) Fewer  "days drinking (higher chance of abstinence) and B) Less alcohol consumed if/when drinking occurs. These two medications generally provide a similar chance of success. However, it acts differently in the brain/body. Alcohol directly impacts the activity of a pair of neurotransmitters - ADELSO and glutamate. Symptoms of alcohol withdrawal, and post-acute withdrawal, are partly driven by changes in these chemicals. Acamprosate  stabilizes  the way ADELSO and glutamate influence each other, largely by reducing the influence of glutamate (the \"excitatory\" factor, balanced by the \"inhibitory\" ADELSO). The result is usually subtle but ultimately patients can experience some anxiety relief, less restlessness, and more  leveled out . There is often craving relief as well. This is prescribed 2 pills, 3 times per day. Please tell your doctor if you have any kidney damage, as this can change our management plan with naltrexone.    "

## 2024-01-26 NOTE — GROUP NOTE
Group Therapy Documentation    PATIENT'S NAME: Jose Alberto Cuellar  MRN:   [id number]  :   1975  ACCT. NUMBER: [id number]  DATE OF SERVICE: 24  START TIME:  9:00 AM  END TIME: 11:00 AM  FACILITATOR(S): Stewart Arechiga LADC  TOPIC: BEH Group Therapy  Number of patients attending the group:  8  Group Length:  2 Hours    Group Therapy Type: Recovery strategies and Emotion processing    Summary of Group / Topics Discussed:    Recovery Principles, Sober coping skills, Relationship/socialization, Balanced lifestyle, Disease of addiction, Emotions/expression, and Relapse prevention      Group Attendance:  Attended group session    Patient's response to the group topic/interactions:  cooperative with task, discussed personal experience with topic, expressed readiness to alter behaviors, expressed understanding of topic, gave appropriate feedback to peers, and listened actively    Patient appeared to be Actively participating, Attentive, and Engaged.        Client specific details:  Jose Alberto gave appropriate feedback. .

## 2024-01-26 NOTE — GROUP NOTE
Group Therapy Documentation    PATIENT'S NAME: Jose Alberto Cuellar  MRN:   [id number]  :   1975  ACCT. NUMBER: [id number]  DATE OF SERVICE: 24  START TIME: 12:30 PM  END TIME:  2:30 PM  FACILITATOR(S): Veronica Castellano LADC; Tammy Leroy LADC  TOPIC: BEH Group Therapy  Number of patients attending the group:  17  Group Length:  2 Hours    Group Therapy Type: Recovery strategies    Summary of Group / Topics Discussed:    Sober coping skills, Relationship/socialization, and Leisure explorations/use of leisure time    Group Attendance:  Attended group session    Patient's response to the group topic/interactions:  cooperative with task    Patient appeared to be Attentive and Engaged.        Client specific details: Pt watched a recovery-related film with peers and participated in subsequent discussion.

## 2024-01-27 ENCOUNTER — HOSPITAL ENCOUNTER (OUTPATIENT)
Dept: BEHAVIORAL HEALTH | Facility: CLINIC | Age: 49
Discharge: HOME | End: 2024-01-27
Attending: FAMILY MEDICINE
Payer: MEDICAID

## 2024-01-27 PROCEDURE — 1002N00001 HC LODGING PLUS FACILITY CHARGE ADULT

## 2024-01-27 PROCEDURE — H2035 A/D TX PROGRAM, PER HOUR: HCPCS | Mod: HQ

## 2024-01-27 NOTE — GROUP NOTE
Group Therapy Documentation    PATIENT'S NAME: Jose Alberto Cuellar  MRN:   [id number]  :   1975  ACCT. NUMBER: [id number]  DATE OF SERVICE: 24  START TIME:  9:00 AM  END TIME: 11:00 AM  FACILITATOR(S): Johann Townsend LADC; Shawnee Wyatt LADC  TOPIC: BEH Group Therapy  Number of patients attending the group:  5  Group Length:  2 Hours    Group Therapy Type: Recovery strategies    Summary of Group / Topics Discussed:    Recovery Principles, Relationship/socialization, and Balanced lifestyle      Group Attendance:  Attended group session    Patient's response to the group topic/interactions:  cooperative with task    Patient appeared to be Attentive and Engaged.        Client specific details:  The patient took part in the morning lecture on boundaries and relationships.

## 2024-01-27 NOTE — GROUP NOTE
Group Therapy Documentation    PATIENT'S NAME: Jose Alberto Cuellar  MRN:   [id number]  :   1975  ACCT. NUMBER: [id number]  DATE OF SERVICE: 24  START TIME: 12:30 PM  END TIME:  2:30 PM  FACILITATOR(S): Johann Townsend LADC; Constantine Malloy LADC  TOPIC: BEH Group Therapy  Number of patients attending the group:  18  Group Length:  2 Hours    Group Therapy Type: Recovery strategies    Summary of Group / Topics Discussed:    Recovery Principles, Relationship/socialization, and Balanced lifestyle      Group Attendance:  Attended group session    Patient's response to the group topic/interactions:  cooperative with task    Patient appeared to be Attentive and Engaged.        Client specific details:  The patient took part in the afternoon lecture on families and relationships.

## 2024-01-28 ENCOUNTER — HOSPITAL ENCOUNTER (OUTPATIENT)
Dept: BEHAVIORAL HEALTH | Facility: CLINIC | Age: 49
Discharge: HOME | End: 2024-01-28
Attending: FAMILY MEDICINE
Payer: MEDICAID

## 2024-01-28 PROCEDURE — 1002N00001 HC LODGING PLUS FACILITY CHARGE ADULT

## 2024-01-28 PROCEDURE — H2035 A/D TX PROGRAM, PER HOUR: HCPCS | Mod: HQ

## 2024-01-28 NOTE — GROUP NOTE
Group Therapy Documentation    PATIENT'S NAME: Jose Alberto Cuellar  MRN:   [id number]  :   1975  ACCT. NUMBER: [id number]  DATE OF SERVICE: 24  START TIME:  8:45 AM  END TIME: 10:30 AM  FACILITATOR(S): Randi Lord LADC; Celi Dumont RN; Johann Townsend LADC  TOPIC: BEH Group Therapy  Number of patients attending the group:  20  Group Length:  2 Hours    Group Therapy Type: Health and wellbeing     Summary of Group / Topics Discussed:    Hepatitis C      Group Attendance:  Attended group session    Patient's response to the group topic/interactions:  cooperative with task    Patient appeared to be Attentive and Engaged.        Client specific details: Jose Alberto was an active participant in RN lecture on Hepatitis C.

## 2024-01-28 NOTE — GROUP NOTE
Psychoeducation Group Documentation    PATIENT'S NAME: Jose Alberto Cuellar  MRN:   [id number]  :   1975  ACCT. NUMBER: [id number]  DATE OF SERVICE: 24  START TIME: 12:30 PM  END TIME:  1:30 PM  FACILITATOR(S): Johann Townsend LADC  TOPIC: BEH Pyschoeducation  Number of patients attending the group:  20  Group Length:  1 Hours    Skills Group Therapy Type: Recovery skills and Healthy behaviors development    Summary of Group / Topics Discussed:    Relationship/social skills and Balanced lifestyle skills        Group Attendance:  Attended group session    Patient's response to the group topic/interactions:  cooperative with task    Patient appeared to be Attentive and Engaged.         Client specific details:  The patient participated in the afternoon lecture on families and addiction.

## 2024-01-29 ENCOUNTER — HOSPITAL ENCOUNTER (OUTPATIENT)
Dept: BEHAVIORAL HEALTH | Facility: CLINIC | Age: 49
Discharge: HOME | End: 2024-01-29
Attending: FAMILY MEDICINE
Payer: MEDICAID

## 2024-01-29 PROCEDURE — H2035 A/D TX PROGRAM, PER HOUR: HCPCS | Mod: HQ

## 2024-01-29 PROCEDURE — 1002N00001 HC LODGING PLUS FACILITY CHARGE ADULT

## 2024-01-29 NOTE — GROUP NOTE
Group Therapy Documentation    PATIENT'S NAME: Jose Alberto Cuellar  MRN:   [id number]  :   1975  ACCT. NUMBER: [id number]  DATE OF SERVICE: 24  START TIME:  9:00 AM  END TIME: 11:00 AM  FACILITATOR(S): Randi Lord LADC  TOPIC: BEH Group Therapy  Number of patients attending the group:  8  Group Length:  2 Hours    Group Therapy Type: Emotion processing    Summary of Group / Topics Discussed:    Sober coping skills and Disease of addiction      Group Attendance:  Attended group session    Patient's response to the group topic/interactions:  cooperative with task    Patient appeared to be Actively participating, Attentive, and Engaged.        Client specific details: Jose Alberto was an active participant in morning group therapy session. He participated in a goal-setting exercise and gave feedback to a peer who shared his drug use history.

## 2024-01-29 NOTE — GROUP NOTE
Group Therapy Documentation    PATIENT'S NAME: Jose Alberto Cuellar  MRN:   [id number]  :   1975  ACCT. NUMBER: [id number]  DATE OF SERVICE: 24  START TIME: 12:30 PM  END TIME:  2:30 PM  FACILITATOR(S): Johann Townsend LADC  TOPIC: BEH Group Therapy  Number of patients attending the group:  8  Group Length:  2 Hours    Group Therapy Type: Recovery strategies    Summary of Group / Topics Discussed:    Recovery Principles, Mindfulness/Relaxation, Coping/DBT informed care, Trauma informed care, Disease of addiction, and Emotions/expression      Group Attendance:  Attended group session    Patient's response to the group topic/interactions:  cooperative with task    Patient appeared to be Attentive and Engaged.        Client specific details:  Jose Alberto participated in afternoon group. He took part in a discussion on getting help in early sobriety, asking for help, and ways to become free of your addiction. He listened to and gave positive feedback on his peer's assignments.

## 2024-01-29 NOTE — PROGRESS NOTES
"[facility] Weekly Treatment Plan Review    Treatment plan review for the following date span:  1/22/24-1/28/24    ATTENDANCE  Patient did not have any absences during this time period (list absence dates and reason for absence).        Weekly Treatment Plan Review     Treatment Plan initiated on: 1/20/24.    Dimension1: Acute Intoxication/Withdrawal Potential -   Date of Last Use: Patient reports his last use date for alcohol as 1/8/24.   Any reports of withdrawal symptoms - No    Dimension 2: Biomedical Conditions & Complications -   Medical Concerns: Patient reported \"a little of everything.\"  Vitals:   BP Readings from Last 3 Encounters:   01/26/24 125/81   01/18/24 (!) 175/116   01/18/24 (!) 171/112     Pulse Readings from Last 3 Encounters:   01/26/24 63   01/18/24 88   01/18/24 79     Wt Readings from Last 3 Encounters:   01/26/24 104.4 kg (230 lb 1.6 oz)   01/10/24 103.8 kg (228 lb 12.8 oz)     Temp Readings from Last 3 Encounters:   01/18/24 97.7  F (36.5  C) (Oral)   01/18/24 99.1  F (37.3  C)   01/12/24 97.8  F (36.6  C) (Oral)      Current Medications & Medication Changes:  Current Outpatient Medications   Medication    acetaminophen (TYLENOL) 325 MG tablet    alum & mag hydroxide-simethicone (MAALOX) 200-200-20 MG/5ML SUSP suspension    amLODIPine (NORVASC) 10 MG tablet    benzocaine-menthol (CEPACOL) 15-3.6 MG lozenge    folic acid (FOLVITE) 1 MG tablet    guaiFENesin-dextromethorphan (ROBITUSSIN DM) 100-10 MG/5ML syrup    ibuprofen (ADVIL/MOTRIN) 200 MG tablet    levothyroxine (SYNTHROID/LEVOTHROID) 75 MCG tablet    loratadine (CLARITIN) 10 MG tablet    melatonin 3 MG tablet    multivitamin w/minerals (THERA-VIT-M) tablet    nicotine (NICODERM CQ) 21 MG/24HR 24 hr patch    senna-docusate (SENOKOT-S/PERICOLACE) 8.6-50 MG tablet    testosterone cypionate (DEPOTESTOSTERONE) 200 MG/ML injection    thiamine (B-1) 100 MG tablet    traZODone (DESYREL) 50 MG tablet    triamcinolone (ARISTOCORT HP) 0.5 % " "external cream    venlafaxine (EFFEXOR) 37.5 MG tablet     No current facility-administered medications for this encounter.     Taking meds as prescribed? Yes  Medication side effects or concerns: None reported  Outside medical appointments this week (list provider and reason for visit): Patient reported \"addiction med.\"      Dimension 3: Emotional/Behavioral Conditions & Complications -   Mental health diagnosis: Patient reports a formal diagnosis of depression and anxiety.     Date of last SIB: N/A  Date of  last SI: N/A  Date of last HI: N/A  Behavioral Targets:  Stabilize and maintain mental health.  Current MH Assignments: Complete Overcoming Negative Thinking assignment.    PHQ2:       1/29/2024    10:00 AM 1/24/2024     5:00 PM   PHQ-2 ( 1999 [employer])   Q1: Little interest or pleasure in doing things 2 1   Q2: Feeling down, depressed or hopeless 2 1   PHQ-2 Score 4 2      GAD2:       1/24/2024     5:00 PM 1/29/2024    10:00 AM   CINTHIA-2   Feeling nervous, anxious, or on edge 1 1   Not being able to stop or control worrying 1 1   CINTHIA-2 Total Score 2 2       Additional Narrative:  Current Mental Health symptoms include: Patient reports \"anxiety and nervousness.\" The patient reported that their mood has significantly changed this past week because of \"this weekend, anxiety, stress, and tension.\" The patient reported that their stress level has gone up this past week because of \"family, personal property, issues outside of treatment, and my overall behavior at [facility].\" The patient reported that the coping skills they used to manage their stress and difficult emotions were \"talking to peers, exercising, and reading.\"    Dimension 4: Treatment Acceptance / Resistance -   JACKI Diagnosis: Alcohol Use Disorder, Severe F10.20-(303.90)   Commitment to tx process/Stage of change- The patient appears to be in the contemplation stage of change.  JACKI assignments - Complete First Step and Drug Use History.  Additional " "Narrative - The patient reported that their main motivation to stay sober and in treatment this past week was \"I want a better life. I don't want to die. I want a better more fulfilling life.\".\"       Dimension 5: Relapse / Continued Problem Potential -   Relapses this week - None  Urges to use - None  UA results - The patients last UA results on 1/27/24 were all negative except for BZO.  Identified triggers - Yes, patient reported \"family, personal property, issues outside of treatment, and my overall behavior at [facility]. Addictive behavior.\"  Coping skills identified - Patient reports \"just being here in a safe place and social atmosphere.\" Patient is able to utilize these skills when needed.    Additional Narrative- Patient reports having cravings this past week. Pt reports craving 6/10, ten being high. They reported experiencing any triggers to use, \".family, personal property, issues outside of treatment, and my overall behavior at [facility].\" They identified the following coping skill(s): \"talking to peers, exercising, and reading.\" Patient participated in the spirituality group, facilitated by Adwoa Beavers and  counseling staff was present during group. They participated in weekend workshop on relapse prevention and completed all activities.     Dimension 6: Recovery Environment -   Family Involvement -   Community support group attendance - Patient has been attending nightly 12-step meetings/lectures while at .  Recreational activities - Patient reported \"ping pong walking stairs, working out, and board games.\"  Peer Relationships - Patient reported \"yes\" when asked of had gotten along with staff and peers.    Additional Narrative - Narrative - Patient has been spending time with same gender-peers and connecting with/building a sober support network. Patient reports their aftercare plan will include \"not sure.\" They participated in weekend workshop on relationships and completed all " activities.     Progress made on transition planning goals: None reported.    Justification for Continued Treatment at this Level of Care:  Risk ratings indicate continued need for this level of care. Patient has been unable to maintain abstinence from alcohol while at the outpatient level of care, lacks long-term sober maintenance skills, lacks sober coping skills and has mental health concerns which are exacerbated by substance use.  Treatment coordination activities this week: None reported.  Need for peer recovery support referral? Yes, recovery community, and therapy.    Discharge Planning:  Target Discharge Date/Timeframe: TBD   Med Mgmt Provider/Appt: TBD   Ind therapy Provider/Appt: TBD   Other referrals: TBD        Dimension Scale Review     Prior ratings: Dim1 - 0 DIM2 - 0 DIM3 - 2 DIM4 - 2 DIM5 - 4 DIM6 -4     Current ratings: Dim1 - 0 DIM2 - 0 DIM3 - 2 DIM4 - 2 DIM5 - 4 DIM6 -4       If client is 18 or older, has vulnerable adult status change? No    Are Treatment Plan goals/objectives effective? Yes  *If no, list changes to treatment plan:    Are the current goals meeting client's needs? Yes  *If no, list the changes to treatment plan.      *Client agrees with any changes to the treatment plan: N/A  *Client received copy of changes: N/A  *Client is aware of right to access a treatment plan review: Yes    ROWAN Ann

## 2024-01-30 ENCOUNTER — HOSPITAL ENCOUNTER (OUTPATIENT)
Dept: BEHAVIORAL HEALTH | Facility: CLINIC | Age: 49
Discharge: HOME | End: 2024-01-30
Attending: FAMILY MEDICINE
Payer: MEDICAID

## 2024-01-30 PROCEDURE — H2035 A/D TX PROGRAM, PER HOUR: HCPCS | Mod: HQ

## 2024-01-30 PROCEDURE — 1002N00001 HC LODGING PLUS FACILITY CHARGE ADULT

## 2024-01-30 NOTE — GROUP NOTE
Group Therapy Documentation    PATIENT'S NAME: Jose Alberto Cuellar  MRN:   [id number]  :   1975  ACCT. NUMBER: [id number]  DATE OF SERVICE: 24  START TIME: 12:30 PM  END TIME:  2:30 PM  FACILITATOR(S): Pebbles Herrera LADC  TOPIC: BEH Group Therapy  Number of patients attending the group:  9    Group Length:  2 Hours    Group Therapy Type: Recovery strategies, Emotion processing, and Daily living/independence skills    Summary of Group / Topics Discussed:    Recovery Principles, Sober coping skills, Relationship/socialization, and Relapse prevention      Group Attendance:  Attended group session    Patient's response to the group topic/interactions:  cooperative with task    Patient appeared to be Actively participating, Attentive, and Engaged.        Client specific details:  Patient attended roup session and was attentive and participative.

## 2024-01-30 NOTE — GROUP NOTE
Group Therapy Documentation    PATIENT'S NAME: Jose Alberto Cuellar  MRN:   [id number]  :   1975  ACCT. NUMBER: [id number]  DATE OF SERVICE: 24  START TIME:  3:00 PM  END TIME:  4:00 PM  FACILITATOR(S): Tammy Leroy LADC; Veronica Castellano LADC; Pebbles Herrera LADC  TOPIC: BEH Group Therapy  Number of patients attending the group:  27  Group Length:  2 Hours    Group Therapy Type: Recovery strategies    Summary of Group / Topics Discussed:    Recovery Principles and Sober coping skills    Patients were provided a 60 minute lecture on healthy connections and sober coping skills. Patients and facilitator interacted throughout presentation. Patients were offered a Q & A.       Group Attendance:  Attended group session    Patient's response to the group topic/interactions:  cooperative with task    Patient appeared to be Actively participating.        Client specific details:  Jose Alberto attended afternoon skills group and participated in processing discussion. Patient remained engaged and participated throughout group session.       ROWAN Philip  .

## 2024-01-30 NOTE — GROUP NOTE
Group Therapy Documentation    PATIENT'S NAME: Jose Alberto Cuellar  MRN:   [id number]  :   1975  ACCT. NUMBER: [id number]  DATE OF SERVICE: 24  START TIME:  9:00 AM  END TIME: 11:00 AM  FACILITATOR(S): Randi Lord LADC  TOPIC: BEH Group Therapy  Number of patients attending the group:  10  Group Length:  2 Hours    Group Therapy Type: Emotion processing    Summary of Group / Topics Discussed:    Sober coping skills and Disease of addiction      Group Attendance:  Attended group session    Patient's response to the group topic/interactions:  cooperative with task    Patient appeared to be Actively participating, Attentive, and Engaged.        Client specific details: Jose Alberto was an active participant in morning group therapy session and discussion on aftercare planning.

## 2024-01-31 ENCOUNTER — HOSPITAL ENCOUNTER (OUTPATIENT)
Dept: BEHAVIORAL HEALTH | Facility: CLINIC | Age: 49
Discharge: HOME | End: 2024-01-31
Attending: FAMILY MEDICINE
Payer: MEDICAID

## 2024-01-31 PROCEDURE — H2035 A/D TX PROGRAM, PER HOUR: HCPCS | Mod: HQ

## 2024-01-31 PROCEDURE — 1002N00001 HC LODGING PLUS FACILITY CHARGE ADULT

## 2024-01-31 NOTE — GROUP NOTE
Group Therapy Documentation    PATIENT'S NAME: Jose Alberto Cuellar  MRN:   [id number]  :   1975  ACCT. NUMBER: [id number]  DATE OF SERVICE: 24  START TIME: 12:30 PM  END TIME:  2:30 PM  FACILITATOR(S): Veronica Castellano LADC  TOPIC: BEH Group Therapy  Number of patients attending the group:  9  Group Length:  2 Hours    Group Therapy Type: Recovery strategies    Summary of Group / Topics Discussed:    Sober coping skills, Coping/DBT informed care, and Relapse prevention    Group Attendance:  Excused from group session    Patient's response to the group topic/interactions:   excused    Client specific details: Jose Alberto was excused from afternoon group for an appt.

## 2024-01-31 NOTE — GROUP NOTE
Group Therapy Documentation    PATIENT'S NAME: Jose Alberto Cuellar  MRN:   [id number]  :   1975  ACCT. NUMBER: [id number]  DATE OF SERVICE: 24  START TIME:  9:45 AM  END TIME: 11:30 AM  FACILITATOR(S): Randi Lord LADC  TOPIC: BEH Group Therapy  Number of patients attending the group:  10  Group Length:  2 Hours    Group Therapy Type: Emotion processing    Summary of Group / Topics Discussed:    Disease of addiction and Emotions/expression      Group Attendance:  Attended group session    Patient's response to the group topic/interactions:  cooperative with task    Patient appeared to be Actively participating, Attentive, and Engaged.        Client specific details: Jose Alberto was an active participant in morning group therapy session.

## 2024-01-31 NOTE — GROUP NOTE
Group Therapy Documentation    PATIENT'S NAME: Jose Alberto Cuellar  MRN:   [id number]  :   1975  ACCT. NUMBER: [id number]  DATE OF SERVICE: 24  START TIME:  8:30 AM  END TIME:  9:30 AM  FACILITATOR(S): Constantine Malloy LADC  TOPIC: BEH Group Therapy  Number of patients attending the group:  1  Group Length:  1 Hours    Group Therapy Type: Recovery strategies    Summary of Group / Topics Discussed:    Recovery Principles      Group Attendance:  Attended group session    Patient's response to the group topic/interactions:  cooperative with task    Patient appeared to be Actively participating.        Client specific details:  Jose Alberto participated and interacted appropriately with peers and staff in skills group. No triggers to use noted or discussed.

## 2024-02-01 ENCOUNTER — HOSPITAL ENCOUNTER (OUTPATIENT)
Dept: BEHAVIORAL HEALTH | Facility: CLINIC | Age: 49
Discharge: HOME | End: 2024-02-01
Attending: FAMILY MEDICINE
Payer: MEDICAID

## 2024-02-01 ENCOUNTER — LAB (OUTPATIENT)
Dept: LAB | Facility: CLINIC | Age: 49
End: 2024-02-01
Payer: MEDICAID

## 2024-02-01 DIAGNOSIS — R74.01 TRANSAMINITIS: ICD-10-CM

## 2024-02-01 DIAGNOSIS — D69.6 THROMBOCYTOPENIA (H): ICD-10-CM

## 2024-02-01 LAB
ALBUMIN SERPL BCG-MCNC: 4.5 G/DL (ref 3.5–5.2)
ALP SERPL-CCNC: 65 U/L (ref 40–150)
ALT SERPL W P-5'-P-CCNC: 48 U/L (ref 0–70)
ANION GAP SERPL CALCULATED.3IONS-SCNC: 13 MMOL/L (ref 7–15)
AST SERPL W P-5'-P-CCNC: 41 U/L (ref 0–45)
BASOPHILS # BLD AUTO: 0 10E3/UL (ref 0–0.2)
BASOPHILS NFR BLD AUTO: 1 %
BILIRUB SERPL-MCNC: 0.2 MG/DL
BUN SERPL-MCNC: 21.3 MG/DL (ref 6–20)
CALCIUM SERPL-MCNC: 9.4 MG/DL (ref 8.6–10)
CHLORIDE SERPL-SCNC: 98 MMOL/L (ref 98–107)
CREAT SERPL-MCNC: 1.07 MG/DL (ref 0.67–1.17)
DEPRECATED HCO3 PLAS-SCNC: 25 MMOL/L (ref 22–29)
EGFRCR SERPLBLD CKD-EPI 2021: 86 ML/MIN/1.73M2
EOSINOPHIL # BLD AUTO: 0.1 10E3/UL (ref 0–0.7)
EOSINOPHIL NFR BLD AUTO: 1 %
ERYTHROCYTE [DISTWIDTH] IN BLOOD BY AUTOMATED COUNT: 13.2 % (ref 10–15)
GLUCOSE SERPL-MCNC: 124 MG/DL (ref 70–99)
HCT VFR BLD AUTO: 42.5 % (ref 40–53)
HGB BLD-MCNC: 14.2 G/DL (ref 13.3–17.7)
IMM GRANULOCYTES # BLD: 0 10E3/UL
IMM GRANULOCYTES NFR BLD: 1 %
LYMPHOCYTES # BLD AUTO: 0.8 10E3/UL (ref 0.8–5.3)
LYMPHOCYTES NFR BLD AUTO: 12 %
MCH RBC QN AUTO: 32.8 PG (ref 26.5–33)
MCHC RBC AUTO-ENTMCNC: 33.4 G/DL (ref 31.5–36.5)
MCV RBC AUTO: 98 FL (ref 78–100)
MONOCYTES # BLD AUTO: 0.9 10E3/UL (ref 0–1.3)
MONOCYTES NFR BLD AUTO: 14 %
NEUTROPHILS # BLD AUTO: 4.5 10E3/UL (ref 1.6–8.3)
NEUTROPHILS NFR BLD AUTO: 71 %
NRBC # BLD AUTO: 0 10E3/UL
NRBC BLD AUTO-RTO: 0 /100
PLATELET # BLD AUTO: 159 10E3/UL (ref 150–450)
POTASSIUM SERPL-SCNC: 4.2 MMOL/L (ref 3.4–5.3)
PROT SERPL-MCNC: 7.2 G/DL (ref 6.4–8.3)
RBC # BLD AUTO: 4.33 10E6/UL (ref 4.4–5.9)
SARS-COV-2 RNA RESP QL NAA+PROBE: NEGATIVE
SODIUM SERPL-SCNC: 136 MMOL/L (ref 135–145)
WBC # BLD AUTO: 6.2 10E3/UL (ref 4–11)

## 2024-02-01 PROCEDURE — 1002N00001 HC LODGING PLUS FACILITY CHARGE ADULT

## 2024-02-01 PROCEDURE — 36415 COLL VENOUS BLD VENIPUNCTURE: CPT

## 2024-02-01 PROCEDURE — H2035 A/D TX PROGRAM, PER HOUR: HCPCS | Mod: HQ

## 2024-02-01 PROCEDURE — 85025 COMPLETE CBC W/AUTO DIFF WBC: CPT

## 2024-02-01 PROCEDURE — 80053 COMPREHEN METABOLIC PANEL: CPT

## 2024-02-01 PROCEDURE — 87635 SARS-COV-2 COVID-19 AMP PRB: CPT | Performed by: FAMILY MEDICINE

## 2024-02-01 NOTE — GROUP NOTE
Psychoeducation Group Documentation    PATIENT'S NAME: Jose Albreto Cuellar  MRN:   [id number]  :   1975  ACCT. NUMBER: [id number]  DATE OF SERVICE: 24  START TIME:  2:30 PM  END TIME:  3:30 PM  FACILITATOR(S): Shawnee Wyatt LADC; Reggie Cowan LADC  TOPIC: BEH Pyschoeducation  Number of patients attending the group:  10  Group Length:  1 Hours    Skills Group Therapy Type: Recovery skills    Summary of Group / Topics Discussed:    Relationship/social skills and Balanced lifestyle skills        Group Attendance:  Attended group session    Patient's response to the group topic/interactions:  listened actively    Patient appeared to be Attentive.         Client specific details:  Pt was attentive during group.

## 2024-02-01 NOTE — GROUP NOTE
Group Therapy Documentation    PATIENT'S NAME: Jose Alberto Cuellar  MRN:   [id number]  :   1975  ACCT. NUMBER: [id number]  DATE OF SERVICE: 24  START TIME: 12:30 PM  END TIME:  2:30 PM  FACILITATOR(S): Stewart Arechiga LADC; Adwoa Armenta  TOPIC: BEH Group Therapy  Number of patients attending the group:  10  Group Length:  2 Hours    Group Therapy Type: Recovery strategies and Emotion processing    Summary of Group / Topics Discussed:    Spiritual Care      Group Attendance:  Attended group session    Patient's response to the group topic/interactions:  cooperative with task, discussed personal experience with topic, and listened actively    Patient appeared to be Actively participating, Attentive, and Engaged.        Client specific details:  Jose Alberto gave appropriate feedback. .

## 2024-02-01 NOTE — GROUP NOTE
Group Therapy Documentation    PATIENT'S NAME: Jose Alberto Cuellar  MRN:   [id number]  :   1975  ACCT. NUMBER: [id number]  DATE OF SERVICE: 24  START TIME:  9:00 AM  END TIME: 11:00 AM  FACILITATOR(S): Stewart Arechiga LADC  TOPIC: BEH Group Therapy  Number of patients attending the group:  10  Group Length:  2 Hours    Group Therapy Type: Recovery strategies and Emotion processing    Summary of Group / Topics Discussed:    Recovery Principles, Sober coping skills, Disease of addiction, Emotions/expression, Relapse prevention, and Self-care activities      Group Attendance:  Attended group session    Patient's response to the group topic/interactions:  cooperative with task, discussed personal experience with topic, expressed readiness to alter behaviors, expressed understanding of topic, gave appropriate feedback to peers, and listened actively    Patient appeared to be Actively participating, Attentive, and Engaged.        Client specific details:  Jose Alberto gave appropriate feedback. .

## 2024-02-02 ENCOUNTER — HOSPITAL ENCOUNTER (OUTPATIENT)
Dept: BEHAVIORAL HEALTH | Facility: CLINIC | Age: 49
Discharge: HOME | End: 2024-02-02
Attending: FAMILY MEDICINE
Payer: MEDICAID

## 2024-02-02 LAB — SARS-COV-2 RNA RESP QL NAA+PROBE: NEGATIVE

## 2024-02-02 PROCEDURE — 1002N00001 HC LODGING PLUS FACILITY CHARGE ADULT

## 2024-02-02 PROCEDURE — 87635 SARS-COV-2 COVID-19 AMP PRB: CPT | Performed by: FAMILY MEDICINE

## 2024-02-02 PROCEDURE — H2035 A/D TX PROGRAM, PER HOUR: HCPCS | Mod: HQ

## 2024-02-02 NOTE — GROUP NOTE
"Group Therapy Documentation    PATIENT'S NAME: Jose Alberto Cuellar  MRN:   [id number]  :   1975  ACCT. NUMBER: [id number]  DATE OF SERVICE: 24  START TIME:  9:00 AM  END TIME: 11:00 AM  FACILITATOR(S): Stewatr Arechiga LADC  TOPIC: BEH Group Therapy  Number of patients attending the group:  10  Group Length:  2 Hours    Group Therapy Type: Recovery strategies and Emotion processing    Summary of Group / Topics Discussed:    Recovery Principles, Sober coping skills, Relationship/socialization, Balanced lifestyle, Disease of addiction, Emotions/expression, and Relapse prevention      Group Attendance:  Attended group session    Patient's response to the group topic/interactions:  cooperative with task, discussed personal experience with topic, expressed readiness to alter behaviors, expressed understanding of topic, gave appropriate feedback to peers, and listened actively    Patient appeared to be Actively participating, Attentive, and Engaged.        Client specific details:  Jose Alberto gave appropriate feedback. .He presented his \"Overcoming Negative Thinking\" assignment.     "

## 2024-02-02 NOTE — GROUP NOTE
"Group Therapy Documentation    PATIENT'S NAME: Jose Alberto Cuellar  MRN:   [id number]  :   1975  ACCT. NUMBER: [id number]  DATE OF SERVICE: 24  START TIME: 12:30 PM  END TIME:  2:30 PM  FACILITATOR(S): Camilla Escalera LADC; Johnan Townsend LADC  TOPIC: BEH Group Therapy  Number of patients attending the group:  10  Group Length:  2 Hours    Group Therapy Type: Recovery strategies, Emotion processing, and Daily living/independence skills    Summary of Group / Topics Discussed:    Patients watched \"A Street Cat [employer]deep\".  Patients then discussed how they were able to connect emotionally with certain scenes and how to implement coping skills as part of their recovery. Patients also had an opportunity to process the information seen/noticed and ask questions after the movie.      Group Attendance:  Attended group session    Patient's response to the group topic/interactions:  listened actively    Patient appeared to be Non-participatory.        Client specific details:  Jose Alberto, did not participate in group discussion after the movie, but listened attentively. .    "

## 2024-02-03 ENCOUNTER — HOSPITAL ENCOUNTER (OUTPATIENT)
Dept: BEHAVIORAL HEALTH | Facility: CLINIC | Age: 49
Discharge: HOME | End: 2024-02-03
Attending: FAMILY MEDICINE
Payer: MEDICAID

## 2024-02-03 PROCEDURE — 1002N00001 HC LODGING PLUS FACILITY CHARGE ADULT

## 2024-02-03 PROCEDURE — H2035 A/D TX PROGRAM, PER HOUR: HCPCS | Mod: HQ

## 2024-02-03 NOTE — GROUP NOTE
Group Therapy Documentation    PATIENT'S NAME: Jose Alberto Cuellar  MRN:   [id number]  :   1975  ACCT. NUMBER: [id number]  DATE OF SERVICE: 24  START TIME:  9:00 AM  END TIME: 11:00 AM  FACILITATOR(S): Yung Valverde LADC; Pebbles Herrera LADC; Reggie Cowan LADC  TOPIC: BEH Group Therapy  Number of patients attending the group:  32  Group Length:  2 Hours    Group Therapy Type: Recovery strategies, Emotion processing, and Daily living/independence skills    Summary of Group / Topics Discussed:    Recovery Principles, Sober coping skills, and Disease of addiction    Group began with an alumni speaker sharing his story of recovery, followed by lecture from counseling staff. Feedback was provided by participants throughout group session.        Group Attendance:  Attended group session    Patient's response to the group topic/interactions:  cooperative with task    Patient appeared to be Actively participating, Attentive, and Engaged.        Client specific details:  Patient actively engaged in group discussion.

## 2024-02-03 NOTE — GROUP NOTE
Group Therapy Documentation    PATIENT'S NAME: Jose Alberto Cuellar  MRN:   [id number]  :   1975  ACCT. NUMBER: [id number]  DATE OF SERVICE: 24  START TIME: 12:30 PM  END TIME:  2:30 PM  FACILITATOR(S): Pebbles Herrera LADC; Yung Valverde LADC  TOPIC: BEH Group Therapy  Number of patients attending the group:  32  Group Length:  2 Hours    Group Therapy Type: Recovery strategies, Emotion processing, and Daily living/independence skills    Summary of Group / Topics Discussed:    Sober coping skills, Disease of addiction, and Relapse prevention    Group lecture/activity was presented by counseling staff. Feedback was provided by participants throughout group session.       Group Attendance:  Attended group session    Patient's response to the group topic/interactions:  cooperative with task    Patient appeared to be Actively participating, Attentive, and Engaged.        Client specific details:  Patient actively engaged in group discussion/activity.

## 2024-02-04 ENCOUNTER — HOSPITAL ENCOUNTER (OUTPATIENT)
Dept: BEHAVIORAL HEALTH | Facility: CLINIC | Age: 49
Discharge: HOME | End: 2024-02-04
Attending: FAMILY MEDICINE
Payer: MEDICAID

## 2024-02-04 PROCEDURE — 1002N00001 HC LODGING PLUS FACILITY CHARGE ADULT

## 2024-02-04 PROCEDURE — H2035 A/D TX PROGRAM, PER HOUR: HCPCS | Mod: HQ

## 2024-02-04 NOTE — GROUP NOTE
Group Therapy Documentation    PATIENT'S NAME: Jose Alberto Cuellar  MRN:   [id number]  :   1975  ACCT. NUMBER: [id number]  DATE OF SERVICE: 24  START TIME:  9:00 AM  END TIME: 11:00 AM  FACILITATOR(S): Yung Valverde LADC  TOPIC: BEH Group Therapy  Number of patients attending the group:  32  Group Length:  2 Hours    Group Therapy Type: Recovery strategies, Emotion processing, and Daily living/independence skills    Summary of Group / Topics Discussed:    Balanced lifestyle and Self-care activities    Group lecture was facilitated by nursing staff regarding diet, nutrition, eating disorders, and substance use disorders. Participants provided feedback throughout group session.       Group Attendance:  Attended group session    Patient's response to the group topic/interactions:  cooperative with task    Patient appeared to be Actively participating, Attentive, and Engaged.        Client specific details:  Patient actively engaged in group discussion.

## 2024-02-04 NOTE — GROUP NOTE
Group Therapy Documentation    PATIENT'S NAME: Jose Alberto Cuellar  MRN:   [id number]  :   1975  ACCT. NUMBER: [id number]  DATE OF SERVICE: 24  START TIME: 12:30 PM  END TIME:  1:30 PM  FACILITATOR(S): Pebbles Herrera LADC  TOPIC: BEH Group Therapy  Number of patients attending the group:  32    Group Length:  1 Hour    Group Therapy Type: Recovery strategies, Emotion processing, and Daily living/independence skills    Summary of Group / Topics Discussed:    Cognitive behavioral therapy skills, Mindfulness/Relaxation, Disease of addiction, and Emotions/expression      Group Attendance:  Attended group session    Patient's response to the group topic/interactions:  cooperative with task    Patient appeared to be Actively participating, Attentive, and Engaged.        Client specific details:  Patient attended group lecture and was attentive and participative.

## 2024-02-05 ENCOUNTER — HOSPITAL ENCOUNTER (OUTPATIENT)
Dept: BEHAVIORAL HEALTH | Facility: CLINIC | Age: 49
Discharge: HOME | End: 2024-02-05
Attending: FAMILY MEDICINE
Payer: MEDICAID

## 2024-02-05 PROCEDURE — H2035 A/D TX PROGRAM, PER HOUR: HCPCS | Mod: HQ

## 2024-02-05 PROCEDURE — 1002N00001 HC LODGING PLUS FACILITY CHARGE ADULT

## 2024-02-05 NOTE — PROGRESS NOTES
"[facility] Weekly Treatment Plan Review    Treatment plan review for the following date span:  1/28/24-2/4/24    ATTENDANCE  Patient did not have any absences during this time period (list absence dates and reason for absence).        Weekly Treatment Plan Review     Treatment Plan initiated on: 1/20/24.    Dimension1: Acute Intoxication/Withdrawal Potential -   Date of Last Use: Patient reports his last use date for alcohol as 1/8/24.   Any reports of withdrawal symptoms - No    Dimension 2: Biomedical Conditions & Complications -   Medical Concerns: Patient reported \"a little of everything.\"  Vitals:   BP Readings from Last 3 Encounters:   01/26/24 125/81   01/18/24 (!) 175/116   01/18/24 (!) 171/112     Pulse Readings from Last 3 Encounters:   01/26/24 63   01/18/24 88   01/18/24 79     Wt Readings from Last 3 Encounters:   01/26/24 104.4 kg (230 lb 1.6 oz)   01/10/24 103.8 kg (228 lb 12.8 oz)     Temp Readings from Last 3 Encounters:   01/18/24 97.7  F (36.5  C) (Oral)   01/18/24 99.1  F (37.3  C)   01/12/24 97.8  F (36.6  C) (Oral)      Current Medications & Medication Changes:  Current Outpatient Medications   Medication    acetaminophen (TYLENOL) 325 MG tablet    alum & mag hydroxide-simethicone (MAALOX) 200-200-20 MG/5ML SUSP suspension    amLODIPine (NORVASC) 10 MG tablet    benzocaine-menthol (CEPACOL) 15-3.6 MG lozenge    folic acid (FOLVITE) 1 MG tablet    guaiFENesin-dextromethorphan (ROBITUSSIN DM) 100-10 MG/5ML syrup    ibuprofen (ADVIL/MOTRIN) 200 MG tablet    levothyroxine (SYNTHROID/LEVOTHROID) 75 MCG tablet    loratadine (CLARITIN) 10 MG tablet    melatonin 3 MG tablet    multivitamin w/minerals (THERA-VIT-M) tablet    nicotine (NICODERM CQ) 21 MG/24HR 24 hr patch    senna-docusate (SENOKOT-S/PERICOLACE) 8.6-50 MG tablet    testosterone cypionate (DEPOTESTOSTERONE) 200 MG/ML injection    thiamine (B-1) 100 MG tablet    traZODone (DESYREL) 50 MG tablet    triamcinolone (ARISTOCORT HP) 0.5 % " "external cream    venlafaxine (EFFEXOR) 37.5 MG tablet     No current facility-administered medications for this encounter.     Taking meds as prescribed? Yes  Medication side effects or concerns: None reported  Outside medical appointments this week (list provider and reason for visit): Patient reported \"addiction med.\"      Dimension 3: Emotional/Behavioral Conditions & Complications -   Mental health diagnosis: Patient reports a formal diagnosis of depression and anxiety.     Date of last SIB: N/A  Date of  last SI: N/A  Date of last HI: N/A  Behavioral Targets:  Stabilize and maintain mental health.  Current MH Assignments: Complete Overcoming Negative Thinking assignment.    PHQ2:       2/5/2024     8:00 AM 1/29/2024    10:00 AM 1/24/2024     5:00 PM   PHQ-2 ( 1999 [employer])   Q1: Little interest or pleasure in doing things 1 2 1   Q2: Feeling down, depressed or hopeless 1 2 1   PHQ-2 Score 2 4 2      GAD2:       1/24/2024     5:00 PM 1/29/2024    10:00 AM 2/5/2024     8:00 AM   CINTHIA-2   Feeling nervous, anxious, or on edge 1 1 1   Not being able to stop or control worrying 1 1 1   CINTHIA-2 Total Score 2 2 2       Additional Narrative:  Current Mental Health symptoms include: Patient reports his anxiety and stress has gone down this week. The patient reported that their mood has significantly changed this past week because of \"feeling better about my self\"  The patient reported that their stress level has gone down this past week  The patient reported that the coping skills they used to manage their stress and difficult emotions were \"talking to peers, exercising, and reading.\"Pt.continues to gain insight into how his addiction has affected his mental health.     Dimension 4: Treatment Acceptance / Resistance -   JACKI Diagnosis: Alcohol Use Disorder, Severe F10.20-(303.90)   Commitment to tx process/Stage of change- The patient appears to be in the contemplation stage of change.  JACKI assignments - Complete First Step " "and Drug Use History.  Additional Narrative - The patient reported that their main motivation to stay sober and in treatment this past week was \"I want a better life. I don't want to die. I want a better more fulfilling life.\".\" Pt.has been attending all lectures and groups. He continues to work on increasing his internal motivation to change his life style to maintain sobriety.       Dimension 5: Relapse / Continued Problem Potential -   Relapses this week - None  Urges to use - None  UA results - The patients last UA results on 1/27/24 were all negative except for BZO.  Identified triggers - Yes, patient reported \"family, personal property, issues outside of treatment, and my overall behavior at [facility]. Addictive behavior.\"  Coping skills identified - Patient reports \"just being here in a safe place and social atmosphere.\" Patient is able to utilize these skills when needed.    Additional Narrative- Patient reports having cravings this past week. Pt reports craving 7/10, ten being high. They reported experiencing any triggers to use, \".family, , issues outside of treatment, and my overall behavior at [facility].\" They identified the following coping skill(s): \"talking to peers, exercising, and reading.\" Patient participated in the spirituality group, facilitated by Adwoa Beavers and  counseling staff was present during group. They participated in weekend workshop on relapse prevention and completed all activities. Pt.attended the relapse prevention workshop. He continues to,identify his relapse warning signs and triggers.     Dimension 6: Recovery Environment -   Family Involvement -   Community support group attendance - Patient has been attending nightly 12-step meetings/lectures while at .  Recreational activities - Patient reported \"ping pong walking stairs, working out, and board games.\"  Peer Relationships - Patient reported \"yes\" when asked of had gotten along with staff and " "peers.    Additional Narrative - Narrative - Patient has been spending time with same gender-peers and connecting with/building a sober support network. Patient reports their aftercare plan will include \"not sure.\" They participated in weekend workshop on relationships and completed all activities.     Progress made on transition planning goals: None reported.    Justification for Continued Treatment at this Level of Care:  Risk ratings indicate continued need for this level of care. Patient has been unable to maintain abstinence from alcohol while at the outpatient level of care, lacks long-term sober maintenance skills, lacks sober coping skills and has mental health concerns which are exacerbated by substance use.  Treatment coordination activities this week: None reported.  Need for peer recovery support referral? Yes, recovery community, and therapy.    Discharge Planning:  Target Discharge Date/Timeframe: TBD   Med Mgmt Provider/Appt: KEMI   Ind therapy Provider/Appt: KEMI   Other referrals: TBD        Dimension Scale Review     Prior ratings: Dim1 - 0 DIM2 - 0 DIM3 - 2 DIM4 - 2 DIM5 - 4 DIM6 -4     Current ratings: Dim1 - 0 DIM2 - 0 DIM3 - 2 DIM4 - 2 DIM5 - 4 DIM6 -4       If client is 18 or older, has vulnerable adult status change? No    Are Treatment Plan goals/objectives effective? Yes  *If no, list changes to treatment plan:    Are the current goals meeting client's needs? Yes  *If no, list the changes to treatment plan.      *Client agrees with any changes to the treatment plan: N/A  *Client received copy of changes: N/A  *Client is aware of right to access a treatment plan review: Yes    ROWAN Kate   "

## 2024-02-05 NOTE — GROUP NOTE
Group Therapy Documentation    PATIENT'S NAME: Jose Alberto Cuellar  MRN:   [id number]  :   1975  ACCT. NUMBER: [id number]  DATE OF SERVICE: 24  START TIME:  9:00 AM  END TIME: 11:00 AM  FACILITATOR(S): Stewart Arechiga LADC  TOPIC: BEH Group Therapy  Number of patients attending the group:  10  Group Length:  2 Hours    Group Therapy Type: Recovery strategies and Emotion processing    Summary of Group / Topics Discussed:    Recovery Principles, Sober coping skills, Balanced lifestyle, Disease of addiction, Emotions/expression, Relapse prevention, and Self-care activities      Group Attendance:  Attended group session    Patient's response to the group topic/interactions:  cooperative with task, discussed personal experience with topic, expressed readiness to alter behaviors, expressed understanding of topic, gave appropriate feedback to peers, and listened actively    Patient appeared to be Actively participating, Attentive, and Engaged.        Client specific details:  Jose Alberto gave appropriate feedback. .

## 2024-02-05 NOTE — GROUP NOTE
Group Therapy Documentation    PATIENT'S NAME: Jose Alberto Cuellar  MRN:   [id number]  :   1975  ACCT. NUMBER: [id number]  DATE OF SERVICE: 24  START TIME: 12:30 PM  END TIME:  2:30 PM  FACILITATOR(S): Randi Lord LADC  TOPIC: BEH Group Therapy  Number of patients attending the group:  10  Group Length:  2 Hours    Group Therapy Type: Emotion processing    Summary of Group / Topics Discussed:    Sober coping skills and Disease of addiction      Group Attendance:  Attended group session    Patient's response to the group topic/interactions:  cooperative with task    Patient appeared to be Actively participating, Attentive, and Engaged.        Client specific details: Jose Alberto was an active participant in group therapy session. He participated in a goal-setting exercise and offered feedback to a peer who shared his drug use history assignment.

## 2024-02-06 ENCOUNTER — HOSPITAL ENCOUNTER (OUTPATIENT)
Dept: BEHAVIORAL HEALTH | Facility: CLINIC | Age: 49
Discharge: HOME | End: 2024-02-06
Attending: FAMILY MEDICINE
Payer: MEDICAID

## 2024-02-06 PROCEDURE — H2035 A/D TX PROGRAM, PER HOUR: HCPCS | Mod: HQ

## 2024-02-06 PROCEDURE — 1002N00001 HC LODGING PLUS FACILITY CHARGE ADULT

## 2024-02-06 NOTE — GROUP NOTE
Group Therapy Documentation    PATIENT'S NAME: Jose Alberto Cuellar  MRN:   [id number]  :   1975  ACCT. NUMBER: [id number]  DATE OF SERVICE: 24  START TIME:  3:00 PM  END TIME:  4:00 PM  FACILITATOR(S): Tammy Leroy LADC; Johann Townsend LADC; Pebbles Herrera LADC  TOPIC: BEH Group Therapy  Number of patients attending the group:  27  Group Length:  1 Hours    Group Therapy Type: Recovery strategies    Summary of Group / Topics Discussed:    Recovery Principles    Guest Lecturer Dr. Luque presented a 60 minute presentation on Addiction Research. Exploring ( What is research? What's the point of research in the Addiction Field? What are the benefits? How long does it take? What is the overall goal of Addiction Study Research.) Patients were allowed to asked questions and process the presentation with peers and staff.         Group Attendance:  Attended group session    Patient's response to the group topic/interactions:  cooperative with task    Patient appeared to be Actively participating.        Client specific details:  Jose Alberto attended afternoon skills group and participated in processing discussion. Patient remained engaged and participated throughout group session.       ROWAN Philip

## 2024-02-06 NOTE — GROUP NOTE
Group Therapy Documentation    PATIENT'S NAME: Jose Alberto Cuellar  MRN:   [id number]  :   1975  ACCT. NUMBER: [id number]  DATE OF SERVICE: 24  START TIME:  9:00 AM  END TIME: 11:00 AM  FACILITATOR(S): Stewart Arechiga LADC  TOPIC: BEH Group Therapy  Number of patients attending the group:  8  Group Length:  2 Hours    Group Therapy Type: Recovery strategies and Emotion processing    Summary of Group / Topics Discussed:    Recovery Principles, Sober coping skills, Relationship/socialization, Balanced lifestyle, Disease of addiction, Emotions/expression, Relapse prevention, and Self-care activities      Group Attendance:  Attended group session    Patient's response to the group topic/interactions:  cooperative with task, discussed personal experience with topic, expressed readiness to alter behaviors, expressed understanding of topic, gave appropriate feedback to peers, and listened actively    Patient appeared to be Actively participating, Attentive, and Engaged.        Client specific details:  Jose Alberto gave appropriate feedback. .

## 2024-02-06 NOTE — GROUP NOTE
Group Therapy Documentation    PATIENT'S NAME: Jose Alberto Cuellar  MRN:   [id number]  :   1975  ACCT. NUMBER: [id number]  DATE OF SERVICE: 24  START TIME: 12:30 PM  END TIME:  2:30 PM  FACILITATOR(S): Johann Townsend LADC  TOPIC: BEH Group Therapy  Number of patients attending the group:  8  Group Length:  2 Hours    Group Therapy Type: Recovery strategies    Summary of Group / Topics Discussed:    Recovery Principles, Relationship/socialization, Balanced lifestyle, Disease of addiction, Relapse prevention, and Self-care activities      Group Attendance:  Attended group session    Patient's response to the group topic/interactions:  cooperative with task    Patient appeared to be Attentive and Engaged.        Client specific details:  Jose Alberto participated in afternoon group. He took part in a recovery activity and discussion.

## 2024-02-07 ENCOUNTER — HOSPITAL ENCOUNTER (OUTPATIENT)
Dept: BEHAVIORAL HEALTH | Facility: CLINIC | Age: 49
Discharge: HOME | End: 2024-02-07
Attending: FAMILY MEDICINE
Payer: MEDICAID

## 2024-02-07 PROCEDURE — H2035 A/D TX PROGRAM, PER HOUR: HCPCS | Mod: HQ

## 2024-02-07 PROCEDURE — 1002N00001 HC LODGING PLUS FACILITY CHARGE ADULT

## 2024-02-07 NOTE — GROUP NOTE
Group Therapy Documentation    PATIENT'S NAME: Jose Alberto Cuellar  MRN:   [id number]  :   1975  ACCT. NUMBER: [id number]  DATE OF SERVICE: 24  START TIME: 12:30 PM  END TIME:  2:30 PM  FACILITATOR(S): Randi Lord LADC  TOPIC: BEH Group Therapy  Number of patients attending the group:  9  Group Length:  2 Hours    Group Therapy Type: Emotion processing    Summary of Group / Topics Discussed:    Disease of addiction, Emotions/expression, and Relapse prevention      Group Attendance:  Attended group session    Patient's response to the group topic/interactions:  cooperative with task    Patient appeared to be Actively participating, Attentive, and Engaged.        Client specific details: Jose Alberto was an active participant in afternoon group therapy session.

## 2024-02-07 NOTE — GROUP NOTE
Psychoeducation Group Documentation    PATIENT'S NAME: Jose Alberto Cuellar  MRN:   [id number]  :   1975  ACCT. NUMBER: [id number]  DATE OF SERVICE: 24  START TIME:  8:30 AM  END TIME:  9:30 AM  FACILITATOR(S): Stewart Arechiga LADC; Constantine Malloy LADC; Shawnee Wyatt LADC  TOPIC: BEH Pyschoeducation  Number of patients attending the group:  30  Group Length:  1 Hours    Skills Group Therapy Type: Recovery skills and Healthy behaviors development    Summary of Group / Topics Discussed:    Relationship/social skills and Balanced lifestyle skills        Group Attendance:  Attended group session    Patient's response to the group topic/interactions:  cooperative with task and listened actively    Patient appeared to be Attentive.         Client specific details:  Jose Alberto gave appropriate feedback. .

## 2024-02-07 NOTE — GROUP NOTE
Group Therapy Documentation    PATIENT'S NAME: Jose Alberto Cuellar  MRN:   [id number]  :   1975  ACCT. NUMBER: [id number]  DATE OF SERVICE: 24  START TIME:  9:40 AM  END TIME: 11:20 AM  FACILITATOR(S): Stewart Arechiga LADC  TOPIC: BEH Group Therapy  Number of patients attending the group:  9  Group Length:  2 Hours    Group Therapy Type: Recovery strategies and Emotion processing    Summary of Group / Topics Discussed:    Recovery Principles, Spiritual Care, Sober coping skills, Balanced lifestyle, Trauma informed care, Disease of addiction, Emotions/expression, and Relapse prevention      Group Attendance:  Attended group session    Patient's response to the group topic/interactions:  cooperative with task, discussed personal experience with topic, expressed readiness to alter behaviors, expressed understanding of topic, gave appropriate feedback to peers, and listened actively    Patient appeared to be Actively participating, Attentive, and Engaged.        Client specific details:  Jose Alberto gave appropriate feedback. .

## 2024-02-08 ENCOUNTER — HOSPITAL ENCOUNTER (OUTPATIENT)
Dept: BEHAVIORAL HEALTH | Facility: CLINIC | Age: 49
Discharge: HOME | End: 2024-02-08
Attending: FAMILY MEDICINE
Payer: MEDICAID

## 2024-02-08 PROCEDURE — 1002N00001 HC LODGING PLUS FACILITY CHARGE ADULT

## 2024-02-08 PROCEDURE — H2035 A/D TX PROGRAM, PER HOUR: HCPCS | Mod: HQ

## 2024-02-08 NOTE — GROUP NOTE
Group Therapy Documentation    PATIENT'S NAME: Jose Alberto Cuellar  MRN:   [id number]  :   1975  ACCT. NUMBER: [id number]  DATE OF SERVICE: 24  START TIME:  9:00 AM  END TIME: 11:00 AM  FACILITATOR(S): Stewart Arechiga LADC  TOPIC: BEH Group Therapy  Number of patients attending the group:  10  Group Length:  2 Hours    Group Therapy Type: Recovery strategies and Emotion processing    Summary of Group / Topics Discussed:    Recovery Principles, Sober coping skills, Balanced lifestyle, Disease of addiction, Emotions/expression, Relapse prevention, and Self-care activities      Group Attendance:  Attended group session    Patient's response to the group topic/interactions:  cooperative with task, expressed readiness to alter behaviors, expressed understanding of topic, gave appropriate feedback to peers, listened actively, and offered helpful suggestions to peers    Patient appeared to be Actively participating, Attentive, and Engaged.        Client specific details:  Jose Alberto gave appropriate feedback. .

## 2024-02-08 NOTE — GROUP NOTE
Psychoeducation Group Documentation    PATIENT'S NAME: Jose Alberto Cuellar  MRN:   [id number]  :   1975  ACCT. NUMBER: [id number]  DATE OF SERVICE: 24  START TIME:  3:00 PM  END TIME:  4:00 PM  FACILITATOR(S): Veronica Castellano LADC; Reggie Cowan LADC; Randi Lord LADC  TOPIC: BEH Pyschoeducation  Number of patients attending the group:  25  Group Length:  1 Hours    Skills Group Therapy Type: Recovery skills and Healthy behaviors development    Summary of Group / Topics Discussed:    Balanced lifestyle skills and Symptom management skills    Group Attendance:  Attended group session    Patient's response to the group topic/interactions:  cooperative with task    Patient appeared to be Attentive and Engaged.         Client specific details: Pt listened respectfully to Dr. Terry's presentation on neuroplasticity and the frontal lobe, and asked appropriate questions.

## 2024-02-08 NOTE — PROGRESS NOTES
Pt  was started on amlodipine 5 mg  at a recent detox admission and was increased in the ED on 1/18 to 10 mg per day . Pt reports since then he is experiencing feeling increasingly fatigue, increased sweating in only foot area and had aching in both feet.   He stopped taking amlodipine yesterday (pt also started fast to clear his mind). He reports today that all symptoms have improved  improved     B/p today when he checked it was 127/84 Pt was advised to make diet changes and med changes with the support of his PCP and to schedule an appt. Pt agreed to set up appt with his pcp to discuss med changes. He will alert RN tomorrow of appt date and time

## 2024-02-08 NOTE — GROUP NOTE
Group Therapy Documentation    PATIENT'S NAME: Jose Alberto Cuellar  MRN:   [id number]  :   1975  ACCT. NUMBER: [id number]  DATE OF SERVICE: 24  START TIME: 12:30 PM  END TIME:  2:30 PM  FACILITATOR(S): Randi Lord LADC; Adwoa Armenta  TOPIC: BEH Group Therapy  Number of patients attending the group:  8  Group Length:  2 Hours    Group Therapy Type: Emotion processing    Summary of Group / Topics Discussed:    Spiritual Care      Group Attendance:  Attended group session    Patient's response to the group topic/interactions:  cooperative with task    Patient appeared to be Actively participating, Attentive, and Engaged.        Client specific details: Jose Alberto was an active participant in spiritual care group session led by Adwoa Armenta.

## 2024-02-09 ENCOUNTER — HOSPITAL ENCOUNTER (OUTPATIENT)
Dept: BEHAVIORAL HEALTH | Facility: CLINIC | Age: 49
Discharge: HOME | End: 2024-02-09
Attending: FAMILY MEDICINE
Payer: MEDICAID

## 2024-02-09 PROCEDURE — 1002N00001 HC LODGING PLUS FACILITY CHARGE ADULT

## 2024-02-09 PROCEDURE — H2035 A/D TX PROGRAM, PER HOUR: HCPCS | Mod: HQ

## 2024-02-09 NOTE — GROUP NOTE
Group Therapy Documentation    PATIENT'S NAME: Jose Alberto Cuellar  MRN:   [id number]  :   1975  ACCT. NUMBER: [id number]  DATE OF SERVICE: 24  START TIME: 12:30 PM  END TIME:  2:30 PM  FACILITATOR(S): Pebbles Herrera LADC  TOPIC: BEH Group Therapy  Number of patients attending the group:  7    Group Length:  2 Hours    Group Therapy Type: Recovery strategies, Emotion processing, and Daily living/independence skills    Summary of Group / Topics Discussed:    Recovery Principles, Relationship/socialization, Balanced lifestyle, and Relapse prevention      Group Attendance:  Attended group session    Patient's response to the group topic/interactions:  cooperative with task    Patient appeared to be Actively participating, Attentive, and Engaged.        Client specific details:  Patient watched the movie [employer]c[place]et[employer] and had a group discussion in which he was attentive and participative..

## 2024-02-09 NOTE — GROUP NOTE
Group Therapy Documentation    PATIENT'S NAME: Jose Alberto Cuellar  MRN:   [id number]  :   1975  ACCT. NUMBER: [id number]  DATE OF SERVICE: 24  START TIME:  9:00 AM  END TIME: 11:00 AM  FACILITATOR(S): Randi Lord LADC  TOPIC: BEH Group Therapy  Number of patients attending the group:  7  Group Length:  2 Hours    Group Therapy Type: Emotion processing    Summary of Group / Topics Discussed:    Sober coping skills and Disease of addiction      Group Attendance:  Attended group session    Patient's response to the group topic/interactions:  cooperative with task    Patient appeared to be Actively participating, Attentive, and Engaged.        Client specific details: Jose Alberto was an active participant in morning group therapy session and peer graduation ceremonies. He offered supportive feedback to a peer who shared his Drug Use History assignment.

## 2024-02-10 ENCOUNTER — HOSPITAL ENCOUNTER (OUTPATIENT)
Dept: BEHAVIORAL HEALTH | Facility: CLINIC | Age: 49
Discharge: HOME | End: 2024-02-10
Attending: FAMILY MEDICINE
Payer: MEDICAID

## 2024-02-10 PROCEDURE — 1002N00001 HC LODGING PLUS FACILITY CHARGE ADULT

## 2024-02-10 PROCEDURE — H2035 A/D TX PROGRAM, PER HOUR: HCPCS | Mod: HQ

## 2024-02-10 NOTE — GROUP NOTE
Group Therapy Documentation    PATIENT'S NAME: Jose Alberto Cuellar  MRN:   [id number]  :   1975  ACCT. NUMBER: [id number]  DATE OF SERVICE: 2/10/24  START TIME: 12:30 PM  END TIME:  2:30 PM  FACILITATOR(S): Veronica Castellano LADC; Stewart Arechiga LADC; Tammy Leroy LADC  TOPIC: BEH Group Therapy  Number of patients attending the group:  24  Group Length:  2 Hours    Group Therapy Type: Recovery strategies and Daily living/independence skills    Summary of Group / Topics Discussed:    Relationship/socialization, Emotions/expression, and Relapse prevention    Group Attendance:  Attended group session    Patient's response to the group topic/interactions:  cooperative with task    Patient appeared to be Actively participating, Attentive, and Engaged.        Client specific details: Pt listened respectfully to presentations on communication types and styles, and took part in the related activities.

## 2024-02-10 NOTE — PROGRESS NOTES
Pt stopped taking Amlodipine for the last 3 days.  Pt does not want to take. Writer encouraged the pt to not stop taking blood pressure medication unless under the care of his provider.  Pt stated that he would speak with his provider about this.  Pt stated he is monitoring his blood pressures currently and stated around 140/80.    Celi Dumont RN    [facility]  Nurse Liaison / CD [facility] ([facility])  [address] [place] [address]  O:[phone]  F:[phone]  RN [place] [id number]  LP After hours(UC):[phone]  LP admin counselor:[phone]  CD assess:[phone]

## 2024-02-10 NOTE — GROUP NOTE
Psychoeducation Group Documentation    PATIENT'S NAME: Jose Alberto Cuellar  MRN:   [id number]  :   1975  ACCT. NUMBER: [id number]  DATE OF SERVICE: 2/10/24  START TIME:  9:00 AM  END TIME: 11:00 AM  FACILITATOR(S): Stewart Arechiga LADC; Veronica Castellano LADC; Tammy Leroy LADC  TOPIC: BEH Pyschoeducation  Number of patients attending the group:  26  Group Length:  2 Hours    Skills Group Therapy Type: Recovery skills    Summary of Group / Topics Discussed:    Relapse prevention skills        Group Attendance:  Attended group session    Patient's response to the group topic/interactions:  cooperative with task and listened actively    Patient appeared to be Attentive and Engaged.         Client specific details:  Jose Alberto gave appropriate feedback. .

## 2024-02-11 ENCOUNTER — HOSPITAL ENCOUNTER (OUTPATIENT)
Dept: BEHAVIORAL HEALTH | Facility: CLINIC | Age: 49
Discharge: HOME | End: 2024-02-11
Attending: FAMILY MEDICINE
Payer: MEDICAID

## 2024-02-11 PROCEDURE — H2035 A/D TX PROGRAM, PER HOUR: HCPCS | Mod: HQ

## 2024-02-11 PROCEDURE — 1002N00001 HC LODGING PLUS FACILITY CHARGE ADULT

## 2024-02-11 NOTE — GROUP NOTE
Psychoeducation Group Documentation    PATIENT'S NAME: Jose Alberto Cuellar  MRN:   [id number]  :   1975  ACCT. NUMBER: [id number]  DATE OF SERVICE: 24  START TIME:  7:36 AM  END TIME:  1:30 PM  FACILITATOR(S): Stewart Arechiga LADC; Veronica Castellano LADC  TOPIC: BEH Pyschoeducation  Number of patients attending the group:  25  Group Length:  1 Hours    Skills Group Therapy Type: Healthy behaviors development    Summary of Group / Topics Discussed:    Relationship/social skills        Group Attendance:  Attended group session    Patient's response to the group topic/interactions:  cooperative with task and listened actively    Patient appeared to be Attentive and Engaged.         Client specific details:  Jose Alberto gave appropriate feedback. .

## 2024-02-11 NOTE — GROUP NOTE
Psychoeducation Group Documentation    PATIENT'S NAME: Jose Alberto Cuellar  MRN:   [id number]  :   1975  ACCT. NUMBER: [id number]  DATE OF SERVICE: 24  START TIME:  8:40 AM  END TIME: 10:30 AM  FACILITATOR(S): Stewart Arechiga LADC; Celi Dumont RN; Veronica Castellano LADC  TOPIC: BEH Pyschoeducation  Number of patients attending the group:  25  Group Length:  2 Hours    Skills Group Therapy Type: Healthy behaviors development    Summary of Group / Topics Discussed:    Balanced lifestyle skills        Group Attendance:  Attended group session    Patient's response to the group topic/interactions:  cooperative with task and listened actively    Patient appeared to be Attentive and Engaged.         Client specific details:  Jose Alberto gave appropriate feedback. .

## 2024-02-11 NOTE — PROGRESS NOTES
"[facility] Weekly Treatment Plan Review    Date span:  24 to 24    Patient did not have any absences during this time period (list absence dates and reason for absence).     Treatment Plan initiated on: 24.    Dimension1: Acute Intoxication/Withdrawal Potential -   Previous Dimension Ratin  Current Dimension Ratin  Date of Last Use: 24  Any reports of withdrawal symptoms - No    Dimension 2: Biomedical Conditions & Complications -   Previous Dimension Ratin  Current Dimension Ratin  Medical Concerns:  \"a little of everything\"  Current Medications & Medication Changes:  Current Outpatient Medications   Medication    acetaminophen (TYLENOL) 325 MG tablet    alum & mag hydroxide-simethicone (MAALOX) 200-200-20 MG/5ML SUSP suspension    amLODIPine (NORVASC) 10 MG tablet    benzocaine-menthol (CEPACOL) 15-3.6 MG lozenge    folic acid (FOLVITE) 1 MG tablet    guaiFENesin-dextromethorphan (ROBITUSSIN DM) 100-10 MG/5ML syrup    ibuprofen (ADVIL/MOTRIN) 200 MG tablet    levothyroxine (SYNTHROID/LEVOTHROID) 75 MCG tablet    loratadine (CLARITIN) 10 MG tablet    melatonin 3 MG tablet    multivitamin w/minerals (THERA-VIT-M) tablet    nicotine (NICODERM CQ) 21 MG/24HR 24 hr patch    senna-docusate (SENOKOT-S/PERICOLACE) 8.6-50 MG tablet    testosterone cypionate (DEPOTESTOSTERONE) 200 MG/ML injection    thiamine (B-1) 100 MG tablet    traZODone (DESYREL) 50 MG tablet    triamcinolone (ARISTOCORT HP) 0.5 % external cream    venlafaxine (EFFEXOR) 37.5 MG tablet     No current facility-administered medications for this encounter.     Medication Prescriber: see chart  Taking meds as prescribed? Yes  Medication side effects or concerns:  None reported.  Outside medical appointments this week (list provider and reason for visit):  None reported.    Narrative: Pt seems fully functioning and seeks medical attention as needed. He attended lecture given by LP nurse on tb/STIs on 24. " "    Dimension 3: Emotional/Behavioral Conditions & Complications -   Previous Dimension Ratin  Current Dimension Ratin  PHQ2:       2024    10:00 AM 2024     8:00 AM 2024    10:00 AM 2024     5:00 PM   PHQ-2 (  [employer])   Q1: Little interest or pleasure in doing things 1 1 2 1   Q2: Feeling down, depressed or hopeless 1 1 2 1   PHQ-2 Score 2 2 4 2      GAD2:       2024     5:00 PM 2024    10:00 AM 2024     8:00 AM 2024    10:00 AM   CINTHIA-2   Feeling nervous, anxious, or on edge 1 1 1 1   Not being able to stop or control worrying 1 1 1 1   CINTHIA-2 Total Score 2 2 2 2     Mental health diagnosis: depression and anxiety   Date of last SIB:  Patient denies.  Date of  last SI:  Patient denies.  Date of last HI: Patient denies.  Behavioral Targets: Follow recommendations of medical provider. Report any alcohol or drug use to counselor and any increase in withdrawal symptoms to nurse. Stabilize and maintain mental health.   Risk factors: The patient lacks relapse prevention, coping, and refusal skills, as well as a sober peer support network. He has dual issues of MI and CD, a tendency to isolate, and a significant history of guilt, shame, and loss issues.  Protective factors:  positive relationships positive family connections, forward/future oriented thinking, restricted access to lethal means (firearms), abstinence from substances, adherence with prescribed medication, agreement to use safety plan, living with other people, structured day, positive social skills, and access to a variety of clinical interventions  Current MH Assignments:  none at this time    Narrative: Current Mental Health symptoms include: none reported or observed. See below for suicide risk assessment. Pt does not endorse thoughts of self-harm or suicide ideation at this time. Pt's current CGI is 4/4.  He reports that his stress level has decreased; coping skills to manage stress used were \"talking to " "peers, reading, exercise\". Pt reported that his mood has not significantly changed this past week.    [place] Suicide Severity Rating Scale (Short Version)      2024     3:19 PM 2024     1:00 PM 2024     1:32 PM   [place] Suicide Severity Rating (Short Version)   Over the past 2 weeks have you felt down, depressed, or hopeless? yes  no   Over the past 2 weeks have you had thoughts of killing yourself? yes  no   Have you ever attempted to kill yourself? no  no   Q1 Wished to be Dead (Past Month) yes yes    Q2 Suicidal Thoughts (Past Month) yes no    Q3 Suicidal Thought Method no no    Q4 Suicidal Intent without Specific Plan no no    Q5 Suicide Intent with Specific Plan no no    Q6 Suicide Behavior (Lifetime)  no    Level of Risk per Screen low risk low risk    High Risk Required Interventions On continuous in person observation     Required Interventions Provider notified     Interventions DEC consulted;Monitored via video         Dimension 4: Treatment Acceptance / Resistance -   Previous Dimension Ratin  Current Dimension Ratin  JACKI Diagnosis:  Alcohol Use Disorder   303.90 (F10.20) Severe In a controlled environment  Commitment to tx process/Stage of change- Pt consistently attends and participates in groups and lectures. He appears to be in the contemplative stage of change at this time.  JACKI assignments - \"Use History\"    Narrative - Pt reports his motivation this week is \"my health, and having a fulfilling life.\" Pt reports that he's not sure what his aftercare plans include. He reports that he has gotten along with peers and staff this week.     Dimension 5: Relapse / Continued Problem Potential -   Previous Dimension Ratin  Current Dimension Ratin  Relapses this week - None  Urges to use - YES, List see below  UA results - positive for BZO    Narrative- Pt reports craving 4/10, ten being high. He reported not experiencing any triggers to use, but used the following coping " "skill(s): \"remembering why I want to be sober.\" Pt participated in the spirituality group, facilitated by Adwoa Beavers and  counseling staff was present during group. He participated in weekend workshop on relapse prevention and completed all activities.     Dimension 6: Recovery Environment -   Previous Dimension Ratin  Current Dimension Ratin  Family Involvement - n/a  Summarize attendance at family groups and family sessions - n/a  Family supportive of treatment?  Yes  Recreational activities - ping pong, working out, watching movies with peers    Narrative - Pt is spending free time with same-gender peers and attending at least 3 virtual 12-step meetings weekly while in . He reports having had contact with his parents and sisters this past week.    Progress made on transition planning goals: see chart    Justification for Continued Treatment at this Level of Care: Risk ratings indicate continued need for this level of care. Pt has been unable to maintain abstinence from alcohol while at the outpatient level of care, lacks long-term sober maintenance skills, lacks sober coping skills and has medical and mental health concerns which are exacerbated by substance use.   Treatment coordination activities this week:   none at this time  Need for peer recovery support referral? No    Discharge Planning:  Target Discharge Date/Timeframe: 24  Med Mgmt Provider/Appt:  KEMI  Ind therapy Provider/Appt:  RUPINDERD  Family therapy Provider/Appt:  KEMI  Other referrals:  none at this time.    Has vulnerable adult status changed? No    Interdisciplinary Clinical Supervision including: LADC and RN    Are Treatment Plan goals/objectives effective? Yes  *If no, list changes to treatment plan:    Are the current goals meeting client's needs? Yes  *If no, list the changes to treatment plan.    Patient Input / Response: Pt contributed to treatment plan review.    *Client agrees with any changes to the treatment plan: " N/A  *Client received copy of changes: N/A  *Client is aware of right to access a treatment plan review: Yes    ROWAN Umanzor

## 2024-02-12 ENCOUNTER — HOSPITAL ENCOUNTER (OUTPATIENT)
Dept: BEHAVIORAL HEALTH | Facility: CLINIC | Age: 49
Discharge: HOME | End: 2024-02-12
Attending: FAMILY MEDICINE
Payer: MEDICAID

## 2024-02-12 ENCOUNTER — OFFICE VISIT (OUTPATIENT)
Dept: FAMILY MEDICINE | Facility: CLINIC | Age: 49
End: 2024-02-12
Payer: MEDICAID

## 2024-02-12 VITALS
SYSTOLIC BLOOD PRESSURE: 142 MMHG | HEIGHT: 69 IN | TEMPERATURE: 97.8 F | OXYGEN SATURATION: 98 % | BODY MASS INDEX: 33.62 KG/M2 | HEART RATE: 77 BPM | RESPIRATION RATE: 17 BRPM | WEIGHT: 227 LBS | DIASTOLIC BLOOD PRESSURE: 86 MMHG

## 2024-02-12 DIAGNOSIS — L91.8 ACQUIRED SKIN TAG: Primary | ICD-10-CM

## 2024-02-12 PROCEDURE — 11200 RMVL SKIN TAGS UP TO&INC 15: CPT | Performed by: FAMILY MEDICINE

## 2024-02-12 PROCEDURE — 1002N00001 HC LODGING PLUS FACILITY CHARGE ADULT

## 2024-02-12 PROCEDURE — H2035 A/D TX PROGRAM, PER HOUR: HCPCS | Mod: HQ

## 2024-02-12 ASSESSMENT — ENCOUNTER SYMPTOMS: EYE PAIN: 1

## 2024-02-12 ASSESSMENT — PAIN SCALES - GENERAL: PAINLEVEL: NO PAIN (0)

## 2024-02-12 NOTE — PROGRESS NOTES
"  Assessment & Plan     Acquired skin tag  On right upper eyelid, starting to obstruct vision : so big its hard to open my eye:\"   He has 1 skin tag(s) that he would like removed.         O  ASSESSMENT:   1 skin tag(s) excision.    PLAN:    Wound care instructions given. Follow up as needed.          Nicotine/Tobacco Cessation  He reports that he has been smoking cigarettes. He has never used smokeless tobacco.  Nicotine/Tobacco Cessation Plan  Information offered: Patient not interested at this time      BMI  Estimated body mass index is 33.7 kg/m  as calculated from the following:    Height as of this encounter: 1.748 m (5' 8.82\").    Weight as of this encounter: 103 kg (227 lb).             Manisha Abrams is a 49 year old, presenting for the following health issues:  Eye Problem      2/12/2024    12:51 PM   Additional Questions   Roomed by Campbell HOFFMANN     Eye Problem     History of Present Illness       Reason for visit:  I have a growth on eye lid    He eats 2-3 servings of fruits and vegetables daily.He consumes 0 sweetened beverage(s) daily.He exercises with enough effort to increase his heart rate 30 to 60 minutes per day.  He exercises with enough effort to increase his heart rate 5 days per week. He is missing 1 dose(s) of medications per week.                   Objective    BP (!) 142/86 (BP Location: Left arm, Patient Position: Sitting, Cuff Size: Adult Large)   Pulse 77   Temp 97.8  F (36.6  C) (Temporal)   Resp 17   Ht 1.748 m (5' 8.82\")   Wt 103 kg (227 lb)   SpO2 98%   BMI 33.70 kg/m    Body mass index is 33.7 kg/m .  Physical Exam   EYES:globe of  Eyes grossly normal to inspection, PERRL and conjunctivae and sclerae normal  NECK: no adenopathy, no asymmetry, masses, or scars  RESP: lungs clear to auscultation - no rales, rhonchi or wheezes  CV: regular rate and rhythm, normal S1 S2, no S3 or S4, no murmur, click or rub, no peripheral edema  ABDOMEN: soft, nontender, no hepatosplenomegaly, no " masses and bowel sounds normal  MS: no gross musculoskeletal defects noted, no edema     Exam shows a 1 4-6 mm skin tag(s), on the right middle upper eyelid. There is evidence of irritation with surrounding redness. It is 0.6cm in diameter.      Discussed with Jose Alberto regarding technique, risk of infection, and scarring. Betadine is used for cleansing. Lidocaine without epinephrine is used for anesthesia. Skin tags removed by shaved excision. Bandage applied. Jose Alberto tolerated procedure well. No complications.  Hospital Outpatient Visit on 02/02/2024   Component Date Value Ref Range Status    SARS CoV2 PCR 02/02/2024 Negative  Negative Final    NEGATIVE: SARS-CoV-2 (COVID-19) RNA not detected, presumed negative.       normal platelet last visit    Signed Electronically by: Tj Correa MD

## 2024-02-12 NOTE — GROUP NOTE
Group Therapy Documentation    PATIENT'S NAME: Jose Alberto Cuellar  MRN:   [id number]  :   1975  ACCT. NUMBER: [id number]  DATE OF SERVICE: 24  START TIME:  9:00 AM  END TIME: 11:00 AM  FACILITATOR(S): Stewart Arechiga LADC  TOPIC: BEH Group Therapy  Number of patients attending the group: 8  Group Length:  2 Hours    Group Therapy Type: Recovery strategies    Summary of Group / Topics Discussed:    Recovery Principles, Sober coping skills, Balanced lifestyle, Disease of addiction, Emotions/expression, and Relapse prevention      Group Attendance:  Attended group session    Patient's response to the group topic/interactions:  cooperative with task, discussed personal experience with topic, expressed readiness to alter behaviors, expressed understanding of topic, gave appropriate feedback to peers, listened actively, and offered helpful suggestions to peers    Patient appeared to be Actively participating, Attentive, and Engaged.        Client specific details:  Jose Alberto gave appropriate feedback. .He presented his drug use history assignment.

## 2024-02-12 NOTE — GROUP NOTE
Group Therapy Documentation    PATIENT'S NAME: Jose Alberto Cuellar  MRN:   [id number]  :   1975  ACCT. NUMBER: [id number]  DATE OF SERVICE: 24  START TIME: 12:30 PM  END TIME:  2:30 PM  FACILITATOR(S): Randi Lord LADC  TOPIC: BEH Group Therapy  Number of patients attending the group:  8  Group Length:  2 Hours    Group Therapy Type: Emotion processing    Summary of Group / Topics Discussed:    Disease of addiction and Relapse prevention      Group Attendance:  Attended group session    Patient's response to the group topic/interactions:  cooperative with task    Patient appeared to be Actively participating, Attentive, and Engaged.        Client specific details: Jose Alberto was an active participant in afternoon group therapy session. He participated in a goal-setting exercise and offered supportive feedback to his peers who shared treatment plan assignments. He was excused for one hour to attend a medical appointment.

## 2024-02-12 NOTE — ADDENDUM NOTE
Enc[facility] on: 2/12/2024 10:02 AM   Actions taken: Charge Capture section accepted

## 2024-02-13 ENCOUNTER — HOSPITAL ENCOUNTER (OUTPATIENT)
Dept: BEHAVIORAL HEALTH | Facility: CLINIC | Age: 49
Discharge: HOME | End: 2024-02-13
Attending: FAMILY MEDICINE
Payer: MEDICAID

## 2024-02-13 PROCEDURE — H2035 A/D TX PROGRAM, PER HOUR: HCPCS | Mod: HQ

## 2024-02-13 PROCEDURE — 1002N00001 HC LODGING PLUS FACILITY CHARGE ADULT

## 2024-02-13 NOTE — GROUP NOTE
"Group Therapy Documentation    PATIENT'S NAME: Jose Alberto Cuellar  MRN:   [id number]  :   1975  ACCT. NUMBER: [id number]  DATE OF SERVICE: 24  START TIME: 12:30 PM  END TIME:  2:30 PM  FACILITATOR(S): Veronica Castellano LADC  TOPIC: BEH Group Therapy  Number of patients attending the group:  8  Group Length:  2 Hours    Group Therapy Type: Emotion processing    Summary of Group / Topics Discussed:    Balanced lifestyle, Emotions/expression, and Relapse prevention    Group Attendance:  Attended group session    Patient's response to the group topic/interactions:  cooperative with task    Patient appeared to be Actively participating, Attentive, and Engaged.        Client specific details: Pt offered his peer feedback on his \"Relapse Prevention\" assignment, welcomed a new peer, and took part in a group discussion of early recovery and change.  "

## 2024-02-13 NOTE — GROUP NOTE
Group Therapy Documentation    PATIENT'S NAME: Jose Alberto Cuellar  MRN:   [id number]  :   1975  ACCT. NUMBER: [id number]  DATE OF SERVICE: 24  START TIME:  3:00 PM  END TIME:  4:00 PM  FACILITATOR(S): Johann Townsend LADC; Tammy Leroy LADC; Pebbles Herrera LADC  TOPIC: BEH Group Therapy  Number of patients attending the group:  27  Group Length:  1 Hours    Group Therapy Type: Recovery strategies    Summary of Group / Topics Discussed:    Recovery Principles, Sober coping skills, and Leisure explorations/use of leisure time        Group Attendance:  Attended group session    Patient's response to the group topic/interactions:  cooperative with task    Patient appeared to be Actively participating.        Client specific details:  Jose Alberto attended afternoon skills group and participated in processing discussion. Patient remained engaged and participated throughout group session.       ROWAN Philip

## 2024-02-13 NOTE — GROUP NOTE
Group Therapy Documentation    PATIENT'S NAME: Jose Alberto Cuellar  MRN:   [id number]  :   1975  ACCT. NUMBER: [id number]  DATE OF SERVICE: 24  START TIME:  9:00 AM  END TIME: 11:00 AM  FACILITATOR(S): Stewart Arechiga LADC  TOPIC: BEH Group Therapy  Number of patients attending the group:  8  Group Length:  2 Hours    Group Therapy Type: Recovery strategies and Emotion processing    Summary of Group / Topics Discussed:    Recovery Principles, Sober coping skills, Balanced lifestyle, Disease of addiction, Emotions/expression, and Relapse prevention      Group Attendance:  Attended group session    Patient's response to the group topic/interactions:  cooperative with task, discussed personal experience with topic, expressed readiness to alter behaviors, expressed understanding of topic, gave appropriate feedback to peers, listened actively, and offered helpful suggestions to peers    Patient appeared to be Actively participating, Attentive, and Engaged.        Client specific details:  Jose Alberto gave appropriate feedback. .

## 2024-02-14 ENCOUNTER — HOSPITAL ENCOUNTER (OUTPATIENT)
Dept: BEHAVIORAL HEALTH | Facility: CLINIC | Age: 49
Discharge: HOME | End: 2024-02-14
Attending: FAMILY MEDICINE
Payer: MEDICAID

## 2024-02-14 DIAGNOSIS — F32.A DEPRESSION, UNSPECIFIED DEPRESSION TYPE: ICD-10-CM

## 2024-02-14 DIAGNOSIS — G47.00 INSOMNIA, UNSPECIFIED TYPE: ICD-10-CM

## 2024-02-14 PROCEDURE — H2035 A/D TX PROGRAM, PER HOUR: HCPCS | Mod: HQ

## 2024-02-14 PROCEDURE — 1002N00001 HC LODGING PLUS FACILITY CHARGE ADULT

## 2024-02-14 RX ORDER — VENLAFAXINE 37.5 MG/1
TABLET ORAL
Qty: 60 TABLET | Refills: 0 | Status: ON HOLD | OUTPATIENT
Start: 2024-02-14 | End: 2024-02-29

## 2024-02-14 RX ORDER — TRAZODONE HYDROCHLORIDE 50 MG/1
50 TABLET, FILM COATED ORAL AT BEDTIME
Qty: 30 TABLET | Refills: 0 | Status: SHIPPED | OUTPATIENT
Start: 2024-02-14 | End: 2024-03-18

## 2024-02-14 NOTE — PROGRESS NOTES
[address] and [address] Floors  [place] [address]              Jose Alberto Cuellar, 1975 : was admitted for evaluation/treatment of chemical dependency at [facility].  He took part in these program(s):     ______ The Inpatient Program   ______ The Outpatient Program   ___X___ The [facility]ging [facility] Program   ______ Lodging Day Outpatient         Date admitted: 1/18/24    Date discharged: 2/15/24     Type of discharge:     ___X__ Satisfactory - completed evaluation / treatment   ______ Discharged without completing   ______ Behavioral discharge   ______ Transferred to another chemical dependency program   ______ Transferred to another type of service   ______ Left against medical advice (AMA) / Eloped               Clinicians: ROWAN Noriega & ROWAN Pereyra, [facility]     Date: 2/14/24           Time: 3:30pm

## 2024-02-14 NOTE — PROGRESS NOTES
[facility]   Client Name: Jose Alberto Cuellar MR#:  [id number]  YOB: 1975        Age: 49 years old Sex: Male    Focus of Treatment / Discharge Recommendations  Personal Safety/ Management of Symptoms  * Follow your safety plan.  Report increased symptoms to your care team and /or go to the nearest Emergency Department.  * Call crisis lines as needed    [facility] [phone]                Crisis Connection [phone]  [facility]--[phone]               [facility] COPE [phone]  [facility] [phone]           National Suicide Prevention [phone] OR Text/Call 748  [facility] [phone]             Suicide Prevention [phone]   Crisis Line: [phone]    [place] [facility]: [phone]  [place] [facility]: [phone]  [place] [facility] Connection: [phone]    Abstinence/Relapse Prevention  * Take all medicines as directed.  Carry a current list of medicines with you.  * Use coping skills: nutrition, rest, exercise, spirituality, journal, meditation, engage in activities you enjoy, and sober support resources.   * Remain abstinent from alcohol and non-prescribed, mood altering substances.     Develop/Improve Independent Living/Socialization Skills: Ensure living environment is conducive to sobriety.     Community Resources/Supports and Discharge Planning:    Follow up with medical appointments as needed and as given in nursing discharge instructions.    Follow up with your individual therapy in your community.    Go to group therapy and / or support groups at: Phase II or IOP in your area    Attend 2-3 sober support meetings weekly and work with a sponsor.    Client Signature:_______________________   Date / Time:___________    Staff Signature:________________________   Date / Time:___________

## 2024-02-14 NOTE — TELEPHONE ENCOUNTER
Dr Matt,  please would you approve refills on the 2 attached medications.  Pt has upcoming appt to establish care with [facility] provider on 3/13/24.    Sonali Dumont RN    [facility]  Nurse Liaison / CD [facility] (LP)  [facility] [address]  O:[phone]  F:[phone]  RN [place] [id number]  LP After hours(UC):[phone]  LP admin counselor:[phone]  CD assess:[phone]

## 2024-02-14 NOTE — PROGRESS NOTES
Nursing Discharge Planning Meeting    Writer completed discharge planning meeting with patient. Discharge is planned for 3/15/24.    Discussed appropriate follow up care to manage JACKI/Medical/MH and to obtain medication refills. Patient given a copy of their current medications for reference. Questions were answered at this time and the patient verbalized an understanding of the post-discharge follow up plan.    Patient will f/u with [facility] Provider in [facility] Bldg as recommended and/or is aware of upcoming appt:  [facility]  [address] [place] [address], [address] [facility] [address] [phone]    Appt date and time: 3/13/24 at 9AM  Provider: Yariel Cuellar NP    Continue to support patient in discharge planning as needed to assure appropriate continuity of care.     Tobacco Cessation  Patient participated in the nicotine replacement therapy for tobacco cessation or reduction during their treatment programming: Yes    The patient was provided with community resources for follow-up to continue tobacco cessation support once in the community. Also the patient was encouraged to discuss their tobacco cessation efforts with the primary care provider.    [facility]  Nurse Liaison / CD [facility]  O: [phone]  Fax:[phone]  [facility] [place] [id number]  M-F: 7AM to 5:30PM   Sat-Sun: 7AM to 3PM  After hours: [phone]

## 2024-02-14 NOTE — GROUP NOTE
Group Therapy Documentation    PATIENT'S NAME: Jose Alberto Cuellar  MRN:   [id number]  :   1975  ACCT. NUMBER: [id number]  DATE OF SERVICE: 24  START TIME:  9:30 AM  END TIME: 11:15 AM  FACILITATOR(S): Randi Lord LADC  TOPIC: BEH Group Therapy  Number of patients attending the group:  7  Group Length:  2 Hours    Group Therapy Type: Emotion processing    Summary of Group / Topics Discussed:    Disease of addiction and Relapse prevention      Group Attendance:  Attended group session    Patient's response to the group topic/interactions:  cooperative with task    Patient appeared to be Actively participating, Attentive, and Engaged.        Client specific details: Jose Alberto participated in 2 peer graduation ceremonies and presented his Relapse Prevention Plan assignment.

## 2024-02-14 NOTE — GROUP NOTE
Group Therapy Documentation    PATIENT'S NAME: Jose Alberto Cuellar  MRN:   [id number]  :   1975  ACCT. NUMBER: [id number]  DATE OF SERVICE: 24  START TIME: 12:30 PM  END TIME:  2:30 PM  FACILITATOR(S): Johann Tonwsend LADC  TOPIC: BEH Group Therapy  Number of patients attending the group:  7  Group Length:  2 Hours    Group Therapy Type: Recovery strategies    Summary of Group / Topics Discussed:    Recovery Principles, Mindfulness/Relaxation, Coping/DBT informed care, Trauma informed care, Disease of addiction, and Emotions/expression      Group Attendance:  Attended group session    Patient's response to the group topic/interactions:  cooperative with task    Patient appeared to be Attentive and Engaged.        Client specific details:  Jose Alberto participated in afternoon group. He took part in a mindfulness activity and discussion. For the second half of group he listened to and gave positive feedback on his peer's assignment.

## 2024-02-14 NOTE — GROUP NOTE
Group Therapy Documentation    PATIENT'S NAME: Jose Alberto Cuellar  MRN:   [id number]  :   1975  ACCT. NUMBER: [id number]  DATE OF SERVICE: 24  START TIME:  8:30 AM  END TIME:  9:30 AM  FACILITATOR(S): Randi Lord LADC; Constantine Malloy LADC  TOPIC: BEH Group Therapy  Number of patients attending the group:  21  Group Length:  1 Hours    Group Therapy Type: Emotion processing    Summary of Group / Topics Discussed:    Co-occurring illnesses symptom management and Emotions/expression      Group Attendance:  Attended group session    Patient's response to the group topic/interactions:  cooperative with task    Patient appeared to be Attentive and Engaged.        Client specific details: Jose Alberto was an active participant in lecture on stress, anxiety, and sober coping skills.

## 2024-02-15 ENCOUNTER — TELEPHONE (OUTPATIENT)
Dept: BEHAVIORAL HEALTH | Facility: CLINIC | Age: 49
End: 2024-02-15
Payer: MEDICAID

## 2024-02-15 NOTE — PROGRESS NOTES
COUNSELOR S DISCHARGE SUMMARY    Date: 2/15/2024  Program Name:  [facility]    Client Name; Jose Alberto Cuellar YOB: 1975      MRN #  [id number]    Referred by; : ROWAN Gomez         Release copies to :N/A      Admit date: 24  Discharge Date:  2/15/24  # of Days Attended: 28    Date Last Attended: 24     Discharge Status:  With Staff Approval    PROBLEMS PRESENTED AT ADMISSION:  (Include reasons & circumstances for admission)  Jose Alberto Cuellar is a 49 year old year old male The patient had a IP Substance Use Disorder Assessment on 24 completed by Suad Lozano MA, LADC. The patient was then seen foe a face to face update of the IP Substance Use Disorder Assessment on 24 by ROWAN Gomez.      Admitting diagnosis: Alcohol Use Disorder, Severe F10.20-(303.90)      PROGRAM PARTICIPATION:  While at [facility], Jose Alberto Cuellar received the following services to address substance use and mental health disorders.  he participated in:  Group Therapy, Individual Therapy, Spirituality Groups, DBT Skills Groups, and AA/NA meetings       PROGRESS:     Dimension 1 - Acute Intoxication/Withdrawal Potential:  Admission ASAM Risk Ratin Client displays full functioning with good ability to tolerate and cope with withdrawal discomfort. No signs or symptoms of intoxication or withdrawal or resolving signs or symptoms.    Discharge ASAM Risk Ratin Client displays full functioning with good ability to tolerate and cope with withdrawal discomfort. No signs or symptoms of intoxication or withdrawal or resolving signs or symptoms.    Treatment goal(s): Substance use cravings and urges patient's last use date was reported at 2024 The goal is for the patient to develop effective strategies to maintain sobriety and be able to manage mild to moderate withdrawal symptoms.    Progress toward goal(s): In order to address  this the patient was to report to the nurse and counselors any increase in withdrawal symptoms ASAP.      Dimension 2 - Biomedical Conditions & Complications:  Admission ASAM Risk Ratin Client displays full functioning with good ability to cope with physical discomfort.    Discharge ASAM Risk Ratin Client displays full functioning with good ability to cope with physical discomfort.    Treatment goal(s): The patient has a medical diagnosis of high blood pressure and hypothyroidism the goal is for the patient to follow the recommendations of medical provider    Progress toward goal(s): In order to address this the patient was to take prescribed medications and follow-up with medical interventions as needed throughout treatment process      Dimension 3 - Emotional/Behavioral Conditions & Complications:  Admission ASAM Risk Ratin Client has difficulty with impulse control and lacks coping skills. Client has thoughts of suicide or harm to others without means; however, the thoughts may interfere with participation in some treatment activities. Client has difficulty functioning in significant life areas. Client has moderate symptoms of emotional, behavioral, or cognitive problems. Client is able to participate in most treatment activities.    Discharge ASAM Risk Ratin Client has difficulty with impulse control and lacks   coping skills. Client has thoughts of suicide or harm to others without means; however, the thoughts may interfere with participation in some treatment activities. Client has difficulty functioning in significant life areas. Client has moderate symptoms of emotional, behavioral, or cognitive problems. Client is able to participate in most treatment activities.    Treatment goal(s): Patient had a diagnosis of depression and anxiety. The goal was for the patient to understand the relationship between addiction and mental health issues    Progress toward goal(s): In order to address this  "the patient was to turn in safety plan he  completed and review with counselor.The patient also met with counselor and therapist for individual therapy weekly as desired. The patient completed the assignment to \"Overcoming Negative Thinking and shared in group      Dimension 4 - Treatment Acceptance/Resistance:  Admission ASAM Risk Ratin Client displays verbal compliance, but lacks consistent behaviors; has low motivation for change; and is passively involved in treatment.    Discharge ASAM Risk Ratin Client displays verbal compliance, but lacks consistent behaviors; has low motivation for change; and is passively involved in treatment.    Treatment goal(s): The patient does not recognize relapse triggers and warning signs. The goal is for the patient to identify personal triggers and relapse warning signs and use their insight to remain sober      Progress toward goal(s):To address this the patient completed his \"First Step\" assignment and shared in group. Patient also completed his \"Drug Use History\" assignment and shared in group      Dimension 5 - Relapse/Continued Problem Potential:  Admission ASAM Risk Ratin No awareness of the negative impact of mental health problems or substance abuse. No coping skills to arrest mental health or addiction illnesses, or prevent relapse.    Discharge ASAM Risk Ratin No awareness of the negative impact of mental health problems or substance abuse. No coping skills to arrest mental health or addiction illnesses, or prevent relapse.    Treatment goal(s): The patient does not recognize relapse triggers and warning signs. The goal is for the patient to identify personal triggers and relapse assignment and use that insight to remain sober long-term    Progress toward goal(s): In order to address this the patient completed his \"Identifying Relapse Triggers and Cues: Situations and Feelings\" assignment and sharing group Patient also completed his \"Relapse\" assignment " "and shared in group    Treatment goal(s): Patient reporting guilt and shame about behaviors associated with his use.The goals of the patient was to understand his feelings of guilt and shame and develop effective coping strategies to manage these emotions.    Progress towards goal(s): In order to address this the patient completed his \"Guilt and Shame Packet\" assignment and shared in group    Treatment goal(s): Patient lacks a daily routine that includes healthy and sober living skills. The goal for the patient to develop sober coping living skills and use that insight for long-term sobriety    Progress towards goal(s): In order to address this the patient completed his assignment \"5 Years Sober versus 5 Years Using' assignment and shared in group. patient also completed his\"Lifestyle Evaluation\" assignment and shared in group    Treatment goal(s): Patient reported how stress has negatively impacted his recovery attempts. The goal of the patient was to develop stress management skills.     Progress towards goal(s): In order to address this the patient completed his \"Stress and Recovery\" assignment and shared in group. The patient also completed his \"Stress Management\" assignment and shared in group      Dimension 6 - Recovery Environment: (family, recreation, legal, education, etc.)  Admission ASAM Risk Ratin Client has (A) Chronically antagonistic significant other, living environment, family, peer group or long-term criminal justice involvement that is harmful to recovery or treatment progress, or (B) Client has an actively antagonistic significant other, family, work, or living environment with immediate threat to the client's safety and well-being.    Discharge ASAM Risk Ratin Client has (A) Chronically antagonistic significant other, living environment, family, peer group or long-term criminal justice involvement that is harmful to recovery or treatment progress, or (B) Client has an actively " antagonistic significant other, family, work, or living environment with immediate threat to the client's safety and well-being.    Treatment goal(s): Patient lacks a sober support network. The goal was for the patient to develop a sober support network while in treatment [facility].      Progress toward goal(s): In order to address this the patient attended all nightly 12-step meetings while in treatment and patient was to work to obtain a temporary sponsor while in [facility].    Treatment goal(s): Patient lacks an aftercare treatment program.The goal is for the patient to develop an aftercare plan that is supportive of his recovery    Progress towards goal(s): In order to address this the patient was to explore [facility] houses and sober living placement. Patient was also to explore aftercare treatment options for graduation from       Client strengths identified during treatment were: Jose Alberto maintained a positive attitude while in treatment. He was active participant in group settings and was always open for feedback from his peers. He worked to remain open and honest and earnestly attempted to challenge himself to work on self improvement. Jose Alberto appears motivated for recovery at this time and willing to incorporate positive changes into his life      Client needs identified during treatment were: Mental health struggles poor self-esteem, no sober support network    Discharge Diagnosis: Alcohol Use Disorder, Severe F10.20-(303.90)    Discharge Medications:   Current Outpatient Medications   Medication    amLODIPine (NORVASC) 10 MG tablet    folic acid (FOLVITE) 1 MG tablet    multivitamin w/minerals (THERA-VIT-M) tablet    nicotine (NICODERM CQ) 21 MG/24HR 24 hr patch    testosterone cypionate (DEPOTESTOSTERONE) 200 MG/ML injection    thiamine (B-1) 100 MG tablet    traZODone (DESYREL) 50 MG tablet    triamcinolone (ARISTOCORT HP) 0.5 % external cream    venlafaxine (EFFEXOR) 37.5 MG tablet     No current  facility-administered medications for this encounter.       Discharge Plan and Recommendations:     Living arrangements at discharge: Patient plans to discharge home and will admit to [facility] Phase II outpatient programming.    1.)  Abstain from all mood altering chemicals  2. ) Attend a minimum of three 12-step meetings per week  3.) Obtain a sponsor and maintain regular contact with him  4.) Admit to [facility] Out[facility] and complete program.  5.) Monitor and compliant with the advice of doctors regarding physical health issues.             Take all medications as prescribed  6.) Engage in individual therapy to continue addressing mental health concerns  7.) Continue to invest in building a sober support network  8.) Continue to monitor and gain an understanding of relapse triggers and stressors                through the use and development of healthy coping skills.    Prognosis:  Good    Staff Signature: ROWAN Solis

## 2024-02-15 NOTE — TELEPHONE ENCOUNTER
----- Message from ROWAN Pereyra sent at 2/15/2024 10:50 AM CST -----  Regarding: Phase II Referral  Scheduling Request     Patient Name: Jose Alberto Cuellar  Location of programming: [facility]   Start Date: 2/22/24   Group: [id number] on Thursday at 5:30 PM to 7:00 PM   Attending Provider (MD): Lynsey Matt   Number of visits to be scheduled: 10   Duration of Appointment in minutes: 90   Visit Type: In-person or Treatment - 870     Additional notes: Phase 2, group B2

## 2024-02-15 NOTE — ADDENDUM NOTE
Encounter addended by: Pebbles Herrera LADC on: 2/15/2024 11:47 AM   Actions taken: Clinical Note Signed

## 2024-02-15 NOTE — ADDENDUM NOTE
Encounter addended by: Pebbles Herrera LADC on: 2/15/2024 10:26 AM   Actions taken: Clinical Note Signed

## 2024-02-22 ENCOUNTER — HOSPITAL ENCOUNTER (OUTPATIENT)
Dept: BEHAVIORAL HEALTH | Facility: CLINIC | Age: 49
Discharge: HOME | End: 2024-02-22
Attending: FAMILY MEDICINE
Payer: MEDICAID

## 2024-02-22 PROCEDURE — H2035 A/D TX PROGRAM, PER HOUR: HCPCS | Mod: HQ

## 2024-02-23 NOTE — PROGRESS NOTES
Patient:  Jose Alberto Cuellar            Adult CD Progress Note and Treatment Plan Review     Attendance  Please refer to OP BEH CD Adult Attendance Record Documentation Flowsheet    Support group attended this week: Yes    Reporting sobriety:  yes    Treatment Plan     Treatment Plan Review competed on: 2/22/24       Client preferred learning style: Visual  Hands on  Verbal  Demonstration    Staff Members contributing ROWAN Solis      Received Supervision: No    Client: contributed to goals and plan.    Client received copy of plan/revised plan: Yes    Client agrees with plan/revised plan: Yes    Changes to Treatment Plan: No    New Goals added since last review None    Goals worked on since last review: (See ASAM notes listed below)    Strategies effective: yes    Strategies need these changes: Continue to work on recovery    ASAM Risk Rating:    Dimension 1 0 Patient reports no withdrawal symptomology at this time.      Dimension 2 0 Patient reports no biomedical issues that would interfere with his ability to complete program.      Dimension 3 2 Patient is working to gain awareness regarding how his substance abuse negatively affects  his mental and emotional well being. Patient reports feeling depressed this week and discussed ways he could combat  those emotions by working out and attending AA meetings.      Dimension 4 2 Patient is working on his internal motivation for change. Patient reports that he is motivated by wanting a better life and improved health.       Dimension 5 4 Patient is working to gain awareness and insight regarding his triggers, cues and early warning signs for relapse. Patient rates his cravings as a 9, 1-(low)-10-(high). Patient reports that he's managed his cravings by working out and attending AA meetings.         Dimension 6 4 Patient is working developing his sober support network and is working to obtain a sponsor while continuing to attend meetings and surround  himself with sober relationships.     Any changes in Vulnerable Adult Status?  No  If yes, add to treatment plan and individual abuse prevention plan.    Family Involvement:   Patient reports that is family is very involved and concerned regarding his recovery.    Data:   offered feedback client did actively participate      Intervention:   Emotional management  Motivational Enhancement Therapy  Relapse prevention  Twelve Step facilitation      Assessment:   Stages of Change Model  Preparation/Determination    Appears/Sounds:  Cooperative  Motivated  Engaged      Plan:  Focus on recovery environment  Monitor emotional/physical health      ROWAN Solis

## 2024-02-23 NOTE — GROUP NOTE
Group Therapy Documentation    PATIENT'S NAME: Jose Alberto Cuellar  MRN:   [id number]  :   1975  ACCT. NUMBER: [id number]  DATE OF SERVICE: 24  START TIME:  5:30 PM  END TIME:  7:00 PM  FACILITATOR(S): Pebbles Herrera LADC  TOPIC: BEH Group Therapy  Number of patients attending the group:  7    Group Length:  1.5 Hours    Group Therapy Type: Recovery strategies, Daily living/independence skills, and Health and wellbeing     Summary of Group / Topics Discussed:    Recovery Principles, Sober coping skills, Relationship/socialization, and Relapse prevention      Group Attendance:  Attended group session    Patient's response to the group topic/interactions:  cooperative with task    Patient appeared to be Actively participating, Attentive, and Engaged.        Client specific details:  Patient attended group session and was attentive and participative.

## 2024-02-26 ENCOUNTER — HOSPITAL ENCOUNTER (INPATIENT)
Facility: CLINIC | Age: 49
LOS: 3 days | Discharge: HOME | End: 2024-02-29
Attending: EMERGENCY MEDICINE | Admitting: PSYCHIATRY & NEUROLOGY
Payer: MEDICAID

## 2024-02-26 ENCOUNTER — TELEPHONE (OUTPATIENT)
Dept: BEHAVIORAL HEALTH | Facility: CLINIC | Age: 49
End: 2024-02-26
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
## 2024-02-26 DIAGNOSIS — F10.139 ALCOHOL ABUSE WITH WITHDRAWAL (H): ICD-10-CM

## 2024-02-26 DIAGNOSIS — F41.9 ANXIETY: ICD-10-CM

## 2024-02-26 DIAGNOSIS — F10.20 ALCOHOL USE DISORDER, SEVERE, DEPENDENCE (H): Primary | ICD-10-CM

## 2024-02-26 DIAGNOSIS — F17.200 NICOTINE USE DISORDER: ICD-10-CM

## 2024-02-26 DIAGNOSIS — F33.1 MODERATE EPISODE OF RECURRENT MAJOR DEPRESSIVE DISORDER (H): ICD-10-CM

## 2024-02-26 LAB
ALBUMIN SERPL BCG-MCNC: 4.8 G/DL (ref 3.5–5.2)
ALCOHOL BREATH TEST: 0.04 (ref 0–0.01)
ALP SERPL-CCNC: 69 U/L (ref 40–150)
ALT SERPL W P-5'-P-CCNC: 115 U/L (ref 0–70)
AMPHETAMINES UR QL SCN: NORMAL
ANION GAP SERPL CALCULATED.3IONS-SCNC: 12 MMOL/L (ref 7–15)
AST SERPL W P-5'-P-CCNC: 108 U/L (ref 0–45)
BARBITURATES UR QL SCN: NORMAL
BASOPHILS # BLD AUTO: 0 10E3/UL (ref 0–0.2)
BASOPHILS NFR BLD AUTO: 1 %
BENZODIAZ UR QL SCN: NORMAL
BILIRUB SERPL-MCNC: 0.5 MG/DL
BUN SERPL-MCNC: 14.6 MG/DL (ref 6–20)
BZE UR QL SCN: NORMAL
CALCIUM SERPL-MCNC: 9.5 MG/DL (ref 8.6–10)
CANNABINOIDS UR QL SCN: NORMAL
CHLORIDE SERPL-SCNC: 95 MMOL/L (ref 98–107)
CREAT SERPL-MCNC: 0.99 MG/DL (ref 0.67–1.17)
DEPRECATED HCO3 PLAS-SCNC: 27 MMOL/L (ref 22–29)
EGFRCR SERPLBLD CKD-EPI 2021: >90 ML/MIN/1.73M2
EOSINOPHIL # BLD AUTO: 0 10E3/UL (ref 0–0.7)
EOSINOPHIL NFR BLD AUTO: 0 %
ERYTHROCYTE [DISTWIDTH] IN BLOOD BY AUTOMATED COUNT: 12.8 % (ref 10–15)
FENTANYL UR QL: NORMAL
GLUCOSE SERPL-MCNC: 113 MG/DL (ref 70–99)
HCT VFR BLD AUTO: 46.2 % (ref 40–53)
HGB BLD-MCNC: 16.6 G/DL (ref 13.3–17.7)
IMM GRANULOCYTES # BLD: 0 10E3/UL
IMM GRANULOCYTES NFR BLD: 0 %
LYMPHOCYTES # BLD AUTO: 1.1 10E3/UL (ref 0.8–5.3)
LYMPHOCYTES NFR BLD AUTO: 13 %
MAGNESIUM SERPL-MCNC: 1.9 MG/DL (ref 1.7–2.3)
MCH RBC QN AUTO: 32.7 PG (ref 26.5–33)
MCHC RBC AUTO-ENTMCNC: 35.9 G/DL (ref 31.5–36.5)
MCV RBC AUTO: 91 FL (ref 78–100)
MONOCYTES # BLD AUTO: 0.8 10E3/UL (ref 0–1.3)
MONOCYTES NFR BLD AUTO: 9 %
NEUTROPHILS # BLD AUTO: 6.3 10E3/UL (ref 1.6–8.3)
NEUTROPHILS NFR BLD AUTO: 77 %
NRBC # BLD AUTO: 0 10E3/UL
NRBC BLD AUTO-RTO: 0 /100
OPIATES UR QL SCN: NORMAL
PCP QUAL URINE (ROCHE): NORMAL
PLATELET # BLD AUTO: 231 10E3/UL (ref 150–450)
POTASSIUM SERPL-SCNC: 4.3 MMOL/L (ref 3.4–5.3)
PROT SERPL-MCNC: 7.8 G/DL (ref 6.4–8.3)
RBC # BLD AUTO: 5.07 10E6/UL (ref 4.4–5.9)
SODIUM SERPL-SCNC: 134 MMOL/L (ref 135–145)
WBC # BLD AUTO: 8.2 10E3/UL (ref 4–11)

## 2024-02-26 PROCEDURE — 250N000011 HC RX IP 250 OP 636: Performed by: FAMILY MEDICINE

## 2024-02-26 PROCEDURE — 83735 ASSAY OF MAGNESIUM: CPT | Performed by: PHYSICIAN ASSISTANT

## 2024-02-26 PROCEDURE — 250N000013 HC RX MED GY IP 250 OP 250 PS 637: Performed by: PHYSICIAN ASSISTANT

## 2024-02-26 PROCEDURE — 128N000004 HC R&B CD ADULT

## 2024-02-26 PROCEDURE — 99285 EMERGENCY DEPT VISIT HI MDM: CPT | Performed by: EMERGENCY MEDICINE

## 2024-02-26 PROCEDURE — 82075 ASSAY OF BREATH ETHANOL: CPT | Performed by: EMERGENCY MEDICINE

## 2024-02-26 PROCEDURE — 250N000013 HC RX MED GY IP 250 OP 250 PS 637: Performed by: PSYCHIATRY & NEUROLOGY

## 2024-02-26 PROCEDURE — 250N000013 HC RX MED GY IP 250 OP 250 PS 637: Performed by: NURSE PRACTITIONER

## 2024-02-26 PROCEDURE — 36415 COLL VENOUS BLD VENIPUNCTURE: CPT | Performed by: PHYSICIAN ASSISTANT

## 2024-02-26 PROCEDURE — 82040 ASSAY OF SERUM ALBUMIN: CPT | Performed by: PHYSICIAN ASSISTANT

## 2024-02-26 PROCEDURE — 84155 ASSAY OF PROTEIN SERUM: CPT | Performed by: PHYSICIAN ASSISTANT

## 2024-02-26 PROCEDURE — 80307 DRUG TEST PRSMV CHEM ANLYZR: CPT | Performed by: PHYSICIAN ASSISTANT

## 2024-02-26 PROCEDURE — 99285 EMERGENCY DEPT VISIT HI MDM: CPT | Mod: FS | Performed by: EMERGENCY MEDICINE

## 2024-02-26 PROCEDURE — 85025 COMPLETE CBC W/AUTO DIFF WBC: CPT | Performed by: PHYSICIAN ASSISTANT

## 2024-02-26 RX ORDER — ATENOLOL 50 MG/1
50 TABLET ORAL DAILY PRN
Status: DISCONTINUED | OUTPATIENT
Start: 2024-02-26 | End: 2024-02-29 | Stop reason: HOSPADM

## 2024-02-26 RX ORDER — LEVOTHYROXINE SODIUM 75 UG/1
75 TABLET ORAL DAILY
Status: ON HOLD | COMMUNITY
End: 2024-06-13

## 2024-02-26 RX ORDER — NICOTINE 21 MG/24HR
1 PATCH, TRANSDERMAL 24 HOURS TRANSDERMAL DAILY
Status: DISCONTINUED | OUTPATIENT
Start: 2024-02-27 | End: 2024-02-27

## 2024-02-26 RX ORDER — IBUPROFEN 400 MG/1
400 TABLET, FILM COATED ORAL EVERY 6 HOURS PRN
Status: DISCONTINUED | OUTPATIENT
Start: 2024-02-26 | End: 2024-02-29 | Stop reason: HOSPADM

## 2024-02-26 RX ORDER — FOLIC ACID 1 MG/1
1 TABLET ORAL DAILY
Status: DISCONTINUED | OUTPATIENT
Start: 2024-02-26 | End: 2024-02-29 | Stop reason: HOSPADM

## 2024-02-26 RX ORDER — ONDANSETRON 4 MG/1
4 TABLET, ORALLY DISINTEGRATING ORAL ONCE
Status: COMPLETED | OUTPATIENT
Start: 2024-02-26 | End: 2024-02-26

## 2024-02-26 RX ORDER — DIAZEPAM 5 MG
5-20 TABLET ORAL EVERY 30 MIN PRN
Status: DISCONTINUED | OUTPATIENT
Start: 2024-02-26 | End: 2024-02-29 | Stop reason: HOSPADM

## 2024-02-26 RX ORDER — MULTIPLE VITAMINS W/ MINERALS TAB 9MG-400MCG
1 TAB ORAL DAILY
Status: DISCONTINUED | OUTPATIENT
Start: 2024-02-26 | End: 2024-02-29 | Stop reason: HOSPADM

## 2024-02-26 RX ORDER — ONDANSETRON 4 MG/1
4 TABLET, ORALLY DISINTEGRATING ORAL EVERY 8 HOURS PRN
COMMUNITY
Start: 2024-02-25 | End: 2024-03-06

## 2024-02-26 RX ORDER — TRAZODONE HYDROCHLORIDE 50 MG/1
50 TABLET, FILM COATED ORAL
Status: DISCONTINUED | OUTPATIENT
Start: 2024-02-26 | End: 2024-02-27

## 2024-02-26 RX ORDER — MAGNESIUM HYDROXIDE/ALUMINUM HYDROXICE/SIMETHICONE 120; 1200; 1200 MG/30ML; MG/30ML; MG/30ML
30 SUSPENSION ORAL EVERY 4 HOURS PRN
Status: DISCONTINUED | OUTPATIENT
Start: 2024-02-26 | End: 2024-02-29 | Stop reason: HOSPADM

## 2024-02-26 RX ORDER — LOPERAMIDE HCL 2 MG
2 CAPSULE ORAL 4 TIMES DAILY PRN
Status: DISCONTINUED | OUTPATIENT
Start: 2024-02-26 | End: 2024-02-29 | Stop reason: HOSPADM

## 2024-02-26 RX ORDER — ONDANSETRON 4 MG/1
4 TABLET, FILM COATED ORAL EVERY 6 HOURS PRN
Status: DISCONTINUED | OUTPATIENT
Start: 2024-02-26 | End: 2024-02-27

## 2024-02-26 RX ORDER — AMOXICILLIN 250 MG
1 CAPSULE ORAL 2 TIMES DAILY PRN
Status: DISCONTINUED | OUTPATIENT
Start: 2024-02-26 | End: 2024-02-29 | Stop reason: HOSPADM

## 2024-02-26 RX ORDER — HYDROXYZINE HYDROCHLORIDE 25 MG/1
25 TABLET, FILM COATED ORAL EVERY 4 HOURS PRN
Status: DISCONTINUED | OUTPATIENT
Start: 2024-02-26 | End: 2024-02-29 | Stop reason: HOSPADM

## 2024-02-26 RX ADMIN — ONDANSETRON 4 MG: 4 TABLET, ORALLY DISINTEGRATING ORAL at 15:51

## 2024-02-26 RX ADMIN — IBUPROFEN 400 MG: 400 TABLET, FILM COATED ORAL at 21:49

## 2024-02-26 RX ADMIN — TRAZODONE HYDROCHLORIDE 50 MG: 50 TABLET ORAL at 21:52

## 2024-02-26 RX ADMIN — FOLIC ACID 1 MG: 1 TABLET ORAL at 15:43

## 2024-02-26 RX ADMIN — NICOTINE POLACRILEX 4 MG: 4 GUM, CHEWING BUCCAL at 21:52

## 2024-02-26 RX ADMIN — DIAZEPAM 10 MG: 5 TABLET ORAL at 20:16

## 2024-02-26 RX ADMIN — HYDROXYZINE HYDROCHLORIDE 25 MG: 25 TABLET, FILM COATED ORAL at 21:49

## 2024-02-26 RX ADMIN — ATENOLOL 50 MG: 50 TABLET ORAL at 21:49

## 2024-02-26 RX ADMIN — DIAZEPAM 10 MG: 5 TABLET ORAL at 15:43

## 2024-02-26 RX ADMIN — THIAMINE HCL TAB 100 MG 100 MG: 100 TAB at 15:44

## 2024-02-26 RX ADMIN — Medication 1 TABLET: at 15:43

## 2024-02-26 ASSESSMENT — ACTIVITIES OF DAILY LIVING (ADL)
HYGIENE/GROOMING: INDEPENDENT
ADLS_ACUITY_SCORE: 37
ADLS_ACUITY_SCORE: 42
ADLS_ACUITY_SCORE: 37
ADLS_ACUITY_SCORE: 37
ADLS_ACUITY_SCORE: 57
ORAL_HYGIENE: INDEPENDENT
DRESS: SCRUBS (BEHAVIORAL HEALTH);INDEPENDENT
ADLS_ACUITY_SCORE: 37
ADLS_ACUITY_SCORE: 42
ADLS_ACUITY_SCORE: 37

## 2024-02-26 ASSESSMENT — COLUMBIA-SUICIDE SEVERITY RATING SCALE - C-SSRS
6. HAVE YOU EVER DONE ANYTHING, STARTED TO DO ANYTHING, OR PREPARED TO DO ANYTHING TO END YOUR LIFE?: NO
1. IN THE PAST MONTH, HAVE YOU WISHED YOU WERE DEAD OR WISHED YOU COULD GO TO SLEEP AND NOT WAKE UP?: NO
2. HAVE YOU ACTUALLY HAD ANY THOUGHTS OF KILLING YOURSELF IN THE PAST MONTH?: NO

## 2024-02-26 ASSESSMENT — LIFESTYLE VARIABLES: SKIP TO QUESTIONS 9-10: 0

## 2024-02-26 NOTE — ED TRIAGE NOTES
Detox from ETOH: was recently in treatment until 2/15, last drink: 0800 today. Denies seizure history, SI, HI, Paranoia, hallucinations,  Denies any other drug use. 7/10 abdominal pain.  Currently symptoms: N/V, anxiety

## 2024-02-26 NOTE — ED PROVIDER NOTES
"ED Provider Note  [facility]      History     Chief Complaint   Patient presents with    Alcohol Problem       Alcohol Problem      48yo M pmhx thyroid disease and alcohol abuse presents requesting detox.  No history of alcohol withdrawal seizures or DTs.  Patient reports feeling [facility] treatment on the 15th, and has been drinking daily since then.  States prior to [facility] he was drinking 1.75 L of vodka a day.  Since [facility] he is unable to quantify his alcohol intake reporting that he buys different volumes when going to the liquor store.  Reports frequently buying multiple airplane bottles of hard liquor, making up to 5 trips to the liquor store daily.  Last drink was roughly 7.5 hours ago.  At present patient feels that he is withdrawing with tremors, generalized abdominal pain, anxiety and vomiting.  Denies SI or HI.    Past Medical History  History reviewed. No pertinent past medical history.  History reviewed. No pertinent surgical history.  No current outpatient medications on file.    Allergies   Allergen Reactions    Penicillins Angioedema and Rash     Converted from Drug Class Allergy: Penicillins     Family History  History reviewed. No pertinent family history.  Social History   Social History     Tobacco Use    Smoking status: Every Day     Types: Cigarettes    Smokeless tobacco: Never   Vaping Use    Vaping Use: Never used   Substance Use Topics    Alcohol use: Yes     Comment: Vodka    Drug use: Not Currently         A medically appropriate review of systems was performed with pertinent positives and negatives noted in the HPI, and all other systems negative.    Physical Exam   BP: 134/89  Pulse: 111  Temp: 99.1  F (37.3  C)  Resp: 14  Height: 175.3 cm (5' 9\")  Weight: 99.8 kg (220 lb)  SpO2: 97 %  Physical Exam  Constitutional:       General: He is not in acute distress.     Appearance: He is well-developed. He is not diaphoretic.   HENT:    "   Head: Normocephalic and atraumatic.      Nose: No congestion.      Mouth/Throat:      Mouth: Mucous membranes are moist.   Eyes:      Conjunctiva/sclera: Conjunctivae normal.   Cardiovascular:      Rate and Rhythm: Normal rate.   Pulmonary:      Effort: Pulmonary effort is normal.   Abdominal:      General: Abdomen is flat.   Musculoskeletal:      Cervical back: Normal range of motion and neck supple.   Skin:     General: Skin is warm and dry.      Capillary Refill: Capillary refill takes less than 2 seconds.      Findings: No rash.   Neurological:      Mental Status: He is alert and oriented to person, place, and time.      Comments: Fine hand tremors noted bilaterally  Fine tongue fasciculations present.           ED Course, Procedures, & Data      Procedures               Results for orders placed or performed during the hospital encounter of 02/26/24   Comprehensive metabolic panel     Status: Abnormal   Result Value Ref Range    Sodium 134 (L) 135 - 145 mmol/L    Potassium 4.3 3.4 - 5.3 mmol/L    Carbon Dioxide (CO2) 27 22 - 29 mmol/L    Anion Gap 12 7 - 15 mmol/L    Urea Nitrogen 14.6 6.0 - 20.0 mg/dL    Creatinine 0.99 0.67 - 1.17 mg/dL    GFR Estimate >90 >60 mL/min/1.73m2    Calcium 9.5 8.6 - 10.0 mg/dL    Chloride 95 (L) 98 - 107 mmol/L    Glucose 113 (H) 70 - 99 mg/dL    Alkaline Phosphatase 69 40 - 150 U/L     (H) 0 - 45 U/L     (H) 0 - 70 U/L    Protein Total 7.8 6.4 - 8.3 g/dL    Albumin 4.8 3.5 - 5.2 g/dL    Bilirubin Total 0.5 <=1.2 mg/dL   Magnesium     Status: Normal   Result Value Ref Range    Magnesium 1.9 1.7 - 2.3 mg/dL   CBC with platelets and differential     Status: None   Result Value Ref Range    WBC Count 8.2 4.0 - 11.0 10e3/uL    RBC Count 5.07 4.40 - 5.90 10e6/uL    Hemoglobin 16.6 13.3 - 17.7 g/dL    Hematocrit 46.2 40.0 - 53.0 %    MCV 91 78 - 100 fL    MCH 32.7 26.5 - 33.0 pg    MCHC 35.9 31.5 - 36.5 g/dL    RDW 12.8 10.0 - 15.0 %    Platelet Count 231 150 - 450  10e3/uL    % Neutrophils 77 %    % Lymphocytes 13 %    % Monocytes 9 %    % Eosinophils 0 %    % Basophils 1 %    % Immature Granulocytes 0 %    NRBCs per 100 WBC 0 <1 /100    Absolute Neutrophils 6.3 1.6 - 8.3 10e3/uL    Absolute Lymphocytes 1.1 0.8 - 5.3 10e3/uL    Absolute Monocytes 0.8 0.0 - 1.3 10e3/uL    Absolute Eosinophils 0.0 0.0 - 0.7 10e3/uL    Absolute Basophils 0.0 0.0 - 0.2 10e3/uL    Absolute Immature Granulocytes 0.0 <=0.4 10e3/uL    Absolute NRBCs 0.0 10e3/uL   Urine Drug Screen Panel     Status: Normal   Result Value Ref Range    Amphetamines Urine Screen Negative Screen Negative    Barbituates Urine Screen Negative Screen Negative    Benzodiazepine Urine Screen Negative Screen Negative    Cannabinoids Urine Screen Negative Screen Negative    Cocaine Urine Screen Negative Screen Negative    Fentanyl Qual Urine Screen Negative Screen Negative    Opiates Urine Screen Negative Screen Negative    PCP Urine Screen Negative Screen Negative   Alcohol breath test POCT     Status: Abnormal   Result Value Ref Range    Alcohol Breath Test 0.038 (A) 0.00 - 0.01   CBC with platelets differential     Status: None    Narrative    The following orders were created for panel order CBC with platelets differential.  Procedure                               Abnormality         Status                     ---------                               -----------         ------                     CBC with platelets and d...[[id number]]                      Final result                 Please view results for these tests on the individual orders.   Urine Drug Screen     Status: Normal    Narrative    The following orders were created for panel order Urine Drug Screen.  Procedure                               Abnormality         Status                     ---------                               -----------         ------                     Urine Drug Screen Panel[[id number]]      Normal              Final result                  Please view results for these tests on the individual orders.     Medications   diazepam (VALIUM) tablet 5-20 mg (10 mg Oral $Given 2/26/24 2016)   thiamine (B-1) tablet 100 mg (100 mg Oral $Given 2/26/24 1544)   folic acid (FOLVITE) tablet 1 mg (1 mg Oral $Given 2/26/24 1543)   multivitamin w/minerals (THERA-VIT-M) tablet 1 tablet (1 tablet Oral $Given 2/26/24 1543)   alum & mag hydroxide-simethicone (MAALOX) suspension 30 mL (has no administration in time range)   senna-docusate (SENOKOT-S/PERICOLACE) 8.6-50 MG per tablet 1 tablet (has no administration in time range)   traZODone (DESYREL) tablet 50 mg (50 mg Oral $Given 2/26/24 2152)   hydrOXYzine HCl (ATARAX) tablet 25 mg (25 mg Oral $Given 2/26/24 2149)   atenolol (TENORMIN) tablet 50 mg (50 mg Oral $Given 2/26/24 2149)   ondansetron (ZOFRAN) tablet 4 mg (has no administration in time range)   loperamide (IMODIUM) capsule 2 mg (has no administration in time range)   ibuprofen (ADVIL/MOTRIN) tablet 400 mg (400 mg Oral $Given 2/26/24 2149)   nicotine (NICODERM CQ) 14 MG/24HR 24 hr patch 1 patch (has no administration in time range)   nicotine polacrilex (NICORETTE) gum 4 mg (4 mg Buccal $Given 2/26/24 2152)   ondansetron (ZOFRAN ODT) ODT tab 4 mg (4 mg Oral $Given 2/26/24 1551)     Labs Ordered and Resulted from Time of ED Arrival to Time of ED Departure   COMPREHENSIVE METABOLIC PANEL - Abnormal       Result Value    Sodium 134 (*)     Potassium 4.3      Carbon Dioxide (CO2) 27      Anion Gap 12      Urea Nitrogen 14.6      Creatinine 0.99      GFR Estimate >90      Calcium 9.5      Chloride 95 (*)     Glucose 113 (*)     Alkaline Phosphatase 69       (*)      (*)     Protein Total 7.8      Albumin 4.8      Bilirubin Total 0.5     ALCOHOL BREATH TEST POCT - Abnormal    Alcohol Breath Test 0.038 (*)    MAGNESIUM - Normal    Magnesium 1.9     URINE DRUG SCREEN PANEL - Normal    Amphetamines Urine Screen Negative      Barbituates Urine Screen  Negative      Benzodiazepine Urine Screen Negative      Cannabinoids Urine Screen Negative      Cocaine Urine Screen Negative      Fentanyl Qual Urine Screen Negative      Opiates Urine Screen Negative      PCP Urine Screen Negative     CBC WITH PLATELETS AND DIFFERENTIAL    WBC Count 8.2      RBC Count 5.07      Hemoglobin 16.6      Hematocrit 46.2      MCV 91      MCH 32.7      MCHC 35.9      RDW 12.8      Platelet Count 231      % Neutrophils 77      % Lymphocytes 13      % Monocytes 9      % Eosinophils 0      % Basophils 1      % Immature Granulocytes 0      NRBCs per 100 WBC 0      Absolute Neutrophils 6.3      Absolute Lymphocytes 1.1      Absolute Monocytes 0.8      Absolute Eosinophils 0.0      Absolute Basophils 0.0      Absolute Immature Granulocytes 0.0      Absolute NRBCs 0.0       No orders to display          Critical care was not performed.     Medical Decision Making  The patient's presentation was of high complexity (a chronic illness severe exacerbation, progression, or side effect of treatment).    The patient's evaluation involved:  review of external note(s) from 3+ sources (multiple clinc and ED)  ordering and/or review of 3+ test(s) in this encounter (see separate area of note for details)  discussion of management or test interpretation with another health professional (detox)    The patient's management necessitated high risk (a decision regarding hospitalization).    Assessment & Plan    50yo M pmhx thyroid disease and alcohol abuse presents requesting detox.  No history of alcohol withdrawal seizures or DTs.  Drinking daily for the last ~10 days.  Unable to quantify alcohol intake as he buys different volumes when going to the liquor store but as of late has been purchasing multiple airplane bottles of hard liquor at a time, making up to 5 trips to the liquor store daily.  Last drink was roughly 7.5 hours ago.  At present patient feels that he is withdrawing with tremors, generalized  abdominal pain, anxiety and vomiting.  Denies SI or HI.    In ED patient is found to be mildly tachycardic, have fine hand tremors and tongue fasciculations.  He is calm, and cooperative appearing clinically sober. Breathalyzer .038.    Detox screening labs were obtained and unremarkable. Pt medically clear for detox.     --    ED Attending Physician Attestation    I TRISTAN RUSSELL MD, MD, cared for this patient with the Advanced Practice Provider (ROWENA). I have performed a history and physical examination of the patient independent of the ROWENA. I reviewed the ROWENA's documentation above and agree with the documented findings and plan of care. I personally provided a substantive portion of the care for this patient, including the complete Medical Decision Making. Please see the ROWENA's documentation for full details.    Summary of HPI, PE, ED Course   Patient is a 49 year old male evaluated in the emergency department for alcohol detox. Exam and ED course notable for heart regular rate and rhythm without murmur.  Abdomen soft and nontender.  Labs remarkable for mild transaminitis. After the completion of care in the emergency department, the patient was admitted to inpatient.    TRISTAN RUSSELL MD, MD  Emergency Medicine      I have reviewed the nursing notes. I have reviewed the findings, diagnosis, plan and need for follow up with the patient.    Current Discharge Medication List          Final diagnoses:   Alcohol abuse with withdrawal (H)     Chart documentation was completed with Dragon voice-recognition software. Even though reviewed, this chart may still contain some grammatical, spelling, and word errors.     Andrea Bustillos PA-C  [facility] EMERGENCY DEPARTMENT  2/26/2024     Andrea Bustillos PA-C  02/26/24 [id number]       Tristan Russell MD  02/26/24 [id number]

## 2024-02-26 NOTE — TELEPHONE ENCOUNTER
S: [facility] , Provider Andrea Roger calling at 4:59 PM with clinical on a 49 year old/Male presenting for alcohol detox. [facility]continue [facility] and daily since then.    B: Pt presents for ETOH detox.   Currently reports drinking unclear number, went to liquor store around 5 times daily buying a handful of bottles each time for the past 11 days.    Patient reports last use was 8 hours ago.  Pt ROSE MARY: .38  Pt  denies hx of DT  Pt  denies hx of seizures. Last seizure: N/A  Pt endorsing the following symptoms of withdrawal: Tremulous and Tachycardic  MSSA Score: not collected at time of clinical    Pt denies acute mental health or medical concerns.   Pt denies other drug use: None Amount/frequency: N/A    Does Pt have a detox care plan in [facility]? No  Does pt present with specific needs, assistive devices, or exclusionary criteria? None  Is the patient ambulating, eating and drinking in the ED? Yes    A: Pt meets criteria to be presented for IP detox admission. Patient is voluntary    COVID Symptoms: No  If yes, COVID test required   Utox: Ordered, not yet collected  Magnesium: WNL  CMP: Abnormalities: Sodium 134, Chloride 95, Glucose 113, ,   CBC: WNL  HCG: N/A     R: Patient cleared and ready for behavioral bed placement: Yes    Pt is meeting criteria for presentation to 3A/CD    Does Patient need a Transfer Center request created? No, Pt is located within [facility] ED, [facility] ED, or [facility]

## 2024-02-27 LAB
CHOLEST SERPL-MCNC: 139 MG/DL
GGT SERPL-CCNC: 51 U/L (ref 8–61)
HDLC SERPL-MCNC: 37 MG/DL
LDLC SERPL CALC-MCNC: ABNORMAL MG/DL
NONHDLC SERPL-MCNC: 102 MG/DL
TRIGL SERPL-MCNC: 432 MG/DL
TSH SERPL DL<=0.005 MIU/L-ACNC: 2.67 UIU/ML (ref 0.3–4.2)

## 2024-02-27 PROCEDURE — 250N000013 HC RX MED GY IP 250 OP 250 PS 637: Performed by: PHYSICIAN ASSISTANT

## 2024-02-27 PROCEDURE — 99223 1ST HOSP IP/OBS HIGH 75: CPT | Mod: AI | Performed by: NURSE PRACTITIONER

## 2024-02-27 PROCEDURE — 84443 ASSAY THYROID STIM HORMONE: CPT | Performed by: NURSE PRACTITIONER

## 2024-02-27 PROCEDURE — 128N000004 HC R&B CD ADULT

## 2024-02-27 PROCEDURE — 99254 IP/OBS CNSLTJ NEW/EST MOD 60: CPT | Performed by: PHYSICIAN ASSISTANT

## 2024-02-27 PROCEDURE — 36415 COLL VENOUS BLD VENIPUNCTURE: CPT | Performed by: NURSE PRACTITIONER

## 2024-02-27 PROCEDURE — 82977 ASSAY OF GGT: CPT | Performed by: NURSE PRACTITIONER

## 2024-02-27 PROCEDURE — 82465 ASSAY BLD/SERUM CHOLESTEROL: CPT | Performed by: NURSE PRACTITIONER

## 2024-02-27 PROCEDURE — 250N000013 HC RX MED GY IP 250 OP 250 PS 637: Performed by: NURSE PRACTITIONER

## 2024-02-27 PROCEDURE — HZ2ZZZZ DETOXIFICATION SERVICES FOR SUBSTANCE ABUSE TREATMENT: ICD-10-PCS | Performed by: NURSE PRACTITIONER

## 2024-02-27 RX ORDER — TRAZODONE HYDROCHLORIDE 50 MG/1
50 TABLET, FILM COATED ORAL AT BEDTIME
Status: DISCONTINUED | OUTPATIENT
Start: 2024-02-27 | End: 2024-02-29 | Stop reason: HOSPADM

## 2024-02-27 RX ORDER — ONDANSETRON 4 MG/1
4 TABLET, ORALLY DISINTEGRATING ORAL EVERY 6 HOURS PRN
Status: DISCONTINUED | OUTPATIENT
Start: 2024-02-27 | End: 2024-02-29 | Stop reason: HOSPADM

## 2024-02-27 RX ORDER — NICOTINE 21 MG/24HR
1 PATCH, TRANSDERMAL 24 HOURS TRANSDERMAL DAILY
Status: DISCONTINUED | OUTPATIENT
Start: 2024-02-27 | End: 2024-02-29 | Stop reason: HOSPADM

## 2024-02-27 RX ORDER — VENLAFAXINE 75 MG/1
75 TABLET ORAL 2 TIMES DAILY WITH MEALS
Status: DISCONTINUED | OUTPATIENT
Start: 2024-02-27 | End: 2024-02-27

## 2024-02-27 RX ORDER — LEVOTHYROXINE SODIUM 75 UG/1
75 TABLET ORAL DAILY
Status: DISCONTINUED | OUTPATIENT
Start: 2024-02-27 | End: 2024-02-29 | Stop reason: HOSPADM

## 2024-02-27 RX ORDER — TRAZODONE HYDROCHLORIDE 50 MG/1
50 TABLET, FILM COATED ORAL
Status: DISCONTINUED | OUTPATIENT
Start: 2024-02-27 | End: 2024-02-29 | Stop reason: HOSPADM

## 2024-02-27 RX ORDER — VENLAFAXINE HYDROCHLORIDE 150 MG/1
150 CAPSULE, EXTENDED RELEASE ORAL
Status: DISCONTINUED | OUTPATIENT
Start: 2024-02-27 | End: 2024-02-28

## 2024-02-27 RX ADMIN — Medication 1 TABLET: at 08:52

## 2024-02-27 RX ADMIN — TRAZODONE HYDROCHLORIDE 50 MG: 50 TABLET ORAL at 22:00

## 2024-02-27 RX ADMIN — HYDROXYZINE HYDROCHLORIDE 25 MG: 25 TABLET, FILM COATED ORAL at 12:00

## 2024-02-27 RX ADMIN — VENLAFAXINE HYDROCHLORIDE 150 MG: 150 CAPSULE, EXTENDED RELEASE ORAL at 08:52

## 2024-02-27 RX ADMIN — FOLIC ACID 1 MG: 1 TABLET ORAL at 08:52

## 2024-02-27 RX ADMIN — THIAMINE HCL TAB 100 MG 100 MG: 100 TAB at 08:52

## 2024-02-27 RX ADMIN — NICOTINE 1 PATCH: 21 PATCH, EXTENDED RELEASE TRANSDERMAL at 08:56

## 2024-02-27 RX ADMIN — DIAZEPAM 10 MG: 5 TABLET ORAL at 20:19

## 2024-02-27 RX ADMIN — HYDROXYZINE HYDROCHLORIDE 25 MG: 25 TABLET, FILM COATED ORAL at 16:34

## 2024-02-27 RX ADMIN — LEVOTHYROXINE SODIUM 75 MCG: 75 TABLET ORAL at 08:51

## 2024-02-27 ASSESSMENT — ACTIVITIES OF DAILY LIVING (ADL)
ADLS_ACUITY_SCORE: 42
HYGIENE/GROOMING: INDEPENDENT
ADLS_ACUITY_SCORE: 42
ORAL_HYGIENE: INDEPENDENT
ADLS_ACUITY_SCORE: 42
DRESS: INDEPENDENT
ADLS_ACUITY_SCORE: 42
ADLS_ACUITY_SCORE: 42

## 2024-02-27 NOTE — PLAN OF CARE
"Goal Outcome Evaluation:    Problem: Alcohol Withdrawal  Goal: Alcohol Withdrawal Symptom Control  Outcome: Progressing     3AW Admission Note      S = Situation:   Jose Alberto Cuellar is a 49 year old year old male with a chief complaint of Alcohol Problem    Voluntary admission to 3AW for alcohol withdrawal and detox.    B  = Background:   Pt reports drinking unclear number of liquor. Last use was 8 hours before going to ED. ROSE MARY was 0.038 at 16:57. Denies hx of seizure & DT. Pt has hx.of Thyroid disease and alcohol abuse. Pt discharged from [facility] on the 15th, has been drinking daily since then. Plan to go either to treatment, then go a sober house, or go straight to sober house.         A  =  Assessment:   During admission interview, pt affect was flat, anxious mood. Pt endorsed anxiety 9/10, depression 9/10, headache 8/10, PRN Ibuprofen given the headache & hydroxyzine for anxiety, pt was a sleep during reassessment. Pt's MSSA was 7. PRN Atenolol was given for HR of 103. BP was high, pt had just received PRN Valium just before coming, staff will continue to monitor.     BP (!) 186/109 (BP Location: Left arm, Patient Position: Sitting, Cuff Size: Adult Large)   Pulse 103   Temp 98.4  F (36.9  C) (Oral)   Resp 18   Ht 1.753 m (5' 9\")   Wt 99.8 kg (220 lb)   SpO2 97%   BMI 32.49 kg/m       R =   Request or Recommendation:   Alcohol withdrawal will be monitored and treated using MSSA with valium.  Pt will meet with psychiatry, internal medicine, and case management tomorrow.                                   "

## 2024-02-27 NOTE — CONSULTS
Internal Medicine Initial Visit      Jose Alberto Cuellar MRN# [id number]   YOB: 1975 Age: 49 year old   Date of Admission: 2/26/2024  PCP: System, Provider Not In    Referring Provider: Behavioral Health - Angel Bauer MD  Reason for Visit: General Medical Evaluation, EtOH intoxication and withdrawal.          Assessment and Recommendations:   Jose Alberto Cuellar is a 49M w/ history of EtOH dependence, tobacco use, thyroid disease who is admitted to station 3a with EtOH intoxication and withdrawal.        EtOH intoxication and withdrawal.   Drinking 1.75L of hard alcohol per day. Last drink prior to admission. Tox screen: breath EtOH 0.038, urine tox neg. no history of withdrawal seizures and DTs. MSSA currently 4 and 4. LFTs elevated as per below.    - Continue on MSSA protocol with benzodiazepines as indicated   - Seizure precautions   - Management per Psychiatry   - Agree with MVI, folic acid, and thiamine supplements   - Notify medicine for SBP >180 or DBP >110    Trace hyponatremia  Suspect his renal losses secondary to EtOH ingestion- Na 134  - encourage oral hydration    Transaminitis  Likely EtOH toxicity and 1:1 pattern could suggest hepatic steatosis.  CT abdomen pelvis with IV contrast on 4/24/2022 noted fatty infiltration of the liver  -Limit hepatotoxins as able  -Limit acetaminophen to 2 g/day  -Encourage EtOH cessation    Incidental small umbilical hernia-fat-containing noted on prior Ct-monitor    Bilateral nonobstructing nephrolithiasis-noted on prior Ct-monitor    Degenerative disc disease-noted on CT abdomen pelvis especially at the lumbosacral space-monitor    Hypertriglyceridemia  Dyslipidemia  Admission cholesterol panel with elevated triglycerides and low HDL in the setting of acute EtOH toxicity, unclear if fasting laboratories  -Encourage EtOH cessation is likely contributory to elevated triglyceride  -Follow-up with primary care provider for nonurgent repeat  lipid panel    Thyroid disease-TSH is normal here    Medicine will sign off, please page with any additional concerns.     Nemo Álvarez PA-C  Hospitalist Service   Pager:   [facility] Paging/Directory     Reason for Admission:   EtOH intoxication and withdrawal.          History of Present Illness:   History is obtained from the patient and medical record.     Jose Alberto Cuellar is a 49M w/ history of EtOH dependence, tobacco use, thyroid disease who is admitted to station 3a with EtOH intoxication and withdrawal.  Patient was drinking approximately 1.75 L vodka per day since his last discharge from detox.  Patient has never had a seizure or delirium tremens.    Internal Medicine service was asked to see patient for a general medication evaluation. Currently, patient notes that he is feeling tired but otherwise generally improved versus yesterday when he presented.  Continues to have some mild nausea but was able to tolerate eating breakfast.  Patient endorses having vomiting previously.    He is not noticing any visual changes or headaches.  No neuropathy or changes in sensation.  No urinary issues.  Patient has not had any blood in the stool or bowel issues.  No reports of chest pain, shortness of breath, palpitations, swelling, rashes, sore throat, cough, other complaints.          Review of Systems:       10 point ROS was performed and negative unless otherwise noted in HPI.           Past Medical History:   Reviewed and updated in Epic.  Alcohol use disorder  Lumbosacral degenerative disc disease  Nephrolithiasis            Past Surgical History:   Reviewed and updated in Epic.  No surgeries of record          Social History:     Social History     Tobacco Use    Smoking status: Every Day     Types: Cigarettes    Smokeless tobacco: Never   Vaping Use    Vaping Use: Never used   Substance Use Topics    Alcohol use: Yes     Comment: Vodka    Drug use: Not Currently             Family History:   Reviewed and  "updated in Epic.  History reviewed. No pertinent family history.          Allergies:     Allergies   Allergen Reactions    Penicillins Angioedema and Rash     Converted from Drug Class Allergy: Penicillins             Medications:     Medications Prior to Admission   Medication Sig Dispense Refill Last Dose    folic acid (FOLVITE) 1 MG tablet Take 1 tablet (1 mg) by mouth daily 30 tablet 0 Past Week    levothyroxine (SYNTHROID/LEVOTHROID) 75 MCG tablet Take 75 mcg by mouth daily   2/26/2024 at AM    ondansetron (ZOFRAN ODT) 4 MG ODT tab Take 4 mg by mouth every 8 hours as needed for nausea or vomiting       traZODone (DESYREL) 50 MG tablet Take 1 tablet (50 mg) by mouth at bedtime 30 tablet 0 2/24/2024 at PM    venlafaxine (EFFEXOR) 37.5 MG tablet TAKE 1 TABLET(37.5 MG) BY MOUTH TWICE DAILY WITH MEALS 60 tablet 0 2/26/2024 at AM        Current Facility-Administered Medications   Medication    alum & mag hydroxide-simethicone (MAALOX) suspension 30 mL    atenolol (TENORMIN) tablet 50 mg    diazepam (VALIUM) tablet 5-20 mg    folic acid (FOLVITE) tablet 1 mg    hydrOXYzine HCl (ATARAX) tablet 25 mg    ibuprofen (ADVIL/MOTRIN) tablet 400 mg    levothyroxine (SYNTHROID/LEVOTHROID) tablet 75 mcg    loperamide (IMODIUM) capsule 2 mg    multivitamin w/minerals (THERA-VIT-M) tablet 1 tablet    nicotine (NICODERM CQ) 14 MG/24HR 24 hr patch 1 patch    nicotine polacrilex (NICORETTE) gum 4 mg    ondansetron (ZOFRAN ODT) ODT tab 4 mg    senna-docusate (SENOKOT-S/PERICOLACE) 8.6-50 MG per tablet 1 tablet    thiamine (B-1) tablet 100 mg    traZODone (DESYREL) tablet 50 mg    traZODone (DESYREL) tablet 50 mg    venlafaxine (EFFEXOR) tablet 75 mg            Physical Exam:   Blood pressure 135/85, pulse 51, temperature 97.4  F (36.3  C), temperature source Temporal, resp. rate 18, height 1.753 m (5' 9\"), weight 99.8 kg (220 lb), SpO2 95%.  Body mass index is 32.49 kg/m .  GENERAL: Alert and oriented x 3. NAD, repositions " "independently, cooperative  HEENT: Anicteric sclera.  Pupils equal and round. EOMI. NC. AT.  CV: RRR. S1, S2. No murmurs appreciated.   RESPIRATORY: Effort normal on room air. Lungs CTAB with no wheezing, rales, rhonchi.   GI: Abdomen soft, non distended, non tender.  NABS.  MUSCULOSKELETAL: No joint swelling or tenderness.  NEUROLOGICAL: No focal deficits. Moves all extremities.   EXTREMITIES: No peripheral edema. Intact bilateral pedal pulses.   SKIN: No jaundice. No rashes on exposed skin.           Data:   CBC:  Recent Labs   Lab Test 02/26/24  1547   WBC 8.2   RBC 5.07   HGB 16.6   HCT 46.2   MCV 91   MCH 32.7   MCHC 35.9   RDW 12.8          CMP:  Recent Labs   Lab Test 02/26/24  1547   *   POTASSIUM 4.3   CHLORIDE 95*   ANTWON 9.5   CO2 27   BUN 14.6   CR 0.99   *   *   *   BILITOTAL 0.5   ALBUMIN 4.8   PROTTOTAL 7.8   ALKPHOS 69       TSH:  No results found for: \"TSH\"    Tox screen: breath EtOH 0.038, urine tox neg  HCG: n/a    Unresulted Labs Ordered in the Past 30 Days of this Admission       No orders found for last 31 day(s).               "

## 2024-02-27 NOTE — PLAN OF CARE
Goal Outcome Evaluation:    Problem: Alcohol Withdrawal  Goal: Alcohol Withdrawal Symptom Control  2/27/2024 0610 by Trupti Smith RN  Outcome: Progressing    Pt's MSSA was 4 & 4, no PRN Valium given this shift. Slept with a snore.

## 2024-02-27 NOTE — DISCHARGE INSTRUCTIONS
Behavioral Discharge Planning and Instructions  THANK YOU FOR CHOOSING [facility]  3A  [phone]    Summary: You were admitted to [facility] on 2/29/24  for detoxification from Alcohol .  A medical exam was performed that included lab work. You have met with a  and opted to discharge to home. Please take care and make your recovery a daily priority, Jose Alberto!  It was a pleasure working with you and the entire treatment team here wishes you the very best in your recovery!       Recommendation:  Build a healthy and stable lifestyle. Attend IOP groups and develop a positive support group. Return to [place] if a higher level of care is needed    Main Diagnoses:  Per Dr Bauer  Alcohol use disorder, severe, dependence  Alcohol withdrawal  Nicotine use disorder  History of polysubstance abuse  Generalized anxiety disorder  Major depressive disorder, moderate, versus alcohol-induced depressive disorder      Major Treatments, Procedures and Findings:  You were treated for Alcohol  detoxification using Valium . As an outpatient you will be prescribed Antabuse , please take this medication as prescribed until it is gone. You did not complete a chemical dependency assessment. You had labs drawn and those results were reviewed with you. Please take a copy of your lab work with you to your next primary care provider appointment.    Symptoms to Report:  If you experience more anxiety, confusion, sleeplessness, deep sadness or thoughts of suicide, notify your treatment team or notify your primary care provider. IF ANY OF THE SYMPTOMS YOU ARE EXPERIENCING ARE A MEDICAL EMERGENCY CALL 911 IMMEDIATELY.     Lifestyle Adjustment: Adjust your lifestyle to get enough sleep, relaxation, exercise and good nutrition. Continue to develop healthy coping skills to decrease stress and promote a sober living environment. Do not use mood altering substances including alcohol, illegal drugs or addictive medications other than  what is currently prescribed.     Disposition: Home    Facts about COVID19 at www.cdc.gov/COVID19 and www.[place].gov/covid19    Keeping hands clean is one of the most important steps we can take to avoid getting sick and spreading germs to others.  Please wash your hands frequently and lather with soap for at least 20 seconds!    Follow-up Appointment:   Pt declined to make follow up appointment   Patient declined any assistance from Case Management in scheduling therapy and/or psychiatry.      Recovery apps for your phone to locate current in person and zoom recovery meetings  [place] - meeting migue  AA  - meeting migue  Meeting guide - meeting migue  Quick NA meeting - meeting migue  Jose- has various apps    Resources:  Some AA/NA meetings are being held online however most have returned to in-person or a hybrid combination please check online to verify time.  Need Support Now? If you or someone you know is struggling or in crisis, help is available. Call or text [employer] or chat [employer].org  AA meetings search for them at: https://aa-intergroup.org (worldwide meeting listings)  AA meetings for [place] area can be found online at: https://a[place].org (click local online meetings listings)  NA meetings for [place] area can be found online at: https://www.nam[place].org  (click find a meeting)  AA and NA Sponsors are excellent resources for support and you can find one at any support group meeting.   Alcoholics Anonymous (https://aa.org/): for information 24 hours/day  AA Intergroup service office in [place] (http://www.aastpaul.org/) [phone]  AA Intergroup service office in [facility]: [phone]. (http://www.a[place].org/)  Narcotics Anonymous (www.nam[place].org) (608) 208-9453  https://aafairviewriverside.org/meetings  SMART Recovery - self management for addiction recovery:  www.smartrecovery.org  Pathways ~ A Health Crisis Resource & Support Center:   "563.356.1045.  https://prescribetoprevent.org/patient-education/videos/  http://www.harmreduction.org  Crisis Text Line  Text 051513  You will be connected with a trained live crisis counselor to provide support. Por erikanol, texto  FERNANDO a 298034 o texto a 442-AYUDAME en WhatsApp  [place] Hope Line  1.800.SUICIDE [5680556]  [facility] 403-347-1831  Support Group:  AA/NA and Sponsor/support.  Fast Tracker  Linking people to mental health and substance use disorder resources  Act[employer]Regalos [employer] Amigosn.Fav[employer]   [place] Mental Health Warm Line  Peer to peer support  Monday thru Saturday, 12 pm to 10 pm  503.443.8233 or 2.908.057.9447  Text \"Support\" to 45078  National [place] on Mental Illness (CHRISTY)  283.398.6310 or 1.888.CHRISTY.HELPS  Alcoholics Anonymous (www.alcoholics-anonymous.org): Check your phone book for your local chapter.  Suicide Awareness Voices of Education (SAVE) (www.save.org): 693-240-WSQY (7283)  National Suicide Prevention Line (www.mentalhealthmn.org): 365-031-AWQV (1959)  Mental Health Consumer/Survivor Network of [place] (www.mhcsn.net): 463.435.8107 or 132-115-1103  Mental Health Association of [place] (www.mentalhealth.org): 478.646.6949 or 351-712-2656   Substance Abuse and Mental Health Services (www.samhsa.gov)  [place] Opioid Prevention Coalition: www.opioidcoalition.org    [place] Recovery Connection (MRC)  [facility] connects people seeking recovery to resources that help foster and sustain long-term recovery.  Whether you are seeking resources for treatment, transportation, housing, job training, education, health care or other pathways to recovery, [facility] is a great place to start.  659.735.6836.  www.[place]arecovery.org    Great Pod casts for nutrition and wellness  Listen on Apple Podcasts  Dishing Up Nutrition   [employer]   Nutrition       Understand the connection between what you eat and how you feel. Hosted by licensed nutritionists and dietitians from " Nutritional Weight [employer] Wellness we share practical, real-life solutions for healthier living through nutrition.     General Medication Instructions:   See your medication sheet(s) for instructions.   Take all medications as prescribed.  Make no changes unless your primary care provider suggests them.   Go to all your primary care provider visits.  Be sure to have all your required lab tests. This way, your medicines can be refilled on time.  Do not use any forms of alcohol.    Please Note:  If you have any questions at anytime after you are discharged please call [facility] detox unit 3AW at [phone].  [facility], Behavioral Intake [phone]  Medical Records call [phone]  Outpatient Behavioral Intake call [phone]  LP+ Wait List/Bed Availability call [phone]    Please remember to take all of your behavioral discharge planning and lab paperwork to any follow up appointments, it contains your lab results, diagnosis, medication list and discharge recommendations.      THANK YOU FOR CHOOSING [facility]

## 2024-02-27 NOTE — PLAN OF CARE
Problem: Alcohol Withdrawal  Goal: Alcohol Withdrawal Symptom Control  Outcome: Progressing  Goal Outcome Evaluation:      Patient admitted to unit for ETOH detox. MSSA scores during day were 1&5, no withdrawal medication given this shift. Patient endorses anxiety, rating it an 8/10, and was given PRN Hydroxyzine. Patient endorses depression rating it an 8/10, denies SI/SIB.  Patient has been visible in lounge socializing with peers. Calm and cooperative throughout shift.

## 2024-02-27 NOTE — PROGRESS NOTES
02/26/24 2055   Patient Belongings   Did you bring any home meds/supplements to the hospital?  Yes   Disposition of meds  Sent to security/pharmacy per site process   Patient Belongings other (see comments)   Belongings Search Yes   Clothing Search Yes   Second Staff Abdo     Storage bin: boots, jacket, hat, belt, Ziploc with cigs, matches, gum, cookie    Box in med room: phone, wallet with chain and car key    Security env # [id number]: meds    Security env # [id number]: 2 visa cards, [employer] card,  Discover card,  3 Mastercards    A               ADMISSION:    I am responsible for any personal items that are not sent to the safe or pharmacy. [facility] is not responsible for loss, theft or damage of any property in my possession.    Patient Signature _________________________________________ Date/Time _____________________    Staff Signature ___________________________________________ Date/Time _____________________    [facility] Staff person, if patient is unable/unwilling to sign    ________________________________________________________ Date/Time _____________________    DISCHARGE:    All personal items have been returned to me.    Patient Signature _________________________________________ Date/Time _____________________    Staff Signature ___________________________________________ Date/Time _____________________

## 2024-02-27 NOTE — H&P
History and Physical    Jose Alberto Cuellar MRN# [id number]   Age: 49 year old YOB: 1975     Date of Admission:  2/26/2024          Contacts:     PCP - CAROLYN Whatley, CNP - [facility] (appointment 3/13/2024 at 0920)         Diagnoses:     Alcohol use disorder, severe, dependence  Alcohol withdrawal  Nicotine use disorder  History of polysubstance abuse  Generalized anxiety disorder  Major depressive disorder, moderate, versus alcohol-induced depressive disorder         Recommendations:     Admit to Unit: [address] [place] [address]    Attending Physician: Dr. Bauer, under the direct care of Veronica Sims NP    Patient is voluntary.    Routine lab studies have been requested.    Monitor for target symptoms.     Provide a safe environment and therapeutic milieu.     Internal medicine consult.    MSSA with Valium.    Medications:  Change Effexor to XR formulation 150 mg daily and will likely plan to increase.  Continue Trazodone 50 mg at HS.  PRN Hydroxyzine is available for anxiety.     He is interested in sober housing and would be open to residential treatment prior to this.        Attestation:  Patient has been seen and evaluated by me, Nesha Sims, APRN CNP  The patient was counseled on nature of illness and treatment plan/options  Care was coordinated with treatment team  Total time > 75 minutes         Chief Complaint:     History is obtained from the patient and electronic health record.  ED notes, outpatient records and records from his recent hospitalization were reviewed.    Alcohol withdrawal.         History of Present Illness:          Jose Alberto Cuellar is a 49-year-old male admitted to [address] on 2/26/2024, arriving on the unit 2/27/2024.  He was admitted as a voluntary patient through the ED due to alcohol use disorder and withdrawal.  He was hospitalized on a medical unit for detox 1/9/2024 - 1/12/2024, at which time he had been consuming 1.75 mL of  "liquor daily.  After his discharge from the medical unit, he went home for a few days, relapsed, and then went to [facility] after maintaining 24 hours of sobriety.  He completed the program.  He relapsed immediately after his discharge home on 2/15/2024.  He said he didn't want to buy a large bottle of liquor, so he was making several trips to the liquor store buying travel sized bottles.  BAL was 0.038.  UTOX was negative.  He was taking his medications most of the time.           Psychiatric Review of Systems:     His mood is \"bad.\"  He feels \"depressed, stressed out, thoughts of being a loser.\"  He denies suicidal ideation.  He has anhedonia.  His concentration is \"not great.\"  He has low energy and low motivation.  His appetite has been low.  \"I've been throwing up for a week.\"  He reports that he passes out to sleep every night.  He has feelings of hopelessness, worthlessness and guilt.  Anxiety has been high.  He feels irritable.  He has racing thoughts.  He feels restless.  He is unsure about the source of his worries and states he tries not to think about anything at all.  He denies panic attacks.  He denies symptoms consistent with tamica, psychosis, OCD and PTSD.  He denies gambling.  He denies homicidal ideation.            Medical Review of Systems:     He feels nauseated.  A 10-point review of systems was completed and is otherwise negative with the exception of HPI.           Psychiatric History:     He has a history of depression and anxiety.  He often feels depressed during winter.  He has no history of psychiatric hospitalizations.  He denies any history of suicide attempts, though stated that in the past he would consume large amounts of alcohol and then put his head between the couch cushions and hope he would vomit and suffocate.   In the past he has taken Zoloft 100 mg and is unsure whether it was beneficial.  \"Working out, being outside, sleeping all does way better than the antidepressants " "do.\"   He has never seen a therapist.             Substance Use History:     Alcohol use became problematic when he was about 20.  He reports progressive use, loss of control, continued use in spite of consequences, tolerance, withdrawal, cravings and consuming more and longer than intended. He denies any history of withdrawal seizures or DTs.  Longest period of sobriety is 2 years.  He has never taken Campral, Naltrexone or Antabuse.  He has a history of attending .  He has a history of treatment at [facility] as well as a few outpatient treatments.  He smokes 1 pack of cigarettes daily.   He reports he used to do \"a lot of drugs, I quit when I was 38.\"  These include cocaine, meth and opioids.  He denies any history of IV drug use.  He snorted what he assumes was Fentanyl with a neighbor, resulting in an unintentional opioid overdose 12/31/2023.           Past Medical History:     Hypothyroidism  Hypogonadism  Hyperlipidemia  Hypertension    No history of seizures or head injuries.           Past Surgical History:     None         Allergies:       Penicillins Angioedema and Rash          Medications:      folic acid (FOLVITE) 1 MG tablet Take 1 tablet (1 mg) by mouth daily    levothyroxine (SYNTHROID/LEVOTHROID) 75 MCG tablet Take 75 mcg by mouth daily    ondansetron (ZOFRAN ODT) 4 MG ODT tab Take 4 mg by mouth every 8 hours as needed for nausea or vomiting    traZODone (DESYREL) 50 MG tablet Take 1 tablet (50 mg) by mouth at bedtime    venlafaxine (EFFEXOR) 37.5 MG tablet TAKE 1 TABLET(37.5 MG) BY MOUTH TWICE DAILY WITH MEALS          Social History:     He grew up in [place], [place].  He was raised by his parents.  He denies any history of abuse.  Highest level of education is some college.  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  He lives alone in a rental property.  He does not have children.  He denies any history of legal issues.            " Family History:     His sister has a history of substance abuse and is in recovery.  His father has a history of depression.  No known family history of suicides.           Labs:      Latest Reference Range & Units 02/26/24 15:47 02/26/24 16:57 02/26/24 17:36 02/27/24 07:29   Sodium 135 - 145 mmol/L 134 (L)      Potassium 3.4 - 5.3 mmol/L 4.3      Chloride 98 - 107 mmol/L 95 (L)      Carbon Dioxide (CO2) 22 - 29 mmol/L 27      Urea Nitrogen 6.0 - 20.0 mg/dL 14.6      Creatinine 0.67 - 1.17 mg/dL 0.99      GFR Estimate >60 mL/min/1.73m2 >90      Calcium 8.6 - 10.0 mg/dL 9.5      Anion Gap 7 - 15 mmol/L 12      Magnesium 1.7 - 2.3 mg/dL 1.9      Albumin 3.5 - 5.2 g/dL 4.8      Protein Total 6.4 - 8.3 g/dL 7.8      Alkaline Phosphatase 40 - 150 U/L 69      ALT 0 - 70 U/L 115 (H)      AST 0 - 45 U/L 108 (H)      Bilirubin Total <=1.2 mg/dL 0.5      Cholesterol <200 mg/dL    139   GGT 8 - 61 U/L    51   Glucose 70 - 99 mg/dL 113 (H)      HDL Cholesterol >=40 mg/dL    37 (L)   LDL Cholesterol Calculated     See Comment   Non HDL Cholesterol <130 mg/dL    102   Triglycerides <150 mg/dL    432 (H)   TSH 0.30 - 4.20 uIU/mL    2.67   WBC 4.0 - 11.0 10e3/uL 8.2      Hemoglobin 13.3 - 17.7 g/dL 16.6      Hematocrit 40.0 - 53.0 % 46.2      Platelet Count 150 - 450 10e3/uL 231      RBC Count 4.40 - 5.90 10e6/uL 5.07      MCV 78 - 100 fL 91      MCH 26.5 - 33.0 pg 32.7      MCHC 31.5 - 36.5 g/dL 35.9      RDW 10.0 - 15.0 % 12.8      % Neutrophils % 77      % Lymphocytes % 13      % Monocytes % 9      % Eosinophils % 0      % Basophils % 1      Absolute Basophils 0.0 - 0.2 10e3/uL 0.0      Absolute Eosinophils 0.0 - 0.7 10e3/uL 0.0      Absolute Immature Granulocytes <=0.4 10e3/uL 0.0      Absolute Lymphocytes 0.8 - 5.3 10e3/uL 1.1      Absolute Monocytes 0.0 - 1.3 10e3/uL 0.8      % Immature Granulocytes % 0      Absolute Neutrophils 1.6 - 8.3 10e3/uL 6.3      Absolute NRBCs 10e3/uL 0.0      NRBCs per 100 WBC <1 /100 0     "  Alcohol Breath Test 0.00 - 0.01   0.038 !     Amphetamine Qual Urine Screen Negative    Screen Negative    Fentanyl Qual Urine Screen Negative    Screen Negative    Cocaine Urine Screen Negative    Screen Negative    Benzodiazepine Urine Screen Negative    Screen Negative    Opiates Qualitative Urine Screen Negative    Screen Negative    PCP Urine Screen Negative    Screen Negative    Cannabinoids Qual Urine Screen Negative    Screen Negative    Barbiturates Qual Urine Screen Negative    Screen Negative           Psychiatric Examination:     Appearance:  awake, alert, adequately groomed, and dressed in hospital scrubs  Attitude:  cooperative  Eye Contact:  good  Mood:  anxious and depressed  Affect:  appropriate and in normal range  Speech:  clear, coherent  Psychomotor Behavior:  no evidence of tardive dyskinesia, dystonia, or tics  Thought Process:  linear and goal oriented  Associations:  no loose associations  Thought Content:  no evidence of suicidal ideation or homicidal ideation and no evidence of psychotic thought  Insight:  fair  Judgment:  fair  Oriented to:  date, time, person, and place  Attention Span and Concentration:  fair, reports some impairment  Recent and Remote Memory:   fair to good  Language:  intact, fluent English  Fund of Knowledge:  appropriate  Muscle Strength and Tone:  normal  Gait and Station:   normal      /85 (BP Location: Right arm, Patient Position: Supine, Cuff Size: Adult Large)   Pulse 51   Temp 97.4  F (36.3  C) (Temporal)   Resp 18   Ht 1.753 m (5' 9\")   Wt 99.8 kg (220 lb)   SpO2 95%   BMI 32.49 kg/m             Physical Exam:     Please refer to the physical exam completed by Dr. Hubbard in the ED on 2/26/2024:    Constitutional:       General: He is not in acute distress.     Appearance: He is well-developed. He is not diaphoretic.   HENT:      Head: Normocephalic and atraumatic.      Nose: No congestion.      Mouth/Throat:      Mouth: Mucous membranes are " moist.   Eyes:      Conjunctiva/sclera: Conjunctivae normal.   Cardiovascular:      Rate and Rhythm: Normal rate.   Pulmonary:      Effort: Pulmonary effort is normal.   Abdominal:      General: Abdomen is flat.   Musculoskeletal:      Cervical back: Normal range of motion and neck supple.   Skin:     General: Skin is warm and dry.      Capillary Refill: Capillary refill takes less than 2 seconds.      Findings: No rash.   Neurological:      Mental Status: He is alert and oriented to person, place, and time.      Comments: Fine hand tremors noted bilaterally  Fine tongue fasciculations present.

## 2024-02-27 NOTE — PROGRESS NOTES
"[address]     Case Management Encounter: Met with the patient this morning to discuss aftercare plans. The patient talked about how he was in the medical unit earlier this month and discharged from [facility] on the 15th. The patient said he felt \"forced\" last time and listened to everyone else instead of himself.   When challenged on that statement he stated, \"maybe I should start listening to others\".   The patient then started to discuss what was going to be different this time and what his plan was after detox. He stated he was \"unsure\" and wanted some time to think about it.     Insurance: Medicaid     Legal Status: Voluntary     SUDs Assessment Status: Completed on 1/25     ROIs on file: None     Living Situation: Alone     RN updated.    Octavio Perez  [address] - Adult Inpatient Addiction Psychiatry Unit            "

## 2024-02-27 NOTE — PHARMACY-ADMISSION MEDICATION HISTORY
Pharmacy Intern Admission Medication History    Admission medication history is complete. The information provided in this note is only as accurate as the sources available at the time of the update.    Information Source(s): Patient and CareEverywhere/SureScripts via in-person    Pertinent Information:   Per patient, he stopped taking the following medications:  Amlodipine 10 mg tablet  THERA-VIT-M tablet  Nicotine 21mg/24hr patch  Testosterone Cypionate 200 mg/mL injection  Thiamine B-1 100 mg tablet  Triamcinolone 0.5% external cream  Per patient, he was recently prescribed ondansetron ODT 4 mg tablet, but has not started on it yet    Changes made to PTA medication list:  Added:   Ondansetron ODT 4 mg tablet  Deleted:   Amlodipine 10 mg tablet - take 1 tablet PO every day   THERA-VIT-M tablet   Nicotine 21mg/24hr patch - place 1 patch onto skin Q24H   Testosterone Cypionate 200 mg/mL injection - inject 200 mg into the muscle every 14 days  Thiamine B-1 100 mg tablet - take 1 tablet PO every day   Triamcinolone 0.5% external cream - apply topically to affected area BID for 14 days  Changed: None    Allergies reviewed with patient and updates made in EHR: no    Medication History Completed By: Zeyad Deluca 2/26/2024 7:17 PM    PTA Med List   Medication Sig Last Dose    folic acid (FOLVITE) 1 MG tablet Take 1 tablet (1 mg) by mouth daily Past Week    levothyroxine (SYNTHROID/LEVOTHROID) 75 MCG tablet Take 75 mcg by mouth daily 2/26/2024 at AM    ondansetron (ZOFRAN ODT) 4 MG ODT tab Take 4 mg by mouth every 8 hours as needed for nausea or vomiting     traZODone (DESYREL) 50 MG tablet Take 1 tablet (50 mg) by mouth at bedtime 2/24/2024 at PM    venlafaxine (EFFEXOR) 37.5 MG tablet TAKE 1 TABLET(37.5 MG) BY MOUTH TWICE DAILY WITH MEALS 2/26/2024 at AM

## 2024-02-27 NOTE — COMMUNITY RESOURCES LIST (ENGLISH)
02/27/2024   [facility]  N/A  For questions about this resource list or additional care needs, please contact your primary care clinic or care manager.  Phone: [phone]   Email: N/A   Address: [address] [place] [id number]   Hours: N/A        Substance Use       Outpatient substance use treatment  1  [facility] Distance: 0.81 miles      In-Person, Phone/Virtual   [address] [place] [phone]  Language: English  Hours: Mon 9:00 AM - 12:00 PM , Mon 5:00 PM - 8:00 PM , Tue 10:00 AM - 12:00 PM , Tue 5:00 PM - 7:00 PM , Wed - Thu 9:00 AM - 12:00 PM , Wed - Thu 5:00 PM - 8:00 PM  Fees: Insurance, Self Pay   Phone: [phone] Email: [email or web address] Website: [email or web address]     Substance use support group  2  [facility] Distance: 0.81 miles      In-Person, Phone/Virtual   [address] [place] [address]  Language: English  Hours: Mon - Thu 8:00 AM - 8:30 PM  Fees: Free, Insurance, Self Pay   Phone: ([phone]) [phone] Email: [email or web address] Website: [email or web address]          Important Numbers & Websites       Emergency Services   911  [facility] Services   311  Poison Control   (294) 137-3181  Suicide Prevention Lifeline   (955) 289-6834 (TALK)  Child Abuse Hotline   (219) 439-4324 (4-A-Child)  Sexual Assault Hotline   (912) 641-3381 (HOPE)  National Runaway Safeline   (792) 826-3300 (RUNAWAY)  All-Options Talkline   (240) 199-3561  Substance Abuse Referral   (298) 326-5963 (HELP)

## 2024-02-27 NOTE — TELEPHONE ENCOUNTER
R:    6:00 PM Paged provider to review for [facility].    6:12 PM Pt accepted to [facility].    6:40 PM Pt indicia completed, added to admit board, and report info exchanged.

## 2024-02-27 NOTE — PLAN OF CARE
Behavioral Team Discussion: (2/27/2024)    Continued Stay Criteria/Rationale: Patient admitted for  Alcohol Use Disorder.  Plan: The following services will be provided to the patient; psychiatric assessment, medication management, therapeutic milieu, individual and group support, and skills groups.   Participants: 3A Provider: Dr. Angel Bauer MD; [facility] RN: Morris Armstrong RN; [facility] CM's: Cora Rodriguez.  Summary/Recommendation: Providers will assess today for treatment recommendations, discharge planning, and aftercare plans. CM will meet with pt for discharge planning.   Medical/Physical: Patient denies any medical or physical concerns at this time.  Precautions:   Behavioral Orders   Procedures    Code 1 - Restrict to Unit    Routine Programming     As clinically indicated    Status 15     Every 15 minutes.    Withdrawal precautions     Rationale for change in precautions or plan: N/A  Progress: Initial.    ASAM Dimension Scale Ratings:  Dimension 1: 3 Client tolerates and frandy with withdrawal discomfort poorly. Client has severe intoxication, such that the client endangers self or others, or intoxication has not abated with less intensive levels of services. Client displays severe signs and symptoms; or risk of severe, but manageable withdrawal; or withdrawal worsening despite detox at less intensive level.  Dimension 2: 1 Client tolerates and frandy with physical discomfort and is able to get the services that the client needs.  Dimension 3: 2 Client has difficulty with impulse control and lacks coping skills. Client has thoughts of suicide or harm to others without means; however, the thoughts may interfere with participation in some treatment activities. Client has difficulty functioning in significant life areas. Client has moderate symptoms of emotional, behavioral, or cognitive problems. Client is able to participate in most treatment activities.  Dimension 4: 3 Client displays inconsistent compliance, minimal  awareness of either the client's addiction or mental disorder, and is minimally cooperative.  Dimension 5: 3 Client has poor recognition and understanding of relapse and recidivism issues and displays moderately high vulnerability for further substance use or mental health problems. Client has few coping skills and rarely applies coping skills.  Dimension 6: 3 Client is not engaged in structured, meaningful activity and the client's peers, family, significant other, and living environment are unsupportive, or there is significant criminal justice system involvement.

## 2024-02-28 PROCEDURE — 128N000004 HC R&B CD ADULT

## 2024-02-28 PROCEDURE — 99232 SBSQ HOSP IP/OBS MODERATE 35: CPT | Performed by: NURSE PRACTITIONER

## 2024-02-28 PROCEDURE — 250N000013 HC RX MED GY IP 250 OP 250 PS 637: Performed by: NURSE PRACTITIONER

## 2024-02-28 PROCEDURE — 250N000013 HC RX MED GY IP 250 OP 250 PS 637: Performed by: PHYSICIAN ASSISTANT

## 2024-02-28 PROCEDURE — 250N000013 HC RX MED GY IP 250 OP 250 PS 637: Performed by: PSYCHIATRY & NEUROLOGY

## 2024-02-28 RX ORDER — HYDRALAZINE HYDROCHLORIDE 10 MG/1
10 TABLET, FILM COATED ORAL 4 TIMES DAILY PRN
Status: DISCONTINUED | OUTPATIENT
Start: 2024-02-28 | End: 2024-02-29 | Stop reason: HOSPADM

## 2024-02-28 RX ADMIN — NICOTINE 1 PATCH: 21 PATCH, EXTENDED RELEASE TRANSDERMAL at 08:26

## 2024-02-28 RX ADMIN — HYDROXYZINE HYDROCHLORIDE 25 MG: 25 TABLET, FILM COATED ORAL at 21:35

## 2024-02-28 RX ADMIN — FOLIC ACID 1 MG: 1 TABLET ORAL at 08:26

## 2024-02-28 RX ADMIN — THIAMINE HCL TAB 100 MG 100 MG: 100 TAB at 08:26

## 2024-02-28 RX ADMIN — NICOTINE POLACRILEX 4 MG: 4 GUM, CHEWING BUCCAL at 17:22

## 2024-02-28 RX ADMIN — HYDROXYZINE HYDROCHLORIDE 25 MG: 25 TABLET, FILM COATED ORAL at 16:29

## 2024-02-28 RX ADMIN — TRAZODONE HYDROCHLORIDE 50 MG: 50 TABLET ORAL at 21:35

## 2024-02-28 RX ADMIN — TRAZODONE HYDROCHLORIDE 50 MG: 50 TABLET ORAL at 23:12

## 2024-02-28 RX ADMIN — VENLAFAXINE HYDROCHLORIDE 150 MG: 150 CAPSULE, EXTENDED RELEASE ORAL at 08:27

## 2024-02-28 RX ADMIN — DIAZEPAM 10 MG: 5 TABLET ORAL at 08:32

## 2024-02-28 RX ADMIN — Medication 1 TABLET: at 08:26

## 2024-02-28 ASSESSMENT — ACTIVITIES OF DAILY LIVING (ADL)
ADLS_ACUITY_SCORE: 42

## 2024-02-28 NOTE — PLAN OF CARE
Goal Outcome Evaluation: Pt continues in alcohol withdrawal, however pt verbalizes symptom improvement.    Problem: Alcohol Withdrawal  Goal: Alcohol Withdrawal Symptom Control  Outcome: Progressing     Behavioral  Pt. ate 75 % of meals and hydrating adequately;  Attending to ADL's appropriately; Pt pleasant and cooperative upon approach;  Pt denied SI, SIB, HI, and hallucinations; no behavioral escalation or concerns noted this shift. Pt isolative to self. Pt present in the milieu;     Medical  Vital signs:  Temp: 98.4  F (36.9  C) Temp src: Oral BP: (!) 149/102 Pulse: 72      Vital signs:  Temp: 97.4  F (36.3  C) Temp src: Temporal BP: (!) 147/94 Pulse: 92      Pt remains hypertensive -  prn hydralazine with parameters available- pt did not meet parameters this shift for prn medication.     No complaints of physical pain/discomfort this shift.    Pt continues in alcohol withdrawal; MSSA 8 and 6; pt has required 80 mg of valium since admission; pt last required valium 2/28 @ 0832.      PRN'S: Valium 10 mg for withdrawal.     Plan  Pt plan is undecided at this time. Pt verbalizes desire to go to treatment, however remains indecisive about plan.

## 2024-02-28 NOTE — PROGRESS NOTES
Patient expressed interest in going to [facility] with sober living. The patient was given a list of several sober homes and told to contact them to see if they have openings for as early as tomorrow.

## 2024-02-28 NOTE — PLAN OF CARE
Problem: Alcohol Withdrawal  Goal: Alcohol Withdrawal Symptom Control  Outcome: Progressing   Goal Outcome Evaluation:    Plan of Care Reviewed With: patient      The patient was observed multiple times in the unit, but he spent some of his time in his room resting and napping in bed. His affect was flat, and he reported feeling anxious and depressed, but he appeared calm and pleasant during the assessment. He denied having any suicidal ideations or other mental health symptoms. However, he is experiencing difficulties sleeping and eating, although he uses the restroom typically. He was compliant with his medication and did not experience any adverse reactions.

## 2024-02-28 NOTE — PROGRESS NOTES
[facility],  Psychiatric Progress Note      Impression:     Jose Alberto Cuellar is a 49-year-old male admitted to [address] on 2/26/2024, arriving on the unit 2/27/2024.  He was admitted as a voluntary patient through the ED due to alcohol use disorder and withdrawal.  He was hospitalized on a medical unit for detox 1/9/2024 - 1/12/2024, at which time he had been consuming 1.75 mL of liquor daily.  After his discharge from the medical unit, he went home for a few days, relapsed, and then went to [facility] after maintaining 24 hours of sobriety.  He completed the program.  He relapsed immediately after his discharge home on 2/15/2024.  He said he didn't want to buy a large bottle of liquor, so he was making several trips to the liquor store buying travel sized bottles.  BAL was 0.038.  UTOX was negative.  He was taking his medications most of the time.  Since admission, the MSSA with Valium was initiated.  Effexor was changed to XR formulation and increased.  Trazodone was continued.  PRN Hydroxyzine was initiated.  Withdrawal symptoms are improving.         Diagnoses:     Alcohol use disorder, severe, dependence  Alcohol withdrawal  Nicotine use disorder  History of polysubstance abuse  Generalized anxiety disorder  Major depressive disorder, moderate, versus alcohol-induced depressive disorder         Plan:     Medications:  Increase Effexor XR to 225 mg daily.  Continue Trazodone 50 mg at HS.  PRN Hydroxyzine is available for anxiety.     Continue MSSA with Valium.     He is interested in sober housing and would be open to residential treatment prior to this.  Likely plan for discharge tomorrow.          Attestation:  Patient has been seen and evaluated by me, CAROLYN Dick CNP  The patient was counseled on nature of illness and treatment plan/options  Care was coordinated with treatment team  Total time > 35 minutes          Interim  History:     The patient's care was discussed with the treatment team and chart notes were reviewed.  Yesterday he took PRN Hydroxyzine x 2.  He received 10 mg of Valium for alcohol withdrawal.  He also received 10 mg of Valium this morning.  Pt napped yesterday.  He woke frequently last night.  He reports feeling sweaty and anxious.  Withdrawal symptoms are otherwise improving.  Pt states he needs structure in his life to help him maintain sobriety.  Pt is open to the possibility of residential CD treatment versus discharging to a sober house.  He requested more information and was referred to his .  Discussed increasing Effexor XR to target anxiety and depressive symptoms.           Medications:     Current Facility-Administered Medications   Medication    alum & mag hydroxide-simethicone (MAALOX) suspension 30 mL    atenolol (TENORMIN) tablet 50 mg    diazepam (VALIUM) tablet 5-20 mg    folic acid (FOLVITE) tablet 1 mg    hydrALAZINE (APRESOLINE) tablet 10 mg    hydrOXYzine HCl (ATARAX) tablet 25 mg    ibuprofen (ADVIL/MOTRIN) tablet 400 mg    levothyroxine (SYNTHROID/LEVOTHROID) tablet 75 mcg    loperamide (IMODIUM) capsule 2 mg    multivitamin w/minerals (THERA-VIT-M) tablet 1 tablet    nicotine (NICODERM CQ) 21 MG/24HR 24 hr patch 1 patch    nicotine polacrilex (NICORETTE) gum 4 mg    ondansetron (ZOFRAN ODT) ODT tab 4 mg    senna-docusate (SENOKOT-S/PERICOLACE) 8.6-50 MG per tablet 1 tablet    thiamine (B-1) tablet 100 mg    traZODone (DESYREL) tablet 50 mg    traZODone (DESYREL) tablet 50 mg    [START ON 2/29/2024] venlafaxine (EFFEXOR XR) 24 hr capsule 225 mg             Allergies:     Allergies   Allergen Reactions    Penicillins Angioedema and Rash     Converted from Drug Class Allergy: Penicillins          Psychiatric Examination:     /86 (BP Location: Right arm, Patient Position: Supine, Cuff Size: Adult Large)   Pulse 63   Temp (!) 96.7  F (35.9  C) (Temporal)   Resp 16   Ht 1.753  "m (5' 9\")   Wt 99.8 kg (220 lb)   SpO2 100%   BMI 32.49 kg/m      Appearance:  awake, alert, adequately groomed, and dressed in hospital scrubs  Attitude:  cooperative  Eye Contact:  good  Mood:  anxious and depressed  Affect:  appropriate and in normal range  Speech:  clear, coherent  Psychomotor Behavior:  no evidence of tardive dyskinesia, dystonia, or tics  Thought Process:  linear and goal oriented  Associations:  no loose associations  Thought Content:  no evidence of suicidal ideation or homicidal ideation and no evidence of psychotic thought  Insight:  fair  Judgment:  fair  Oriented to:  date, time, person, and place  Attention Span and Concentration:  fair, reports some impairment  Recent and Remote Memory:   fair to good  Language:  intact, fluent English  Fund of Knowledge:  appropriate  Muscle Strength and Tone:  normal  Gait and Station:   normal           Labs:     No results found for this or any previous visit (from the past 24 hour(s)).  "

## 2024-02-28 NOTE — PLAN OF CARE
Problem: Sleep Disturbance  Goal: Adequate Sleep/Rest  Outcome: Progressing   Goal Outcome Evaluation:      Problem: Alcohol Withdrawal  Goal: Alcohol Withdrawal Symptom Control  2/28/2024 0030 by Ric Cash RN         Plan of Care Reviewed With: patient     The Patient's MSSA scores were 2 and 4. He received no Valium. The 15-minute safety checks are in progress with no related incidents.

## 2024-02-28 NOTE — PROGRESS NOTES
Writer sent referral to [facility] with sober living - writer met with patient and gave him the number for [facility] if he wants to call to follow up this afternoon.    [facility]:  [phone] (main)  [phone] (fax)  [email or web address]    [address] [place], [place] [address]

## 2024-02-29 VITALS
TEMPERATURE: 97.7 F | DIASTOLIC BLOOD PRESSURE: 88 MMHG | RESPIRATION RATE: 16 BRPM | HEART RATE: 92 BPM | BODY MASS INDEX: 32.58 KG/M2 | HEIGHT: 69 IN | WEIGHT: 220 LBS | OXYGEN SATURATION: 97 % | SYSTOLIC BLOOD PRESSURE: 131 MMHG

## 2024-02-29 PROCEDURE — 250N000013 HC RX MED GY IP 250 OP 250 PS 637: Performed by: NURSE PRACTITIONER

## 2024-02-29 PROCEDURE — 250N000013 HC RX MED GY IP 250 OP 250 PS 637: Performed by: PHYSICIAN ASSISTANT

## 2024-02-29 PROCEDURE — 99239 HOSP IP/OBS DSCHRG MGMT >30: CPT | Performed by: NURSE PRACTITIONER

## 2024-02-29 RX ORDER — MULTIPLE VITAMINS W/ MINERALS TAB 9MG-400MCG
1 TAB ORAL DAILY
Qty: 30 TABLET | Refills: 1 | Status: ON HOLD | OUTPATIENT
Start: 2024-02-29 | End: 2024-06-13

## 2024-02-29 RX ORDER — FOLIC ACID 1 MG/1
1 TABLET ORAL DAILY
Qty: 30 TABLET | Refills: 1 | Status: ON HOLD | OUTPATIENT
Start: 2024-02-29 | End: 2024-06-13

## 2024-02-29 RX ORDER — DISULFIRAM 250 MG/1
250 TABLET ORAL DAILY
Qty: 30 TABLET | Refills: 1 | Status: ON HOLD | OUTPATIENT
Start: 2024-02-29 | End: 2024-06-13

## 2024-02-29 RX ORDER — NICOTINE 21 MG/24HR
1 PATCH, TRANSDERMAL 24 HOURS TRANSDERMAL DAILY
Status: ON HOLD
Start: 2024-02-29 | End: 2024-06-13

## 2024-02-29 RX ORDER — DISULFIRAM 250 MG/1
250 TABLET ORAL DAILY
Status: DISCONTINUED | OUTPATIENT
Start: 2024-02-29 | End: 2024-02-29 | Stop reason: HOSPADM

## 2024-02-29 RX ORDER — VENLAFAXINE HYDROCHLORIDE 75 MG/1
225 CAPSULE, EXTENDED RELEASE ORAL
Qty: 90 CAPSULE | Refills: 1 | Status: SHIPPED | OUTPATIENT
Start: 2024-02-29 | End: 2024-05-21

## 2024-02-29 RX ORDER — HYDROXYZINE HYDROCHLORIDE 25 MG/1
25 TABLET, FILM COATED ORAL EVERY 4 HOURS PRN
Qty: 90 TABLET | Refills: 1 | Status: ON HOLD | OUTPATIENT
Start: 2024-02-29 | End: 2024-06-13

## 2024-02-29 RX ORDER — LANOLIN ALCOHOL/MO/W.PET/CERES
100 CREAM (GRAM) TOPICAL DAILY
Qty: 30 TABLET | Refills: 1 | Status: ON HOLD | OUTPATIENT
Start: 2024-02-29 | End: 2024-06-13

## 2024-02-29 RX ADMIN — Medication 1 TABLET: at 08:44

## 2024-02-29 RX ADMIN — HYDROXYZINE HYDROCHLORIDE 25 MG: 25 TABLET, FILM COATED ORAL at 08:48

## 2024-02-29 RX ADMIN — NICOTINE 1 PATCH: 21 PATCH, EXTENDED RELEASE TRANSDERMAL at 08:45

## 2024-02-29 RX ADMIN — FOLIC ACID 1 MG: 1 TABLET ORAL at 08:44

## 2024-02-29 RX ADMIN — DISULFIRAM 250 MG: 250 TABLET ORAL at 08:44

## 2024-02-29 RX ADMIN — THIAMINE HCL TAB 100 MG 100 MG: 100 TAB at 08:44

## 2024-02-29 RX ADMIN — VENLAFAXINE HYDROCHLORIDE 225 MG: 150 CAPSULE, EXTENDED RELEASE ORAL at 08:44

## 2024-02-29 RX ADMIN — LEVOTHYROXINE SODIUM 75 MCG: 75 TABLET ORAL at 06:35

## 2024-02-29 ASSESSMENT — ACTIVITIES OF DAILY LIVING (ADL)
ADLS_ACUITY_SCORE: 42
DRESS: STREET CLOTHES
ADLS_ACUITY_SCORE: 42
HYGIENE/GROOMING: INDEPENDENT
ADLS_ACUITY_SCORE: 42
ADLS_ACUITY_SCORE: 42
ORAL_HYGIENE: INDEPENDENT
ADLS_ACUITY_SCORE: 42

## 2024-02-29 NOTE — PLAN OF CARE
Problem: Adult Inpatient Plan of Care  Goal: Readiness for Transition of Care  Outcome: Progressing   Goal Outcome Evaluation:    Plan of Care Reviewed With: patient       Pt discharging home With sister who  is sober then seeking alternative sober housing since, he would like to  his belongings from [place] prior to admitting to [facility]. They wanted Door to door intake hence he is seeking alternative living.    Pt is alert, umer stable, denies SI/HI, on Antabuse and out of detox as of 0832, he has not received Valium for over 24 hrs and is considered out of detox, MSSA 5, he rates anxiety and depression 7, hydroxyzine given with effect, pt denies pain, SI/HI, endorsing a good appetite, discharge meds and instructions reviewed with patient, belongings returned to patient. Patient calm.

## 2024-02-29 NOTE — PROGRESS NOTES
**ADDENDUM**  After speaking with the patient and [facility], the patient wants his sister to come pick him up, grab his belongings in [place], [place] and then complete an intake tomorrow morning. [facility] was not okay with this and insisted on door to door.   The patient has now decided to discharge home and use the resources given to him to seek out other treatment options.         Talked with [facility] this morning and they have an opening to admit the patient into their [facility] with sober living program. The patient will be going to a [facility] style living environment for the first 30 days and then moving into sober housing after that. [facility] will pick him up at     [address] [place] [id number]    Below is the contact info for [facility] and where the patient will be brought to post discharge    [facility]   [phone] (main)  [phone] (toll-free)  [phone] (fax)  [email or web address]    [address] [place], [place] [address]     A pick-up time is TBD as of now. Will update after team meeting.

## 2024-02-29 NOTE — PLAN OF CARE
"  Problem: Adult Inpatient Plan of Care  Goal: Plan of Care Review  Description: The Plan of Care Review/Shift note should be completed every shift.  The Outcome Evaluation is a brief statement about your assessment that the patient is improving, declining, or no change.  This information will be displayed automatically on your shift  note.  Outcome: Adequate for Care Transition  Goal: Patient-Specific Goal (Individualized)  Description: You can add care plan individualizations to a care plan. Examples of Individualization might be:  \"Parent requests to be called daily at 9am for status\", \"I have a hard time hearing out of my right ear\", or \"Do not touch me to wake me up as it startles  me\".  Outcome: Adequate for Care Transition  Goal: Absence of Hospital-Acquired Illness or Injury  Outcome: Adequate for Care Transition  Intervention: Prevent Skin Injury  Recent Flowsheet Documentation  Taken 2/29/2024 0800 by Lucita John, RN  Body Position: position changed independently  Goal: Optimal Comfort and Wellbeing  Outcome: Adequate for Care Transition  Goal: Readiness for Transition of Care  2/29/2024 1223 by Lucita John, RN  Outcome: Adequate for Care Transition  2/29/2024 1128 by Lucita John, RN  Outcome: Progressing   Goal Outcome Evaluation:    Plan of Care Reviewed With: patient       Pt made a PCP appointment on March 13th at 9.30am Chris Gomez for follow up care.            "

## 2024-02-29 NOTE — DISCHARGE SUMMARY
"Psychiatric Discharge Summary    Jose Alberto Cuellar MRN# [id number]   Age: 49 year old YOB: 1975     Date of Admission:  2/26/2024  Date of Discharge:  2/29/2024  Admitting Physician:  Angel Bauer MD  Discharge Physician:  CAROLYN Dick CNP (Contact: [phone])         Event Leading to Hospitalization:      From H&P 2/27/2024:    Jose Alberto Cuellar is a 49-year-old male admitted to [address] on 2/26/2024, arriving on the unit 2/27/2024.  He was admitted as a voluntary patient through the ED due to alcohol use disorder and withdrawal.  He was hospitalized on a medical unit for detox 1/9/2024 - 1/12/2024, at which time he had been consuming 1.75 mL of liquor daily.  After his discharge from the medical unit, he went home for a few days, relapsed, and then went to [facility] after maintaining 24 hours of sobriety.  He completed the program.  He relapsed immediately after his discharge home on 2/15/2024.  He said he didn't want to buy a large bottle of liquor, so he was making several trips to the liquor store buying travel sized bottles.  BAL was 0.038.  UTOX was negative.  He was taking his medications most of the time.      His mood is \"bad.\"  He feels \"depressed, stressed out, thoughts of being a loser.\"  He denies suicidal ideation.  He has anhedonia.  His concentration is \"not great.\"  He has low energy and low motivation.  His appetite has been low.  \"I've been throwing up for a week.\"  He reports that he passes out to sleep every night.  He has feelings of hopelessness, worthlessness and guilt.  Anxiety has been high.  He feels irritable.  He has racing thoughts.  He feels restless.  He is unsure about the source of his worries and states he tries not to think about anything at all.  He denies panic attacks.  He denies symptoms consistent with tamica, psychosis, OCD and PTSD.  He denies gambling.  He denies homicidal ideation.         See full " admission note by Veronica Sims NP 2/27/2024 for details.         Diagnoses:     Alcohol use disorder, severe, dependence  Nicotine use disorder  History of polysubstance abuse  Generalized anxiety disorder  Major depressive disorder, moderate, versus alcohol-induced depressive disorder         Labs:      Latest Reference Range & Units 02/26/24 15:47 02/26/24 16:57 02/26/24 17:36 02/27/24 07:29   Sodium 135 - 145 mmol/L 134 (L)      Potassium 3.4 - 5.3 mmol/L 4.3      Chloride 98 - 107 mmol/L 95 (L)      Carbon Dioxide (CO2) 22 - 29 mmol/L 27      Urea Nitrogen 6.0 - 20.0 mg/dL 14.6      Creatinine 0.67 - 1.17 mg/dL 0.99      GFR Estimate >60 mL/min/1.73m2 >90      Calcium 8.6 - 10.0 mg/dL 9.5      Anion Gap 7 - 15 mmol/L 12      Magnesium 1.7 - 2.3 mg/dL 1.9      Albumin 3.5 - 5.2 g/dL 4.8      Protein Total 6.4 - 8.3 g/dL 7.8      Alkaline Phosphatase 40 - 150 U/L 69      ALT 0 - 70 U/L 115 (H)      AST 0 - 45 U/L 108 (H)      Bilirubin Total <=1.2 mg/dL 0.5      Cholesterol <200 mg/dL    139   GGT 8 - 61 U/L    51   Glucose 70 - 99 mg/dL 113 (H)      HDL Cholesterol >=40 mg/dL    37 (L)   LDL Cholesterol Calculated     See Comment   Non HDL Cholesterol <130 mg/dL    102   Triglycerides <150 mg/dL    432 (H)   TSH 0.30 - 4.20 uIU/mL    2.67   WBC 4.0 - 11.0 10e3/uL 8.2      Hemoglobin 13.3 - 17.7 g/dL 16.6      Hematocrit 40.0 - 53.0 % 46.2      Platelet Count 150 - 450 10e3/uL 231      RBC Count 4.40 - 5.90 10e6/uL 5.07      MCV 78 - 100 fL 91      MCH 26.5 - 33.0 pg 32.7      MCHC 31.5 - 36.5 g/dL 35.9      RDW 10.0 - 15.0 % 12.8      % Neutrophils % 77      % Lymphocytes % 13      % Monocytes % 9      % Eosinophils % 0      % Basophils % 1      Absolute Basophils 0.0 - 0.2 10e3/uL 0.0      Absolute Eosinophils 0.0 - 0.7 10e3/uL 0.0      Absolute Immature Granulocytes <=0.4 10e3/uL 0.0      Absolute Lymphocytes 0.8 - 5.3 10e3/uL 1.1      Absolute Monocytes 0.0 - 1.3 10e3/uL 0.8      % Immature Granulocytes % 0       Absolute Neutrophils 1.6 - 8.3 10e3/uL 6.3      Absolute NRBCs 10e3/uL 0.0      NRBCs per 100 WBC <1 /100 0      Alcohol Breath Test 0.00 - 0.01   0.038 !     Amphetamine Qual Urine Screen Negative    Screen Negative    Fentanyl Qual Urine Screen Negative    Screen Negative    Cocaine Urine Screen Negative    Screen Negative    Benzodiazepine Urine Screen Negative    Screen Negative    Opiates Qualitative Urine Screen Negative    Screen Negative    PCP Urine Screen Negative    Screen Negative    Cannabinoids Qual Urine Screen Negative    Screen Negative    Barbiturates Qual Urine Screen Negative    Screen Negative           Consults:     Internal Medicine Consult 2/26/2024:    Jose Alberto Cuellar is a 49M w/ history of EtOH dependence, tobacco use, thyroid disease who is admitted to station 3a with EtOH intoxication and withdrawal.        EtOH intoxication and withdrawal.   Drinking 1.75L of hard alcohol per day. Last drink prior to admission. Tox screen: breath EtOH 0.038, urine tox neg. no history of withdrawal seizures and DTs. MSSA currently 4 and 4. LFTs elevated as per below.    - Continue on MSSA protocol with benzodiazepines as indicated   - Seizure precautions   - Management per Psychiatry   - Agree with MVI, folic acid, and thiamine supplements   - Notify medicine for SBP >180 or DBP >110     Trace hyponatremia  Suspect his renal losses secondary to EtOH ingestion- Na 134  - encourage oral hydration     Transaminitis  Likely EtOH toxicity and 1:1 pattern could suggest hepatic steatosis.  CT abdomen pelvis with IV contrast on 4/24/2022 noted fatty infiltration of the liver  -Limit hepatotoxins as able  -Limit acetaminophen to 2 g/day  -Encourage EtOH cessation     Incidental small umbilical hernia-fat-containing noted on prior Ct-monitor     Bilateral nonobstructing nephrolithiasis-noted on prior Ct-monitor     Degenerative disc disease-noted on CT abdomen pelvis especially at the lumbosacral  space-monitor     Hypertriglyceridemia  Dyslipidemia  Admission cholesterol panel with elevated triglycerides and low HDL in the setting of acute EtOH toxicity, unclear if fasting laboratories  -Encourage EtOH cessation is likely contributory to elevated triglyceride  -Follow-up with primary care provider for nonurgent repeat lipid panel     Thyroid disease-TSH is normal here     Medicine will sign off.         Hospital Course:     Jose Alberto Cuellar was admitted to Station 3[facility] [place] with attending Angel Bauer MD, under the direct care of Veronica Sims NP as a voluntary patient.  The patient was placed under status 15 (15 minute checks) to ensure patient safety.     MSSA protocol was initiated due to the patient's history of alcohol abuse and concern for withdrawal symptoms.  He received 20 mg of Valium on day 1, 10 mg of Valium on day 2 and 10 mg of Valium on day 3.  Thereafter his withdrawal subsided, and the MSSA was discontinued.      Effexor was changed from immediate release to XR formulation and increased to 225 mg daily.  Antabuse 250 mg daily was initiated.  Trazodone 50 mg at HS was continued.  PRN Hydroxyzine 25 mg was initiated.      Jose Alberto Cuellar spent time in the milieu.  He was calm and cooperative during interactions with staff and patients.  He was offered to opportunity for [facility] IOP with sober housing but would have had to transfer directly to treatment per [facility].  He wanted to go home to pack belongings first, so he declined this opportunity.  He was given resources and plans to follow up outpatient while staying with his sister.    He reported feeling less anxious and more hopeful.  He denied suicidal ideation.      Jose Alberto Cuellar was discharged to his sister's home.  At the time of discharge Jose Alberto Cuellar was determined to not be a danger to himself or others.          Discharge Medications:     disulfiram 250 MG tablet  Commonly known as:  ANTABUSE        Dose: 250 mg  Take 1 tablet (250 mg) by mouth daily  Quantity: 30 tablet  Refills: 1     hydrOXYzine HCl 25 MG tablet        Dose: 25 mg  Take 1 tablet (25 mg) by mouth every 4 hours as needed for anxiety  Quantity: 90 tablet  Refills: 1     multivitamin w/minerals tablet        Dose: 1 tablet  Take 1 tablet by mouth daily  Quantity: 30 tablet  Refills: 1     nicotine 21 MG/24HR 24 hr patch  Commonly known as: NICODERM CQ   Dose: 1 patch  Place 1 patch onto the skin daily     thiamine 100 MG tablet  Commonly known as: B-1     Dose: 100 mg  Take 1 tablet (100 mg) by mouth daily  Quantity: 30 tablet  Refills: 1     venlafaxine 75 MG 24 hr capsule  Commonly known as: EFFEXOR XR        Dose: 225 mg  Take 3 capsules (225 mg) by mouth daily (with breakfast)  Quantity: 90 capsule  Refills: 1     folic acid 1 MG tablet  Commonly known as: FOLVITE        Dose: 1 mg  Take 1 tablet (1 mg) by mouth daily  Quantity: 30 tablet  Refills: 1     levothyroxine 75 MCG tablet  Commonly known as: SYNTHROID/LEVOTHROID   Dose: 75 mcg  Take 75 mcg by mouth daily       ondansetron 4 MG ODT tab  Commonly known as: ZOFRAN ODT      Dose: 4 mg  Take 4 mg by mouth every 8 hours as needed for nausea or vomiting     traZODone 50 MG tablet  Commonly known as: DESYREL   Dose: 50 mg  Take 1 tablet (50 mg) by mouth at bedtime        Where to get your medicines      These medications were sent to [facility] [address]  [address] [address]    Phone: [phone]     disulfiram 250 MG tablet    folic acid 1 MG tablet    hydrOXYzine HCl 25 MG tablet    multivitamin w/minerals tablet    thiamine 100 MG tablet    venlafaxine 75 MG 24 hr capsule                Psychiatric Examination:     Appearance:  awake, alert, adequately groomed, and dressed in hospital scrubs  Attitude:  cooperative  Eye Contact:  good  Mood:  improved, less anxious, more hopeful  Affect:  appropriate and  "in normal range  Speech:  clear, coherent  Psychomotor Behavior:  no evidence of tardive dyskinesia, dystonia, or tics  Thought Process:  linear and goal oriented  Associations:  no loose associations  Thought Content:  no evidence of suicidal ideation or homicidal ideation and no evidence of psychotic thought  Insight:  fair  Judgment:  fair  Oriented to:  date, time, person, and place  Attention Span and Concentration:  fair, reports some impairment  Recent and Remote Memory:   fair to good  Language:  intact, fluent English  Fund of Knowledge:  appropriate  Muscle Strength and Tone:  normal  Gait and Station:   normal      /76 (BP Location: Right arm, Patient Position: Supine, Cuff Size: Adult Regular)   Pulse 78   Temp 97.3  F (36.3  C) (Temporal)   Resp 16   Ht 1.753 m (5' 9\")   Wt 99.8 kg (220 lb)   SpO2 96%   BMI 32.49 kg/m           Discharge Plan:     Per Discharge AVS:    Summary: You were admitted to [facility] on 2/29/24  for detoxification from Alcohol .  A medical exam was performed that included lab work. You have met with a  and opted to discharge to home. Please take care and make your recovery a daily priority, Jose Alberto!  It was a pleasure working with you and the entire treatment team here wishes you the very best in your recovery!     Recommendation:  Build a healthy and stable lifestyle. Attend IOP groups and develop a positive support group. Return to [place] if a higher level of care is needed    Major Treatments, Procedures and Findings:  You were treated for Alcohol  detoxification using Valium . As an outpatient you will be prescribed Antabuse , please take this medication as prescribed until it is gone. You did not complete a chemical dependency assessment. You had labs drawn and those results were reviewed with you. Please take a copy of your lab work with you to your next primary care provider appointment.    Symptoms to Report:  If you experience more anxiety, " confusion, sleeplessness, deep sadness or thoughts of suicide, notify your treatment team or notify your primary care provider. IF ANY OF THE SYMPTOMS YOU ARE EXPERIENCING ARE A MEDICAL EMERGENCY CALL 911 IMMEDIATELY.     Lifestyle Adjustment: Adjust your lifestyle to get enough sleep, relaxation, exercise and good nutrition. Continue to develop healthy coping skills to decrease stress and promote a sober living environment. Do not use mood altering substances including alcohol, illegal drugs or addictive medications other than what is currently prescribed.     Disposition: Home    Facts about COVID19 at www.cdc.gov/COVID19 and www.[place].gov/covid19    Keeping hands clean is one of the most important steps we can take to avoid getting sick and spreading germs to others.  Please wash your hands frequently and lather with soap for at least 20 seconds!    Follow-up Appointment:   Pt declined to make follow up appointment   Patient declined any assistance from Case Management in scheduling therapy and/or psychiatry.      He was provided with a 30-day supply of medications plus one refill.  He was advised to take his medications as prescribed and to abstain from alcohol and illicit substances.      Attestation:  The patient has been seen and evaluated by me, CAROLYN Dick CNP   Discharge time > 30 minutes

## 2024-02-29 NOTE — PLAN OF CARE
Problem: Alcohol Withdrawal  Goal: Alcohol Withdrawal Symptom Control  2/29/2024 0114 by Ric Cash RN  Outcome: Progressing     Problem: Sleep Disturbance  Goal: Adequate Sleep/Rest  Outcome: Progressing   Goal Outcome Evaluation:Plan of Care Reviewed With: patient    The Patient slept well and had an MSSA score of 2, meaning he did not require Valium. The fifteen-minute safety checks are ongoing, and no related incidents have occurred. He last took Valium on February 28 th, 2024, at 0832 am.               Dr. Becerra

## 2024-02-29 NOTE — PLAN OF CARE
Problem: Alcohol Withdrawal  Goal: Alcohol Withdrawal Symptom Control  Outcome: Progressing     Problem: Adult Inpatient Plan of Care  Goal: Optimal Comfort and Wellbeing  Outcome: Progressing   Goal Outcome Evaluation:    Plan of Care Reviewed With: patient      The patient was visible, but he spent some time on the phone and other times in his room napping. He was social with his peers and watched TV in the lounge. He seemed calm, but he reported anxiety, depression, loneliness, feeling sad, and hopelessness.  He denied having any suicidal ideations or other mental health symptoms. He is experiencing difficulties with staying asleep at night. The writer reminded him to let the team know. The Pt is eating and using the restroom fine. He was compliant with his medication( Trazodone and Vistaril) and did not experience any adverse reactions. Finally, his MSSA scores were 3 and 4.

## 2024-03-04 ENCOUNTER — PATIENT OUTREACH (OUTPATIENT)
Dept: CARE COORDINATION | Facility: CLINIC | Age: 49
End: 2024-03-04
Payer: MEDICAID

## 2024-03-04 NOTE — PROGRESS NOTES
Connected Care Resource Center Contact  [facility]/Voicemail     Clinical Data: Post-Discharge Outreach     Outreach attempted x 2.  Left message on patient's voicemail, providing [facility]'s central phone number of [phone] ([phone]) for questions/concerns and/or to schedule an appt with an [facility] provider, if they do not have a PCP.      Plan:  General acute hospital will do no further outreaches at this time.       RANDY Romero  Connected Care Resource Center, [facility]    *Connected Care Resource Team does NOT follow patient ongoing. Referrals are identified based on internal discharge reports and the outreach is to ensure patient has an understanding of their discharge instructions.

## 2024-03-18 ENCOUNTER — ORDERS ONLY (AUTO-RELEASED) (OUTPATIENT)
Dept: FAMILY MEDICINE | Facility: CLINIC | Age: 49
End: 2024-03-18

## 2024-03-18 ENCOUNTER — OFFICE VISIT (OUTPATIENT)
Dept: FAMILY MEDICINE | Facility: CLINIC | Age: 49
End: 2024-03-18
Payer: MEDICAID

## 2024-03-18 VITALS
WEIGHT: 226.5 LBS | HEART RATE: 82 BPM | DIASTOLIC BLOOD PRESSURE: 72 MMHG | TEMPERATURE: 98.8 F | SYSTOLIC BLOOD PRESSURE: 118 MMHG | RESPIRATION RATE: 21 BRPM | HEIGHT: 69 IN | OXYGEN SATURATION: 97 % | BODY MASS INDEX: 33.55 KG/M2

## 2024-03-18 DIAGNOSIS — G47.00 INSOMNIA, UNSPECIFIED TYPE: ICD-10-CM

## 2024-03-18 DIAGNOSIS — R79.89 ELEVATED LFTS: ICD-10-CM

## 2024-03-18 DIAGNOSIS — F10.91 ALCOHOL USE DISORDER IN REMISSION: Primary | ICD-10-CM

## 2024-03-18 DIAGNOSIS — Z12.11 SCREEN FOR COLON CANCER: ICD-10-CM

## 2024-03-18 DIAGNOSIS — Z11.59 NEED FOR HEPATITIS C SCREENING TEST: ICD-10-CM

## 2024-03-18 DIAGNOSIS — E78.1 HYPERTRIGLYCERIDEMIA: ICD-10-CM

## 2024-03-18 DIAGNOSIS — Z11.4 SCREENING FOR HIV (HUMAN IMMUNODEFICIENCY VIRUS): ICD-10-CM

## 2024-03-18 PROCEDURE — 99213 OFFICE O/P EST LOW 20 MIN: CPT

## 2024-03-18 RX ORDER — TRAZODONE HYDROCHLORIDE 50 MG/1
50 TABLET, FILM COATED ORAL AT BEDTIME
Qty: 30 TABLET | Refills: 2 | Status: ON HOLD | OUTPATIENT
Start: 2024-03-18 | End: 2024-06-13

## 2024-03-18 ASSESSMENT — ANXIETY QUESTIONNAIRES
7. FEELING AFRAID AS IF SOMETHING AWFUL MIGHT HAPPEN: NOT AT ALL
GAD7 TOTAL SCORE: 4
GAD7 TOTAL SCORE: 4
3. WORRYING TOO MUCH ABOUT DIFFERENT THINGS: SEVERAL DAYS
8. IF YOU CHECKED OFF ANY PROBLEMS, HOW DIFFICULT HAVE THESE MADE IT FOR YOU TO DO YOUR WORK, TAKE CARE OF THINGS AT HOME, OR GET ALONG WITH OTHER PEOPLE?: NOT DIFFICULT AT ALL
1. FEELING NERVOUS, ANXIOUS, OR ON EDGE: SEVERAL DAYS
IF YOU CHECKED OFF ANY PROBLEMS ON THIS QUESTIONNAIRE, HOW DIFFICULT HAVE THESE PROBLEMS MADE IT FOR YOU TO DO YOUR WORK, TAKE CARE OF THINGS AT HOME, OR GET ALONG WITH OTHER PEOPLE: NOT DIFFICULT AT ALL
2. NOT BEING ABLE TO STOP OR CONTROL WORRYING: NOT AT ALL
7. FEELING AFRAID AS IF SOMETHING AWFUL MIGHT HAPPEN: NOT AT ALL
4. TROUBLE RELAXING: SEVERAL DAYS
GAD7 TOTAL SCORE: 4
6. BECOMING EASILY ANNOYED OR IRRITABLE: NOT AT ALL
5. BEING SO RESTLESS THAT IT IS HARD TO SIT STILL: SEVERAL DAYS

## 2024-03-18 ASSESSMENT — PATIENT HEALTH QUESTIONNAIRE - PHQ9
10. IF YOU CHECKED OFF ANY PROBLEMS, HOW DIFFICULT HAVE THESE PROBLEMS MADE IT FOR YOU TO DO YOUR WORK, TAKE CARE OF THINGS AT HOME, OR GET ALONG WITH OTHER PEOPLE: NOT DIFFICULT AT ALL
SUM OF ALL RESPONSES TO PHQ QUESTIONS 1-9: 5
SUM OF ALL RESPONSES TO PHQ QUESTIONS 1-9: 5

## 2024-03-18 ASSESSMENT — PAIN SCALES - GENERAL: PAINLEVEL: NO PAIN (0)

## 2024-03-18 NOTE — PROGRESS NOTES
Assessment & Plan     Insomnia, unspecified type  - traZODone (DESYREL) 50 MG tablet; Take 1 tablet (50 mg) by mouth at bedtime    Screen for colon cancer  - MESFIN(EXACT SCIENCES); Future    Screening for HIV (human immunodeficiency virus)  - HIV Antigen Antibody Combo; Future    Need for hepatitis C screening test  - Hepatitis C Screen Reflex to HCV RNA Quant and Genotype; Future    Elevated LFTs, Hypertriglyceridemia  Noted most recent LFTs were elevated and late February around the time of hospitalization.  I had offered patient a lab recheck of the LFTs, but he is interested in following up with this when we recheck his triglycerides in 3 months.  I recommended that he try fish oil supplement for the triglyceride issue.  We discussed the potential for alcohol and hypertriglyceridemia to contribute to pancreatitis.    Alcohol use disorder in remission  Alcohol use in remission.  Provided patient with praise regarding this.  He uses Antabuse as needed when he feels cravings as a deterrent for drinking.  He reports strong preference for not wanting to take medication every day.  We discussed the option of trying some naltrexone for reducing cravings, which he had declined for today.  Recommend follow-up in 3 months regarding triglycerides LFTs and alcohol use      Subjective   Jose Alberto is a 49 year old, presenting for the following health issues:  Establish Care        3/18/2024    11:27 AM   Additional Questions   Roomed by Nargis Maciel   Taking antabuse if having urges: finds himself having urges 1-2x/week.    Week after leaving treatment, he reports not doing well. In [facility] for [facility] days. Has been doing AA meetings since last ER visit.  Last drink awas on 02/29/2024.  Family helpes keep him sober. A bunch of people at work, GF helps him sober.    Mental health doing well. Stopped taking sertraline when he got to [facility] and then was venlfaxaine (225 mg).     Bp at home: 120-130/80-90.    History of  "Present Illness       Mental Health Follow-up:  Patient presents to follow-up on Depression & Anxiety.Patient's depression since last visit has been:  Better  The patient is not having other symptoms associated with depression.  Patient's anxiety since last visit has been:  Better  The patient is not having other symptoms associated with anxiety.  Any significant life events: financial concerns, health concerns and other  Patient is not feeling anxious or having panic attacks.  Patient has no concerns about alcohol or drug use.    Hyperlipidemia:  He presents for follow up of hyperlipidemia.   He is not taking medication to lower cholesterol. He is having myalgia or other side effects to statin medications.    Hypertension: He presents for follow up of hypertension.  He does check blood pressure  regularly outside of the clinic. Outpatient blood pressures have not been over 140/90. He follows a low salt diet.     Hypothyroidism:     Since last visit, patient describes the following symptoms::  Anxiety, Depression and Fatigue    Reason for visit:  Follow up on perscriptions and ask questions    He eats 0-1 servings of fruits and vegetables daily.He consumes 0 sweetened beverage(s) daily.He exercises with enough effort to increase his heart rate 60 or more minutes per day.  He exercises with enough effort to increase his heart rate 6 days per week.   He is taking medications regularly.         Objective    /72   Pulse 82   Temp 98.8  F (37.1  C) (Temporal)   Resp 21   Ht 1.75 m (5' 8.9\")   Wt 102.7 kg (226 lb 8 oz)   SpO2 97%   BMI 33.55 kg/m    Body mass index is 33.55 kg/m .  Physical Exam   GENERAL: alert and no distress  CV: regular rate and rhythm, normal S1 S2, no S3 or S4, no murmur, click or rub, no peripheral edema  PSYCH: mentation appears normal, affect normal/bright            Signed Electronically by: Nick Cuellar NP    "

## 2024-05-19 DIAGNOSIS — F33.1 MODERATE EPISODE OF RECURRENT MAJOR DEPRESSIVE DISORDER (H): ICD-10-CM

## 2024-05-19 DIAGNOSIS — G47.00 INSOMNIA, UNSPECIFIED TYPE: ICD-10-CM

## 2024-05-19 DIAGNOSIS — F41.9 ANXIETY: ICD-10-CM

## 2024-05-19 NOTE — TELEPHONE ENCOUNTER
Medication Question or Refill    Contacts         Type Contact Phone/Fax    05/19/2024 12:41 PM CDT Phone (Incoming) Jose Alberto Cuellar (Self) [phone] (H)            What medication are you calling about (include dose and sig)?: Venilfaxine, hydroxazine, trazadone    [facility]   [id number] [address] [place] [address] AT [address] [facility]  [address] [facility] [address]  Phone: [phone] Fax: [phone]        Controlled Substance Agreement on file:   CSA -- Patient Level:    CSA: None found at the patient level.       Who prescribed the medication?: Pedro Cuellar    Do you need a refill? Yes    When did you use the medication last? Yesterday 5/18    Patient offered an appointment? No    Do you have any questions or concerns?  No      Okay to leave a detailed message?: Yes at Cell number on file:    Telephone Information:   Mobile [phone]

## 2024-05-20 DIAGNOSIS — F33.1 MODERATE EPISODE OF RECURRENT MAJOR DEPRESSIVE DISORDER (H): ICD-10-CM

## 2024-05-20 DIAGNOSIS — F41.9 ANXIETY: ICD-10-CM

## 2024-05-20 RX ORDER — VENLAFAXINE HYDROCHLORIDE 75 MG/1
225 CAPSULE, EXTENDED RELEASE ORAL
Qty: 90 CAPSULE | Refills: 1 | OUTPATIENT
Start: 2024-05-20

## 2024-05-20 RX ORDER — TRAZODONE HYDROCHLORIDE 50 MG/1
50 TABLET, FILM COATED ORAL AT BEDTIME
Qty: 30 TABLET | Refills: 2 | OUTPATIENT
Start: 2024-05-20

## 2024-05-20 RX ORDER — HYDROXYZINE HYDROCHLORIDE 25 MG/1
25 TABLET, FILM COATED ORAL EVERY 4 HOURS PRN
Qty: 90 TABLET | Refills: 1 | OUTPATIENT
Start: 2024-05-20

## 2024-05-20 NOTE — TELEPHONE ENCOUNTER
[facility] Call Center    Phone Message    May a detailed message be left on voicemail: yes     Reason for Call: Medication Refill Request    Has the patient contacted the pharmacy for the refill? Yes     Name of medication being requested: Venlafaxine (EFFEXOR XR) 75 MG 24 hr capsule  Provider who prescribed the medication: Nesha Sims APRN CNP      Name of medication being requested: TraZODone (DESYREL) 50 MG tablet  Provider who prescribed the medication: Nick Cuellar NP      Name of medication being requested: HydrOXYzine HCl (ATARAX) 25 MG tablet  Provider who prescribed the medication: Nesha Sims APRN CNP        Pharmacy:   [id number] [address] [facility] AT [address] [facility], [place], [place], [address]    Date medication is needed: ASAP-OUT    OF NOTE PT IS SCHEDULED FOR F/UP APPT ON 6/11/24 WITH PCP FOR MED RENEWAL. PER PT COMPLETELY OUT OF MEDS.     Action Taken: Other: [place] [facility] POOL    Travel Screening: Not Applicable

## 2024-05-21 RX ORDER — VENLAFAXINE HYDROCHLORIDE 75 MG/1
225 CAPSULE, EXTENDED RELEASE ORAL
Qty: 90 CAPSULE | Refills: 0 | Status: ON HOLD | OUTPATIENT
Start: 2024-05-21 | End: 2024-06-13

## 2024-06-06 ENCOUNTER — TELEPHONE (OUTPATIENT)
Dept: BEHAVIORAL HEALTH | Facility: CLINIC | Age: 49
End: 2024-06-06

## 2024-06-06 NOTE — TELEPHONE ENCOUNTER
"Pt is a(n) adult (18+ out of HS) Seeking as eval for Adult JACKI Assessment for [facility]..  Appointment scheduled by:  Patient.  (self-pay - complete Cost Estimate)  Caller name:  same    Caller phone #: same  Legal Guardianship Reviewed?  No  Honoring Choices Notified?  No  Brief reason for appt:  seeking return to [facility]      needed?  NO    Contact information verified/updated: Yes    If appt is for adult JACKI program location, confirm you have verified the location and address with the patient referring to the template header.  Yes    Jt Zendejas    \"We have scheduled your evaluation. In the event that your insurance coverage comes back as out of network, you may receive a call to cancel your appointment and direct you to your insurance company for in-network coverage.\"    Disclaimer regarding insurance read to patient?  Yes   "

## 2024-06-06 NOTE — TELEPHONE ENCOUNTER
Patient called [facility] stating he would like to come back. Writer let patient know he will need a new chemical dependency assessment. Pt transferred to Intake for scheduling. Pt will ask for a referral to [facility].

## 2024-06-10 ENCOUNTER — HOSPITAL ENCOUNTER (INPATIENT)
Facility: CLINIC | Age: 49
LOS: 3 days | Discharge: PSYCHIATRIC OR SUBSTANCE USE FACILITY | End: 2024-06-13
Attending: FAMILY MEDICINE | Admitting: PSYCHIATRY & NEUROLOGY
Payer: COMMERCIAL

## 2024-06-10 ENCOUNTER — TELEPHONE (OUTPATIENT)
Dept: BEHAVIORAL HEALTH | Facility: CLINIC | Age: 49
End: 2024-06-10

## 2024-06-10 DIAGNOSIS — F10.230 ALCOHOL DEPENDENCE WITH UNCOMPLICATED WITHDRAWAL (H): ICD-10-CM

## 2024-06-10 DIAGNOSIS — F33.1 MODERATE EPISODE OF RECURRENT MAJOR DEPRESSIVE DISORDER (H): ICD-10-CM

## 2024-06-10 DIAGNOSIS — E03.9 HYPOTHYROIDISM, UNSPECIFIED TYPE: ICD-10-CM

## 2024-06-10 DIAGNOSIS — R03.0 ELEVATED BLOOD PRESSURE READING WITHOUT DIAGNOSIS OF HYPERTENSION: ICD-10-CM

## 2024-06-10 DIAGNOSIS — G47.00 INSOMNIA, UNSPECIFIED TYPE: ICD-10-CM

## 2024-06-10 DIAGNOSIS — E83.42 HYPOMAGNESEMIA: ICD-10-CM

## 2024-06-10 DIAGNOSIS — L20.81 ATOPIC NEURODERMATITIS: ICD-10-CM

## 2024-06-10 DIAGNOSIS — F10.20 ALCOHOL USE DISORDER, SEVERE, DEPENDENCE (H): Primary | ICD-10-CM
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
## 2024-06-10 DIAGNOSIS — F41.9 ANXIETY: ICD-10-CM

## 2024-06-10 DIAGNOSIS — F17.200 NICOTINE USE DISORDER: ICD-10-CM

## 2024-06-10 LAB
ALBUMIN SERPL BCG-MCNC: 4.3 G/DL (ref 3.5–5.2)
ALP SERPL-CCNC: 69 U/L (ref 40–150)
ALT SERPL W P-5'-P-CCNC: 31 U/L (ref 0–70)
AMPHETAMINES UR QL SCN: NORMAL
ANION GAP SERPL CALCULATED.3IONS-SCNC: 13 MMOL/L (ref 7–15)
AST SERPL W P-5'-P-CCNC: 30 U/L (ref 0–45)
BARBITURATES UR QL SCN: NORMAL
BASOPHILS # BLD AUTO: 0 10E3/UL (ref 0–0.2)
BASOPHILS NFR BLD AUTO: 1 %
BENZODIAZ UR QL SCN: NORMAL
BILIRUB SERPL-MCNC: <0.2 MG/DL
BUN SERPL-MCNC: 13.9 MG/DL (ref 6–20)
BZE UR QL SCN: NORMAL
CALCIUM SERPL-MCNC: 9.6 MG/DL (ref 8.6–10)
CANNABINOIDS UR QL SCN: NORMAL
CHLORIDE SERPL-SCNC: 103 MMOL/L (ref 98–107)
CREAT SERPL-MCNC: 0.76 MG/DL (ref 0.67–1.17)
DEPRECATED HCO3 PLAS-SCNC: 25 MMOL/L (ref 22–29)
EGFRCR SERPLBLD CKD-EPI 2021: >90 ML/MIN/1.73M2
EOSINOPHIL # BLD AUTO: 0 10E3/UL (ref 0–0.7)
EOSINOPHIL NFR BLD AUTO: 1 %
ERYTHROCYTE [DISTWIDTH] IN BLOOD BY AUTOMATED COUNT: 14.2 % (ref 10–15)
ETHANOL SERPL-MCNC: 0.11 G/DL
FENTANYL UR QL: NORMAL
GLUCOSE SERPL-MCNC: 106 MG/DL (ref 70–99)
HCT VFR BLD AUTO: 42.9 % (ref 40–53)
HGB BLD-MCNC: 14.7 G/DL (ref 13.3–17.7)
IMM GRANULOCYTES # BLD: 0 10E3/UL
IMM GRANULOCYTES NFR BLD: 1 %
LYMPHOCYTES # BLD AUTO: 0.7 10E3/UL (ref 0.8–5.3)
LYMPHOCYTES NFR BLD AUTO: 16 %
MAGNESIUM SERPL-MCNC: 1.6 MG/DL (ref 1.7–2.3)
MCH RBC QN AUTO: 32.7 PG (ref 26.5–33)
MCHC RBC AUTO-ENTMCNC: 34.3 G/DL (ref 31.5–36.5)
MCV RBC AUTO: 96 FL (ref 78–100)
MONOCYTES # BLD AUTO: 0.6 10E3/UL (ref 0–1.3)
MONOCYTES NFR BLD AUTO: 13 %
NEUTROPHILS # BLD AUTO: 3 10E3/UL (ref 1.6–8.3)
NEUTROPHILS NFR BLD AUTO: 68 %
NRBC # BLD AUTO: 0 10E3/UL
NRBC BLD AUTO-RTO: 0 /100
OPIATES UR QL SCN: NORMAL
PCP QUAL URINE (ROCHE): NORMAL
PLATELET # BLD AUTO: 179 10E3/UL (ref 150–450)
POTASSIUM SERPL-SCNC: 3.6 MMOL/L (ref 3.4–5.3)
PROT SERPL-MCNC: 7 G/DL (ref 6.4–8.3)
RBC # BLD AUTO: 4.49 10E6/UL (ref 4.4–5.9)
SODIUM SERPL-SCNC: 141 MMOL/L (ref 135–145)
WBC # BLD AUTO: 4.3 10E3/UL (ref 4–11)

## 2024-06-10 PROCEDURE — HZ2ZZZZ DETOXIFICATION SERVICES FOR SUBSTANCE ABUSE TREATMENT: ICD-10-PCS | Performed by: PSYCHIATRY & NEUROLOGY

## 2024-06-10 PROCEDURE — 250N000013 HC RX MED GY IP 250 OP 250 PS 637: Performed by: NURSE PRACTITIONER

## 2024-06-10 PROCEDURE — 128N000004 HC R&B CD ADULT

## 2024-06-10 PROCEDURE — 84450 TRANSFERASE (AST) (SGOT): CPT | Performed by: FAMILY MEDICINE

## 2024-06-10 PROCEDURE — 82247 BILIRUBIN TOTAL: CPT | Performed by: FAMILY MEDICINE

## 2024-06-10 PROCEDURE — 85025 COMPLETE CBC W/AUTO DIFF WBC: CPT | Performed by: FAMILY MEDICINE

## 2024-06-10 PROCEDURE — 36415 COLL VENOUS BLD VENIPUNCTURE: CPT | Performed by: FAMILY MEDICINE

## 2024-06-10 PROCEDURE — 99285 EMERGENCY DEPT VISIT HI MDM: CPT | Performed by: FAMILY MEDICINE

## 2024-06-10 PROCEDURE — 250N000013 HC RX MED GY IP 250 OP 250 PS 637: Performed by: PSYCHIATRY & NEUROLOGY

## 2024-06-10 PROCEDURE — 83735 ASSAY OF MAGNESIUM: CPT | Performed by: FAMILY MEDICINE

## 2024-06-10 PROCEDURE — 82077 ASSAY SPEC XCP UR&BREATH IA: CPT | Performed by: FAMILY MEDICINE

## 2024-06-10 PROCEDURE — 250N000013 HC RX MED GY IP 250 OP 250 PS 637: Performed by: FAMILY MEDICINE

## 2024-06-10 PROCEDURE — 80307 DRUG TEST PRSMV CHEM ANLYZR: CPT | Performed by: FAMILY MEDICINE

## 2024-06-10 RX ORDER — FOLIC ACID 1 MG/1
1 TABLET ORAL DAILY
Status: DISCONTINUED | OUTPATIENT
Start: 2024-06-10 | End: 2024-06-14 | Stop reason: HOSPADM

## 2024-06-10 RX ORDER — CLONIDINE HYDROCHLORIDE 0.1 MG/1
0.1 TABLET ORAL 2 TIMES DAILY PRN
Status: DISCONTINUED | OUTPATIENT
Start: 2024-06-10 | End: 2024-06-14 | Stop reason: HOSPADM

## 2024-06-10 RX ORDER — ATENOLOL 25 MG/1
25 TABLET ORAL ONCE
Status: COMPLETED | OUTPATIENT
Start: 2024-06-10 | End: 2024-06-10

## 2024-06-10 RX ORDER — HYDROXYZINE HYDROCHLORIDE 25 MG/1
25 TABLET, FILM COATED ORAL EVERY 4 HOURS PRN
Status: DISCONTINUED | OUTPATIENT
Start: 2024-06-10 | End: 2024-06-14 | Stop reason: HOSPADM

## 2024-06-10 RX ORDER — ONDANSETRON 4 MG/1
4 TABLET, FILM COATED ORAL EVERY 6 HOURS PRN
Status: DISCONTINUED | OUTPATIENT
Start: 2024-06-10 | End: 2024-06-14 | Stop reason: HOSPADM

## 2024-06-10 RX ORDER — MULTIVITAMIN,THERAPEUTIC
1 TABLET ORAL DAILY
Status: DISCONTINUED | OUTPATIENT
Start: 2024-06-10 | End: 2024-06-14 | Stop reason: HOSPADM

## 2024-06-10 RX ORDER — AMOXICILLIN 250 MG
1 CAPSULE ORAL 2 TIMES DAILY PRN
Status: DISCONTINUED | OUTPATIENT
Start: 2024-06-10 | End: 2024-06-14 | Stop reason: HOSPADM

## 2024-06-10 RX ORDER — MAGNESIUM HYDROXIDE/ALUMINUM HYDROXICE/SIMETHICONE 120; 1200; 1200 MG/30ML; MG/30ML; MG/30ML
30 SUSPENSION ORAL EVERY 4 HOURS PRN
Status: DISCONTINUED | OUTPATIENT
Start: 2024-06-10 | End: 2024-06-14 | Stop reason: HOSPADM

## 2024-06-10 RX ORDER — LOPERAMIDE HCL 2 MG
2 CAPSULE ORAL 4 TIMES DAILY PRN
Status: DISCONTINUED | OUTPATIENT
Start: 2024-06-10 | End: 2024-06-14 | Stop reason: HOSPADM

## 2024-06-10 RX ORDER — MAGNESIUM OXIDE 400 MG/1
400 TABLET ORAL DAILY
Status: DISCONTINUED | OUTPATIENT
Start: 2024-06-10 | End: 2024-06-14 | Stop reason: HOSPADM

## 2024-06-10 RX ORDER — DIAZEPAM 5 MG
5-20 TABLET ORAL EVERY 30 MIN PRN
Status: DISCONTINUED | OUTPATIENT
Start: 2024-06-10 | End: 2024-06-13

## 2024-06-10 RX ORDER — TRAZODONE HYDROCHLORIDE 50 MG/1
50 TABLET, FILM COATED ORAL
Status: DISCONTINUED | OUTPATIENT
Start: 2024-06-10 | End: 2024-06-11

## 2024-06-10 RX ADMIN — ATENOLOL 25 MG: 25 TABLET ORAL at 15:05

## 2024-06-10 RX ADMIN — MAGNESIUM OXIDE TAB 400 MG (241.3 MG ELEMENTAL MG) 400 MG: 400 (241.3 MG) TAB at 14:15

## 2024-06-10 RX ADMIN — TRAZODONE HYDROCHLORIDE 50 MG: 50 TABLET ORAL at 20:25

## 2024-06-10 RX ADMIN — DIAZEPAM 10 MG: 5 TABLET ORAL at 16:24

## 2024-06-10 RX ADMIN — THIAMINE HCL TAB 100 MG 100 MG: 100 TAB at 20:25

## 2024-06-10 RX ADMIN — DIAZEPAM 10 MG: 5 TABLET ORAL at 14:24

## 2024-06-10 RX ADMIN — FOLIC ACID 1 MG: 1 TABLET ORAL at 20:24

## 2024-06-10 RX ADMIN — NICOTINE POLACRILEX 4 MG: 4 GUM, CHEWING BUCCAL at 14:24

## 2024-06-10 RX ADMIN — THERA TABS 1 TABLET: TAB at 20:24

## 2024-06-10 RX ADMIN — NICOTINE POLACRILEX 4 MG: 4 GUM, CHEWING BUCCAL at 16:24

## 2024-06-10 RX ADMIN — DIAZEPAM 10 MG: 5 TABLET ORAL at 17:43

## 2024-06-10 RX ADMIN — DIAZEPAM 10 MG: 5 TABLET ORAL at 20:24

## 2024-06-10 RX ADMIN — HYDROXYZINE HYDROCHLORIDE 25 MG: 25 TABLET, FILM COATED ORAL at 20:25

## 2024-06-10 ASSESSMENT — ACTIVITIES OF DAILY LIVING (ADL)
ADLS_ACUITY_SCORE: 40
HYGIENE/GROOMING: INDEPENDENT
LAUNDRY: UNABLE TO COMPLETE
ADLS_ACUITY_SCORE: 35
ADLS_ACUITY_SCORE: 40
DRESS: SCRUBS (BEHAVIORAL HEALTH)
ADLS_ACUITY_SCORE: 37
ORAL_HYGIENE: INDEPENDENT
ADLS_ACUITY_SCORE: 57

## 2024-06-10 ASSESSMENT — COLUMBIA-SUICIDE SEVERITY RATING SCALE - C-SSRS
6. HAVE YOU EVER DONE ANYTHING, STARTED TO DO ANYTHING, OR PREPARED TO DO ANYTHING TO END YOUR LIFE?: NO
4. HAVE YOU HAD THESE THOUGHTS AND HAD SOME INTENTION OF ACTING ON THEM?: NO
3. HAVE YOU BEEN THINKING ABOUT HOW YOU MIGHT KILL YOURSELF?: NO
5. HAVE YOU STARTED TO WORK OUT OR WORKED OUT THE DETAILS OF HOW TO KILL YOURSELF? DO YOU INTEND TO CARRY OUT THIS PLAN?: NO
1. IN THE PAST MONTH, HAVE YOU WISHED YOU WERE DEAD OR WISHED YOU COULD GO TO SLEEP AND NOT WAKE UP?: YES
2. HAVE YOU ACTUALLY HAD ANY THOUGHTS OF KILLING YOURSELF IN THE PAST MONTH?: YES

## 2024-06-10 NOTE — TELEPHONE ENCOUNTER
S: [employer] , Provider Chandan calling at 2:39p with clinical on a 49 year old/Male presenting for alcohol detox.     B: Pt presents for ETOH detox.   Currently reports drinking 1.75 liter vodka daily for 3 weeks.    Patient reports last use was PTA.  Pt ROSE MARY: 0.11  Pt  denies hx of DT  Pt  denies hx of seizures. Last seizure: N/A  Pt endorsing the following symptoms of withdrawal: Shaky, Tremulous, and Tachycardic  MSSA Score: 8, received valium.    Pt denies acute mental health or medical concerns. Pt has a chronic rash that develops when they drink, no concerns for this as it does not appear contagious.   Pt denies other drug use: None Amount/frequency: N/A    Does Pt have a detox care plan in [facility]? No  Does pt present with specific needs, assistive devices, or exclusionary criteria? None  Is the patient ambulating, eating and drinking in the ED? Yes    A: Pt meets criteria to be presented for IP detox admission. Patient is voluntary    COVID Symptoms: No  If yes, COVID test required   Utox: In process  Magnesium: Abnormalities: 1.6 - pt received oral magnesium for replacement.   CMP: Abnormalities: GLUCOSE 106  CBC: Abnormalities: ABSOLUTE LYMPHOCYTES 0.7  HCG: N/A     R: Patient cleared and ready for behavioral bed placement: Yes    Pt is meeting criteria for presentation to 3A/CD    Does Patient need a Transfer Center request created? No, Pt is located within [facility] ED, [facility] ED, or [place] [facility]

## 2024-06-10 NOTE — PROGRESS NOTES
Brief medicine progress note     Notified by nursing that patient has high blood pressures upon admission to detox unit. Patient admitted for detox from alcohol. He has had high blood pressures since presentation. Received atenolol 25 mg in the ED without improvement in blood pressure. He has received a total of 30 mg of valium since admission. No associated chest pain, SOB, headache per nursing.   -Will add clonidine 0.1 mg BID PRN for SBP >170 or DBP > 105   -Full consult to be completed in the AM     Kathryn MICHAELS [facility]  Securely message with the Vocera Web Console (learn more here)  Text page via [facility] Paging/Directory

## 2024-06-10 NOTE — TELEPHONE ENCOUNTER
R:  2:46p Paged ED Detox Psychiatry NP Jermain, awaiting response.   [2:56 PM] Amarilis Aly    MRN [id number] accepted for 3A/detox [facility]    Intake to update work lists.   2:57p Indicia Completed.   2:57p Called Unit 3A CRRAE Victor to inform pt is in queue for the unit. CRN informed shift exchange, pt will admit during evening and 3A will call for report when they can admit pt to the unit. CRN inquired about UDS, Intake informed collected at this time.   3:02p Called [facility] CRRAE Baltazar to inform pt is in queue for the unit. 3A CRN informed shift exchange, pt will admit during evening and 3A will call for report when they can admit pt to the unit.   3:03p Intake notes all work lists updated with pt's acceptance.

## 2024-06-10 NOTE — ED PROVIDER NOTES
ED Provider Note  [facility]      History     Chief Complaint   Patient presents with    Alcohol Intoxication     States his last drink was 0500 this morning.  Drinks 175 l vodka.  No history of seizures.  No other drugs.     HPI  Jose Alberto Cuellar is a 49 year old male with a history of severe alcohol use disorder with withdrawal, opioid dependence, anxiety, and depression presents to the emergency department seeking detox.    Patient currently drinks approximately 1.75 L of vodka per day.  Patient states he drinks as much as he can until he passes out, wakes up the next day and does it again.  Patient was at [facility] 6 months ago, however states he did not last long and began drinking again.  Patient was on Antabuse, and states it was working well while he was taking it.  Last drink was earlier this morning.  Patient states he gets shakes, sweats, anxiety, and confusion with withdrawal, denies seizure or DT history.  Patient is stable in his antidepressants patient also has a rash on his arms, legs, and beltline that itch and burn, then come back when he is drinking.  Patient states rash goes away when he stops drinking.  Occurred multiple times.  Has been treated successfully with triamcinolone cream in the past.  Denies any contagious exposures.  He denies any rash on his hands.    Past Medical History  Past Medical History:   Diagnosis Date    Depressive disorder     Substance abuse (H)     Thyroid disease      History reviewed. No pertinent surgical history.  disulfiram (ANTABUSE) 250 MG tablet  folic acid (FOLVITE) 1 MG tablet  hydrOXYzine HCl (ATARAX) 25 MG tablet  levothyroxine (SYNTHROID/LEVOTHROID) 75 MCG tablet  multivitamin w/minerals (THERA-VIT-M) tablet  nicotine (NICODERM CQ) 21 MG/24HR 24 hr patch  thiamine (B-1) 100 MG tablet  traZODone (DESYREL) 50 MG tablet  venlafaxine (EFFEXOR XR) 75 MG 24 hr capsule      Allergies   Allergen Reactions    Penicillins  "Angioedema and Rash     Converted from Drug Class Allergy: Penicillins     Family History  No family history on file.  Social History   Social History     Tobacco Use    Smoking status: Every Day     Types: Cigarettes    Smokeless tobacco: Never   Vaping Use    Vaping status: Never Used   Substance Use Topics    Alcohol use: Yes     Comment: Vodka    Drug use: Not Currently      A medically appropriate review of systems was performed with pertinent positives and negatives noted in the HPI, and all other systems negative.    Physical Exam   BP: (!) 160/99  Pulse: 103  Temp: 98.5  F (36.9  C)  Resp: 18  Height: 175.3 cm (5' 9\")  Weight: 102.2 kg (225 lb 6.4 oz)  SpO2: 96 %  Physical Exam  Vitals and nursing note reviewed.   Constitutional:       General: He is not in acute distress.     Appearance: Normal appearance. He is not toxic-appearing.   HENT:      Head: Atraumatic.   Eyes:      General: No scleral icterus.     Conjunctiva/sclera: Conjunctivae normal.   Cardiovascular:      Rate and Rhythm: Normal rate.      Heart sounds: Normal heart sounds.   Pulmonary:      Effort: Pulmonary effort is normal. No respiratory distress.      Breath sounds: Normal breath sounds.   Abdominal:      Palpations: Abdomen is soft.      Tenderness: There is no abdominal tenderness.   Musculoskeletal:         General: No deformity.      Cervical back: Neck supple.   Skin:     General: Skin is warm.      Findings: Rash present.      Comments: Papulovesicular excoriated rash on the forearms, anterior shins, buttocks.  No obvious involvement of hands or intertriginous spaces.   Neurological:      General: No focal deficit present.      Mental Status: He is alert and oriented to person, place, and time.           ED Course, Procedures, & Data      Procedures                 Results for orders placed or performed during the hospital encounter of 06/10/24   Comprehensive metabolic panel     Status: Abnormal   Result Value Ref Range    Sodium " 141 135 - 145 mmol/L    Potassium 3.6 3.4 - 5.3 mmol/L    Carbon Dioxide (CO2) 25 22 - 29 mmol/L    Anion Gap 13 7 - 15 mmol/L    Urea Nitrogen 13.9 6.0 - 20.0 mg/dL    Creatinine 0.76 0.67 - 1.17 mg/dL    GFR Estimate >90 >60 mL/min/1.73m2    Calcium 9.6 8.6 - 10.0 mg/dL    Chloride 103 98 - 107 mmol/L    Glucose 106 (H) 70 - 99 mg/dL    Alkaline Phosphatase 69 40 - 150 U/L    AST 30 0 - 45 U/L    ALT 31 0 - 70 U/L    Protein Total 7.0 6.4 - 8.3 g/dL    Albumin 4.3 3.5 - 5.2 g/dL    Bilirubin Total <0.2 <=1.2 mg/dL   Magnesium     Status: Abnormal   Result Value Ref Range    Magnesium 1.6 (L) 1.7 - 2.3 mg/dL   Ethyl Alcohol Level     Status: Abnormal   Result Value Ref Range    Alcohol ethyl 0.11 (H) <=0.01 g/dL   CBC with platelets and differential     Status: Abnormal   Result Value Ref Range    WBC Count 4.3 4.0 - 11.0 10e3/uL    RBC Count 4.49 4.40 - 5.90 10e6/uL    Hemoglobin 14.7 13.3 - 17.7 g/dL    Hematocrit 42.9 40.0 - 53.0 %    MCV 96 78 - 100 fL    MCH 32.7 26.5 - 33.0 pg    MCHC 34.3 31.5 - 36.5 g/dL    RDW 14.2 10.0 - 15.0 %    Platelet Count 179 150 - 450 10e3/uL    % Neutrophils 68 %    % Lymphocytes 16 %    % Monocytes 13 %    % Eosinophils 1 %    % Basophils 1 %    % Immature Granulocytes 1 %    NRBCs per 100 WBC 0 <1 /100    Absolute Neutrophils 3.0 1.6 - 8.3 10e3/uL    Absolute Lymphocytes 0.7 (L) 0.8 - 5.3 10e3/uL    Absolute Monocytes 0.6 0.0 - 1.3 10e3/uL    Absolute Eosinophils 0.0 0.0 - 0.7 10e3/uL    Absolute Basophils 0.0 0.0 - 0.2 10e3/uL    Absolute Immature Granulocytes 0.0 <=0.4 10e3/uL    Absolute NRBCs 0.0 10e3/uL   CBC with platelets differential     Status: Abnormal    Narrative    The following orders were created for panel order CBC with platelets differential.  Procedure                               Abnormality         Status                     ---------                               -----------         ------                     CBC with platelets and d...[[id number]]   Abnormal            Final result                 Please view results for these tests on the individual orders.   Urine Drug Screen     Status: None ()    Narrative    The following orders were created for panel order Urine Drug Screen.  Procedure                               Abnormality         Status                     ---------                               -----------         ------                     Urine Drug Screen Panel[[id number]]                                                       Please view results for these tests on the individual orders.     Medications   diazepam (VALIUM) tablet 5-20 mg (10 mg Oral $Given 6/10/24 2070)   magnesium oxide (MAG-OX) tablet 400 mg (400 mg Oral $Given 6/10/24 9778)   atenolol (TENORMIN) tablet 25 mg (has no administration in time range)   nicotine polacrilex (NICORETTE) gum 4 mg (4 mg Buccal $Given 6/10/24 1424)     Labs Ordered and Resulted from Time of ED Arrival to Time of ED Departure   COMPREHENSIVE METABOLIC PANEL - Abnormal       Result Value    Sodium 141      Potassium 3.6      Carbon Dioxide (CO2) 25      Anion Gap 13      Urea Nitrogen 13.9      Creatinine 0.76      GFR Estimate >90      Calcium 9.6      Chloride 103      Glucose 106 (*)     Alkaline Phosphatase 69      AST 30      ALT 31      Protein Total 7.0      Albumin 4.3      Bilirubin Total <0.2     MAGNESIUM - Abnormal    Magnesium 1.6 (*)    ETHYL ALCOHOL LEVEL - Abnormal    Alcohol ethyl 0.11 (*)    CBC WITH PLATELETS AND DIFFERENTIAL - Abnormal    WBC Count 4.3      RBC Count 4.49      Hemoglobin 14.7      Hematocrit 42.9      MCV 96      MCH 32.7      MCHC 34.3      RDW 14.2      Platelet Count 179      % Neutrophils 68      % Lymphocytes 16      % Monocytes 13      % Eosinophils 1      % Basophils 1      % Immature Granulocytes 1      NRBCs per 100 WBC 0      Absolute Neutrophils 3.0      Absolute Lymphocytes 0.7 (*)     Absolute Monocytes 0.6      Absolute Eosinophils 0.0      Absolute  Basophils 0.0      Absolute Immature Granulocytes 0.0      Absolute NRBCs 0.0     URINE DRUG SCREEN PANEL     No orders to display          Critical care was not performed.     Medical Decision Making  The patient's presentation was of high complexity (a chronic illness severe exacerbation, progression, or side effect of treatment).    The patient's evaluation involved:  review of external note(s) from 1 sources (reviewed notes from detox admission February 2024)  review of 3+ test result(s) ordered prior to this encounter (reviewed prior labs for comparison to today)  ordering and/or review of 3+ test(s) in this encounter (see separate area of note for details)    The patient's management necessitated moderate risk (prescription drug management including medications given in the ED) and high risk (a decision regarding hospitalization).    Assessment & Plan    49-year-old male who presents seeking detox from alcohol.  He has a history of complicated alcohol withdrawal though denies a history of DTs or seizures.  Presents with alcohol level 0.11.  Magnesium is slightly low, was replaced orally, his other labs are unremarkable.  He did have an MSSA score which indicated treatment with oral Valium.  We will continue to monitor on MSSA protocol.  He has a papular excoriated rash on his forearms, anterior shins, buttocks and torso.  Differential diagnosis includes dermatitis herpetiformis, prurigo nodularis, atopic dermatitis, contact dermatitis, scabies, lichen simplex chronicus, psoriasis, drug eruption, less likely vasculitis or viral exanthem.  He has no history of contagious exposure, states he has developed a similar rash while over consuming alcohol in the past, and no close contacts have been affected with a rash making an infectious rash much less likely.  He has never had to be treated for contagious rash such as scabies or viral exanthem.  Clinical appearance most consistent with allergic dermatitis or  dermatitis herpetiformis.  His blood pressure is somewhat elevated (review of chart has been elevated in the past when in alcohol withdrawal).  Will continue to treat be MSSA protocol, and atenolol 25 mg, continue to monitor.  Patient appears to be an appropriate candidate for voluntary detox admission.      I have reviewed the nursing notes. I have reviewed the findings, diagnosis, plan and need for follow up with the patient.    New Prescriptions    No medications on file       Final diagnoses:   Alcohol dependence with uncomplicated withdrawal (H)   Atopic neurodermatitis   I, Brett Goodrich, am serving as a trained medical scribe to document services personally performed by Stevie Hawley MD, based to the provider's statements to me.     I, Stevie Hawley MD, was physically present and have reviewed and verified the accuracy of this note documented by Brett Goodrich.       Stevie Hawley MD  [facility] EMERGENCY DEPARTMENT  6/10/2024     Stevie Hawley MD  06/10/24 1829

## 2024-06-10 NOTE — PLAN OF CARE
"[place] [facility] [place]. Reported drinking 1.75 L hard alcohol daily with last use 0500 am today. Also smoking 1-2 PPD cigarettes. He denied other substance use. Last admit to detox was the end of February, 2024. Discharged home and was sober with use of Antabuse for about 6 weeks, then relapsed 6 weeks ago. He is interested in [facility], which he last attended about 5 months ago. He had a chemical dependency assessment scheduled outpatient for tomorrow 7:30 am prior to coming in for admission. He also had an internal medicine outpatient appointment for tomorrow morning that was cancelled due to admission. He reported he got a DUI about two weeks ago.   On admission, he appeared slightly unkempt. Affect restricted. Mood reported as mildly anxious and depressed. He denied any current suicidal ideation or passive wishes to be dead. Rash noted in ER assessment: \"Papulovesicular excoriated rash on the forearms, anterior shins, buttocks\". Patient reported triamcinolone cream had worked for rash previously. MSSA scores this shift 8, 11 and 9. Blood pressure elevated to 175/111 after one-time dose atenolol 25 mg and diazepam in ER given. On recheck, BP was 174/103. Patient denied any symptoms of chest pain at that time. Paged internal medicine to inform of elevated BP at 1742. Prn order for clonidine placed. Repeat /93, thus clonidine not given as BP did not meet parameters. Prn hydroxyzine given for itching from rash and anxiety along with prn trazodone for sleep at 2030.    Problem: Excessive Substance Use  Goal: Optimized Energy Level (Excessive Substance Use)  Outcome: Not Progressing  Goal: Improved Physiologic Symptoms (Excessive Substance Use)  Outcome: Not Progressing  Goal: Enhanced Social, Occupational or Functional Skills (Excessive Substance Use)  Intervention: Promote Social, Occupational and Functional Ability  Recent Flowsheet Documentation  Taken 6/10/2024 1621 by " Kylee Pineda, RN  Trust Relationship/Rapport:   care explained   choices provided   emotional support provided   questions answered   questions encouraged   reassurance provided   thoughts/feelings acknowledged   Goal Outcome Evaluation:    Plan of Care Reviewed With: patient

## 2024-06-10 NOTE — PROGRESS NOTES
Storage Bin: Shoes, shorts, cap and shirt.   MED Bin Room: Phone, wallet, pack of cigarettes and lighter. Envelop Number: [id number]: 2 ID, 4 Master Card, 4 Visa Card.   A               Admission:  I am responsible for any personal items that are not sent to the safe or pharmacy.  [facility] is not responsible for loss, theft or damage of any property in my possession.    Signature:  _________________________________ Date: _______  Time: _____                                              Staff Signature:  ____________________________ Date: ________  Time: _____      2nd Staff person, if patient is unable/unwilling to sign:    Signature: ________________________________ Date: ________  Time: _____     Discharge:  [facility] has returned all of my personal belongings:    Signature: _________________________________ Date: ________  Time: _____                                          Staff Signature:  ____________________________ Date: ________  Time: _____

## 2024-06-11 LAB
CHOLEST SERPL-MCNC: 288 MG/DL
GGT SERPL-CCNC: 24 U/L (ref 8–61)
HDLC SERPL-MCNC: 68 MG/DL
HOLD SPECIMEN: NORMAL
LDLC SERPL CALC-MCNC: 155 MG/DL
MAGNESIUM SERPL-MCNC: 1.6 MG/DL (ref 1.7–2.3)
NONHDLC SERPL-MCNC: 220 MG/DL
TRIGL SERPL-MCNC: 324 MG/DL
TSH SERPL DL<=0.005 MIU/L-ACNC: 1.68 UIU/ML (ref 0.3–4.2)

## 2024-06-11 PROCEDURE — 250N000013 HC RX MED GY IP 250 OP 250 PS 637

## 2024-06-11 PROCEDURE — 250N000013 HC RX MED GY IP 250 OP 250 PS 637: Performed by: NURSE PRACTITIONER

## 2024-06-11 PROCEDURE — 99223 1ST HOSP IP/OBS HIGH 75: CPT | Mod: AI | Performed by: NURSE PRACTITIONER

## 2024-06-11 PROCEDURE — 250N000013 HC RX MED GY IP 250 OP 250 PS 637: Performed by: PHYSICIAN ASSISTANT

## 2024-06-11 PROCEDURE — 250N000013 HC RX MED GY IP 250 OP 250 PS 637: Performed by: PSYCHIATRY & NEUROLOGY

## 2024-06-11 PROCEDURE — 84443 ASSAY THYROID STIM HORMONE: CPT | Performed by: NURSE PRACTITIONER

## 2024-06-11 PROCEDURE — 128N000004 HC R&B CD ADULT

## 2024-06-11 PROCEDURE — 36415 COLL VENOUS BLD VENIPUNCTURE: CPT | Performed by: NURSE PRACTITIONER

## 2024-06-11 PROCEDURE — 80061 LIPID PANEL: CPT | Performed by: NURSE PRACTITIONER

## 2024-06-11 PROCEDURE — 82977 ASSAY OF GGT: CPT | Performed by: NURSE PRACTITIONER

## 2024-06-11 PROCEDURE — 250N000013 HC RX MED GY IP 250 OP 250 PS 637: Performed by: FAMILY MEDICINE

## 2024-06-11 PROCEDURE — 99253 IP/OBS CNSLTJ NEW/EST LOW 45: CPT

## 2024-06-11 PROCEDURE — 83735 ASSAY OF MAGNESIUM: CPT

## 2024-06-11 RX ORDER — NICOTINE 21 MG/24HR
1 PATCH, TRANSDERMAL 24 HOURS TRANSDERMAL DAILY
Status: DISCONTINUED | OUTPATIENT
Start: 2024-06-11 | End: 2024-06-14 | Stop reason: HOSPADM

## 2024-06-11 RX ORDER — LEVOTHYROXINE SODIUM 75 UG/1
75 TABLET ORAL DAILY
Status: DISCONTINUED | OUTPATIENT
Start: 2024-06-11 | End: 2024-06-14 | Stop reason: HOSPADM

## 2024-06-11 RX ORDER — TRIAMCINOLONE ACETONIDE 5 MG/G
CREAM TOPICAL 2 TIMES DAILY
Status: DISCONTINUED | OUTPATIENT
Start: 2024-06-11 | End: 2024-06-14 | Stop reason: HOSPADM

## 2024-06-11 RX ORDER — MINERAL OIL/HYDROPHIL PETROLAT
OINTMENT (GRAM) TOPICAL
Status: DISCONTINUED | OUTPATIENT
Start: 2024-06-11 | End: 2024-06-14 | Stop reason: HOSPADM

## 2024-06-11 RX ORDER — HYDRALAZINE HYDROCHLORIDE 25 MG/1
25 TABLET, FILM COATED ORAL 3 TIMES DAILY PRN
Status: DISCONTINUED | OUTPATIENT
Start: 2024-06-11 | End: 2024-06-14 | Stop reason: HOSPADM

## 2024-06-11 RX ORDER — TRAZODONE HYDROCHLORIDE 50 MG/1
50 TABLET, FILM COATED ORAL AT BEDTIME
Status: DISCONTINUED | OUTPATIENT
Start: 2024-06-11 | End: 2024-06-14 | Stop reason: HOSPADM

## 2024-06-11 RX ADMIN — NICOTINE POLACRILEX 4 MG: 4 GUM, CHEWING BUCCAL at 09:24

## 2024-06-11 RX ADMIN — LEVOTHYROXINE SODIUM 75 MCG: 75 TABLET ORAL at 11:01

## 2024-06-11 RX ADMIN — HYDROXYZINE HYDROCHLORIDE 25 MG: 25 TABLET, FILM COATED ORAL at 12:34

## 2024-06-11 RX ADMIN — HYDRALAZINE HYDROCHLORIDE 25 MG: 25 TABLET ORAL at 12:34

## 2024-06-11 RX ADMIN — NICOTINE 1 PATCH: 21 PATCH, EXTENDED RELEASE TRANSDERMAL at 09:26

## 2024-06-11 RX ADMIN — THERA TABS 1 TABLET: TAB at 09:24

## 2024-06-11 RX ADMIN — FOLIC ACID 1 MG: 1 TABLET ORAL at 09:24

## 2024-06-11 RX ADMIN — THIAMINE HCL TAB 100 MG 100 MG: 100 TAB at 09:24

## 2024-06-11 RX ADMIN — TRAZODONE HYDROCHLORIDE 50 MG: 50 TABLET ORAL at 21:04

## 2024-06-11 RX ADMIN — DIAZEPAM 10 MG: 5 TABLET ORAL at 15:58

## 2024-06-11 RX ADMIN — VENLAFAXINE HYDROCHLORIDE 225 MG: 150 CAPSULE, EXTENDED RELEASE ORAL at 09:24

## 2024-06-11 RX ADMIN — DIAZEPAM 10 MG: 5 TABLET ORAL at 12:34

## 2024-06-11 RX ADMIN — TRIAMCINOLONE ACETONIDE: 5 CREAM TOPICAL at 21:04

## 2024-06-11 RX ADMIN — DIAZEPAM 5 MG: 5 TABLET ORAL at 20:31

## 2024-06-11 RX ADMIN — CLONIDINE HYDROCHLORIDE 0.1 MG: 0.1 TABLET ORAL at 20:31

## 2024-06-11 RX ADMIN — MAGNESIUM OXIDE TAB 400 MG (241.3 MG ELEMENTAL MG) 400 MG: 400 (241.3 MG) TAB at 09:24

## 2024-06-11 RX ADMIN — HYDROXYZINE HYDROCHLORIDE 25 MG: 25 TABLET, FILM COATED ORAL at 09:24

## 2024-06-11 ASSESSMENT — ACTIVITIES OF DAILY LIVING (ADL)
ADLS_ACUITY_SCORE: 40
DRESS: INDEPENDENT
ADLS_ACUITY_SCORE: 40
ORAL_HYGIENE: INDEPENDENT
HYGIENE/GROOMING: INDEPENDENT
ADLS_ACUITY_SCORE: 40
LAUNDRY: WITH SUPERVISION
ADLS_ACUITY_SCORE: 40
HYGIENE/GROOMING: INDEPENDENT
ADLS_ACUITY_SCORE: 40

## 2024-06-11 NOTE — PROGRESS NOTES
Patient was added to the LP Waitlist. Writer will check in with patient about aftercare today.    0 = swallows foods/liquids without difficulty

## 2024-06-11 NOTE — H&P
History and Physical    Jose Alberto Cuellar MRN# [id number]   Age: 49 year old YOB: 1975     Date of Admission:  6/10/2024          Contacts:     PCP - CAROLYN Whatley, CNP - [facility]          Diagnoses:     Alcohol use disorder, severe, dependence  Alcohol withdrawal  Nicotine use disorder  History of polysubstance abuse  Generalized anxiety disorder  Major depressive disorder versus alcohol-induced depressive disorder  Hypothyroidism  Papulovesicular excoriated rash on the forearms, anterior shins, buttocks   Hypomagnesemia  Hyperlipidemia  Elevated blood pressure         Recommendations:     Admit to Unit: [address] [place] [address]    Attending Physician: Dr. Bauer, under the direct care of Veronica Sims NP    Patient is voluntary.    Routine lab studies have been requested.    Monitor for target symptoms.     Provide a safe environment and therapeutic milieu.     MSSA with Valium.    Medications:  Hold Antabuse until out of detox.  Continue Effexor  mg daily.  Continue PRN Hydroxyzine 25 mg.  Continue Trazodone 50 mg at HS.    He is interested in treatment at [facility].      Attestation:  Patient has been seen and evaluated by me, Nesha Sims, CAROLYN CNP  The patient was counseled on nature of illness and treatment plan/options  Care was coordinated with treatment team         Chief Complaint:     History is obtained from the patient and electronic health record.  ED notes, outpatient records and records from previous  hospitalizations were reviewed.    Alcohol withdrawal         History of Present Illness:        Jose Alberto Cuellar is a 49-year-old male admitted to [address] on 6/10/2024.  He was admitted as a voluntary patient through the ED due to alcohol use disorder and withdrawal.  He was most recently on 3A detox in 2/2024.  He was prescribed Antabuse and found it beneficial.  As time went on, he drank alcohol a few times, and then about a month  "ago his use escalated.  He was consuming 1.75 L of vodka daily.  He states he drinks until he passes out.  He had a DUI 3 weeks ago.  Breathalyzer was 0.11.  UTOX was negative.  He was taking Effexor and Synthroid as prescribed prior to admission.  He was not taking his other medications.           Psychiatric Review of Systems:     His mood has been depressed.  He reports that he has had some passive suicidal thoughts when he is intoxicated, but not currently.  He has anhedonia.  When he is consuming alcohol he passes out.  When he is sober, his sleep is \"not good.\"  His appetite is good.  His motivation is rather low.  His energy is low.  He has some feelings of hopelessness, helplessness, worthlessness and guilt.  Concentration is \"not great.\"  His anxiety is high.  He rarely feels irritable.   He feels restless.  He has racing thoughts.  He denies panic attacks.  He denies symptoms consistent with OCD, PTSD, psychosis and tamica.              Medical Review of Systems:     He reports mild tremor and feeling sweaty.  He has rashes on his arms, legs that is itchy and around his waist and back which is a burning sensation.  A 10-point review of systems was completed and is otherwise negative with the exception of HPI.            Psychiatric History:     He has a history of depression and anxiety.  He often feels depressed during winter.  He has no history of psychiatric hospitalizations.  He denies any history of suicide attempts, though stated that in the past he would consume large amounts of alcohol and then put his head between the couch cushions and hope he would vomit and suffocate.   In the past he has taken Zoloft 100 mg and is unsure whether it was beneficial.  \"Working out, being outside, sleeping all does way better than the antidepressants do.\"   He has been taking Effexor XR, Hydroxyzine and Trazodone.  He has never seen a therapist.             Substance Use History:     Alcohol use became problematic " "when he was 20.  He was consuming 1.75 L of vodka daily.  He reports progressive use, loss of control, continued use in spite of consequences, tolerance, withdrawal, cravings, and consuming more and longer than intended.  He denies any history of withdrawal seizures or DTs.  Longest period of sobriety is 2 years.  He has never taken Campral or Naltrexone.  Antabuse was effective.  He has a history of prior admissions to  detox, most recently in 2/2024.  He has attended  in the past.  He has a history of treatment at [facility], [facility], and a few outpatient programs.  He smokes 1-2 packs of cigarettes daily.  He reports he used to do \"a lot of drugs, I quit when I was 38.\"  These include cocaine, meth and opioids.  He denies any history of IV drug use.  He snorted what he assumes was Fentanyl with a neighbor, resulting in an unintentional opioid overdose 12/31/2023.  He had 2 DUIs in his early 20's and had a DUI 3 weeks ago.           Past Medical History:     Hypothyroidism  Hypogonadism  Hyperlipidemia  Hypertension    No history of seizures or head injuries.         Past Surgical History:     None         Allergies:      Penicillin - angioedema & rash           Medications:      disulfiram (ANTABUSE) 250 MG tablet Take 1 tablet (250 mg) by mouth daily    folic acid (FOLVITE) 1 MG tablet Take 1 tablet (1 mg) by mouth daily    hydrOXYzine HCl (ATARAX) 25 MG tablet Take 1 tablet (25 mg) by mouth every 4 hours as needed for anxiety    levothyroxine (SYNTHROID/LEVOTHROID) 75 MCG tablet Take 75 mcg by mouth daily    multivitamin w/minerals (THERA-VIT-M) tablet Take 1 tablet by mouth daily    nicotine (NICODERM CQ) 21 MG/24HR 24 hr patch Place 1 patch onto the skin daily    thiamine (B-1) 100 MG tablet Take 1 tablet (100 mg) by mouth daily    traZODone (DESYREL) 50 MG tablet Take 1 tablet (50 mg) by mouth at bedtime    venlafaxine (EFFEXOR XR) 75 MG 24 hr capsule Take 3 capsules (225 mg) by mouth daily " (with breakfast)          Social History:     He grew up in [place], [place].  He was raised by his parents.  He denies any history of abuse.  Highest level of education is some college.  He works pouring concrete walls.   He lives alone in a rental property.  He does not have children. He had 2 DUIs in his early 20's.  He had a DUI 3 weeks ago.            Family History:     His sister has a history of substance abuse and is in recovery.  His father has a history of depression.  No known family history of suicides.           Labs:      Latest Reference Range & Units 06/10/24 13:29 06/10/24 14:25 06/11/24 07:32   Sodium 135 - 145 mmol/L 141     Potassium 3.4 - 5.3 mmol/L 3.6     Chloride 98 - 107 mmol/L 103     Carbon Dioxide (CO2) 22 - 29 mmol/L 25     Urea Nitrogen 6.0 - 20.0 mg/dL 13.9     Creatinine 0.67 - 1.17 mg/dL 0.76     GFR Estimate >60 mL/min/1.73m2 >90     Calcium 8.6 - 10.0 mg/dL 9.6     Anion Gap 7 - 15 mmol/L 13     Magnesium 1.7 - 2.3 mg/dL 1.6 (L)  1.6 (L)   Albumin 3.5 - 5.2 g/dL 4.3     Protein Total 6.4 - 8.3 g/dL 7.0     Alkaline Phosphatase 40 - 150 U/L 69     ALT 0 - 70 U/L 31     AST 0 - 45 U/L 30     Bilirubin Total <=1.2 mg/dL <0.2     Cholesterol <200 mg/dL   288 (H)   GGT 8 - 61 U/L   24   Glucose 70 - 99 mg/dL 106 (H)     HDL Cholesterol >=40 mg/dL   68   LDL Cholesterol Calculated <=100 mg/dL   155 (H)   Non HDL Cholesterol <130 mg/dL   220 (H)   Triglycerides <150 mg/dL   324 (H)   TSH 0.30 - 4.20 uIU/mL   1.68   WBC 4.0 - 11.0 10e3/uL 4.3     Hemoglobin 13.3 - 17.7 g/dL 14.7     Hematocrit 40.0 - 53.0 % 42.9     Platelet Count 150 - 450 10e3/uL 179     RBC Count 4.40 - 5.90 10e6/uL 4.49     MCV 78 - 100 fL 96     MCH 26.5 - 33.0 pg 32.7     MCHC 31.5 - 36.5 g/dL 34.3     RDW 10.0 - 15.0 % 14.2     % Neutrophils % 68     % Lymphocytes % 16     % Monocytes % 13     % Eosinophils % 1     % Basophils % 1     Absolute Basophils 0.0 - 0.2 10e3/uL 0.0     Absolute Eosinophils 0.0 - 0.7 10e3/uL 0.0  "    Absolute Immature Granulocytes <=0.4 10e3/uL 0.0     Absolute Lymphocytes 0.8 - 5.3 10e3/uL 0.7 (L)     Absolute Monocytes 0.0 - 1.3 10e3/uL 0.6     % Immature Granulocytes % 1     Absolute Neutrophils 1.6 - 8.3 10e3/uL 3.0     Absolute NRBCs 10e3/uL 0.0     NRBCs per 100 WBC <1 /100 0     Amphetamine Qual Urine Screen Negative   Screen Negative    Fentanyl Qual Urine Screen Negative   Screen Negative    Cocaine Urine Screen Negative   Screen Negative    Benzodiazepine Urine Screen Negative   Screen Negative    Opiates Qualitative Urine Screen Negative   Screen Negative    PCP Urine Screen Negative   Screen Negative    Cannabinoids Qual Urine Screen Negative   Screen Negative    Barbiturates Qual Urine Screen Negative   Screen Negative    Alcohol ethyl <=0.01 g/dL 0.11 (H)            Psychiatric Examination:     Appearance:  awake, alert, fair grooming, and dressed in hospital scrubs  Attitude:  cooperative  Eye Contact:  good  Mood:  anxious, depressed  Affect:  appropriate and in normal range  Speech:  clear, coherent  Psychomotor Behavior:  no evidence of tardive dyskinesia, dystonia, or tics, mild tremor observed  Thought Process:  linear and goal oriented  Associations:  no loose associations  Thought Content:  no evidence of suicidal ideation or homicidal ideation and no evidence of psychotic thought  Insight:  fair  Judgment:  fair  Oriented to:  date, time, person, and place  Attention Span and Concentration:  fair, reports some impairment  Recent and Remote Memory:   fair to good  Language:  intact, fluent English  Fund of Knowledge:  appropriate  Muscle Strength and Tone:  normal  Gait and Station:   normal      BP (!) 145/78 (BP Location: Right arm, Patient Position: Supine, Cuff Size: Adult Large)   Pulse 56   Temp 97.7  F (36.5  C) (Oral)   Resp 16   Ht 1.753 m (5' 9\")   Wt 102.1 kg (225 lb)   SpO2 96%   BMI 33.23 kg/m           Physical Exam:     Please refer to the physical exam completed " by Dr. Hawley in the ED on 6/10/2024:    Constitutional:       General: He is not in acute distress.     Appearance: Normal appearance. He is not toxic-appearing.   HENT:      Head: Atraumatic.   Eyes:      General: No scleral icterus.     Conjunctiva/sclera: Conjunctivae normal.   Cardiovascular:      Rate and Rhythm: Normal rate.      Heart sounds: Normal heart sounds.   Pulmonary:      Effort: Pulmonary effort is normal. No respiratory distress.      Breath sounds: Normal breath sounds.   Abdominal:      Palpations: Abdomen is soft.      Tenderness: There is no abdominal tenderness.   Musculoskeletal:         General: No deformity.      Cervical back: Neck supple.   Skin:     General: Skin is warm.      Findings: Rash present.      Comments: Papulovesicular excoriated rash on the forearms, anterior shins, buttocks.  No obvious involvement of hands or intertriginous spaces.   Neurological:      General: No focal deficit present.      Mental Status: He is alert and oriented to person, place, and time.

## 2024-06-11 NOTE — CONSULTS
"[facility]  Consult Note - Hospitalist Service  Date of Admission:  6/10/2024  Consult Requested by: CAROLYN Dennis CNP   Reason for Consult: \"detox\"     Assessment & Plan   Jose Alberto Cuellra is a 49 year old male admitted on 6/10/2024. He has a PMHx notable for alcohol use disorder, opioid dependence, hypothyroidism, anxiety, and depression. He presented to the ED seeking detox. He is currently admitted to Station 3A.     Alcohol use disorder  Acute alcohol withdrawal  Patient reports drinking 1.75L of vodka daily. Last drink was morning of ED arrival. Denies history of withdrawal seizures and DT's.   - psychiatry primary   - agree with vitamin supplementation including of thiamine and folate  - continue MSSA with valium  - PRN Tylenol, Maalox, atenolol, hydroxyzine, Zyprexa, and trazodone  - counseling, group therapies, community resources  - PTA regimen: disulfiram 250 mg tab daily     Anxiety  Depression  - psychiatry primary   - PTA regimen: atarax 25 mg q4h PRN, trazodone 50 mg at bedtime, effexor  mg daily     Papulovesicular excoriated rash   Patient says he gets this type of rash when he is drinking and then goes away when he stops drinking. The rash is located on his arms, legs, and belt-line (per chart review, locations seem to be consistent each time the rash appears). He says that the rash itches and burns. Has been treated successfully with triamcinolone cream in the past. Per chart review, he has been prescribed triamcinolone for this rash in the past. Denies contagious exposures. Ddx include but not limited to atopic dermatitis, contact dermitis, dermatitis herpetiformis, psoriasis, drug eruption.  - start triamcinolone cream and Aquaphor   - follow up with PCP upon discharge     Hypomagnesemia  Mg 1.6 upon ED arrival, started on Mag-Ox 400 mg daily. Repeat Mg today reveals result of 1.6. Based on the Standard electrolyte replacement " protocol, no replacement is needed at this time.   - encourage PO intake     Hypertriglyceridemia   Abnormal lipid panel   Per chart review, the patient has a history of high triglycerides. He was seen by his PCP in March 2024 and it was recommended he start taking fish oil with a recheck of triglycerides in 3 months (which would be around this time). Lipid panel showing cholesterol 288, HDL 68, , non-, triglycerides 324. According to the ASCVD 2013 Risk Calculator from AHA/ACC, the patient has a 6.6% risk of cardiovascular event in the next 10 years. Based on this, a moderate intensity statin is recommended. He would like to hold off on starting fish oil and a statin at this time, would like to get through acute detox first. We discussed the importance of following up with his PCP, as well as lifestyle modifications.   - follow up with PCP upon discharge, especially to determine necessity of statin initiation   - encourage lifestyle modification including healthy diet, exercise, and alcohol cessation     Elevated blood pressure  SBP of 130-170s. Patient does not have a history of hypertension nor do they take any medications for this issue at home. I suspect this is due to the acute alcohol withdrawal process and will normalize as the withdrawal resolves. Per chart review, has required PRN hydralazine in the past during acute withdrawal periods.   - start hydralazine 25 mg TID PRN for SBP > 170  - if BP fails to normalize despite completion of withdrawal, patient should follow-up with PCP within 1 week of discharge     Hypothyroidism  TSH 1.68.   - continue PTA levothyroxine     Patient is stable at this time, Internal Medicine will sign off. Please reach out with any future questions or concerns.     Rose Marie Brewer DNP, ACNPC-AG  Internal Medicine [facility]  [facility]        Clinically Significant Risk Factors Present on Admission            # Hypomagnesemia: Lowest Mg = 1.6 mg/dL  "in last 2 days, will replace as needed                   # Obesity: Estimated body mass index is 33.23 kg/m  as calculated from the following:    Height as of this encounter: 1.753 m (5' 9\").    Weight as of this encounter: 102.1 kg (225 lb).              Rose Marie Brewer NP  Hospitalist Service  Securely message with [employer] (more info)  Text page via [facility] Paging/Directory   ______________________________________________________________________    Chief Complaint   Detox    History is obtained from the patient and electronic health record    History of Present Illness   Jose Alberto Cuellar is a 49 year old male admitted on 6/10/2024. He has a PMHx notable for alcohol use disorder, opioid dependence, hypothyroidism, anxiety, and depression. He presented to the ED seeking detox. He is currently admitted to Station 3A.     Jose Alberto was seen resting is his room. He says that he feels okay today. His largest complaint is his rash on his legs and arms. He confirms that the rash tends to appear when he is drinking and goes away when he stops. Has tried triamcinolone cream in the past that he says partially takes care of the issue. He is open to using this cream again, as well as trying some moisturizing lotion to help with his dry skin/flakiness noted on his extremities. We discuss his lab results, specifically his lipid panel. He says that he hasn't started a fish oil yet and would like to wait until his acute detox period is over. We also discuss the importance of healthy lifestyle, including diet, exercise, and alcohol cessation. Additionally, we discussed the importance of following up with his PCP to make the decision regarding initiation of fish oil and/or statin. He confirms understanding of this. He has no further questions or concerns at this time.       Past Medical History    Past Medical History:   Diagnosis Date    Depressive disorder     Substance abuse (H)     Thyroid disease        Past Surgical History "   History reviewed. No pertinent surgical history.    Medications   Medications Prior to Admission   Medication Sig Dispense Refill Last Dose    disulfiram (ANTABUSE) 250 MG tablet Take 1 tablet (250 mg) by mouth daily 30 tablet 1 Past Month at unknown    folic acid (FOLVITE) 1 MG tablet Take 1 tablet (1 mg) by mouth daily 30 tablet 1 Past Month at unknown    hydrOXYzine HCl (ATARAX) 25 MG tablet Take 1 tablet (25 mg) by mouth every 4 hours as needed for anxiety 90 tablet 1 Past Month at unknown    levothyroxine (SYNTHROID/LEVOTHROID) 75 MCG tablet Take 75 mcg by mouth daily   6/10/2024 at am    multivitamin w/minerals (THERA-VIT-M) tablet Take 1 tablet by mouth daily 30 tablet 1 Past Month at unknown    nicotine (NICODERM CQ) 21 MG/24HR 24 hr patch Place 1 patch onto the skin daily   More than a month at unknown    thiamine (B-1) 100 MG tablet Take 1 tablet (100 mg) by mouth daily 30 tablet 1 Past Month at unknown    traZODone (DESYREL) 50 MG tablet Take 1 tablet (50 mg) by mouth at bedtime 30 tablet 2 Past Month at unknown    venlafaxine (EFFEXOR XR) 75 MG 24 hr capsule Take 3 capsules (225 mg) by mouth daily (with breakfast) 90 capsule 0 6/10/2024 at am           Physical Exam   Vital Signs: Temp: 97.7  F (36.5  C) Temp src: Oral BP: (!) 145/78 Pulse: 56   Resp: 16 SpO2: 96 % O2 Device: None (Room air)    Weight: 225 lbs 0 oz    GENERAL: Alert and awake. Answering questions appropriately. Oriented x 3. NAD. Pleasant and conversational   HEENT: Anicteric sclera. EOMI. Mucous membranes moist   CARDIOVASCULAR: RRR. S1, S2. No murmurs, rubs, or gallops.   RESPIRATORY: Effort normal on RA  GI: Abdomen soft, non-tender abdomen   MUSCULOSKELETAL: Moves all extremities.   EXTREMITIES: No peripheral edema.  NEUROLOGICAL: No focal deficits. Moving all extremities symmetrically.   SKIN: dry/flaky skin, scabbed areas/excoriation noted on bilateral arms and legs; no open areas noted.     Medical Decision Making       45  MINUTES SPENT BY ME on the date of service doing chart review, history, exam, documentation & further activities per the note.      Data   Imaging results reviewed over the past 24 hrs:   No results found for this or any previous visit (from the past 24 hour(s)).  Recent Labs   Lab 06/10/24  1329   WBC 4.3   HGB 14.7   MCV 96         POTASSIUM 3.6   CHLORIDE 103   CO2 25   BUN 13.9   CR 0.76   ANIONGAP 13   ANTWON 9.6   *   ALBUMIN 4.3   PROTTOTAL 7.0   BILITOTAL <0.2   ALKPHOS 69   ALT 31   AST 30

## 2024-06-11 NOTE — PROGRESS NOTES
[facility]     Case Management Encounter: Writer met with patient to discuss aftercare. Patient stated he returned to drinking almost immediately after [facility] stay in February, 2024. Patient is interested in attending [facility] again.    Insurance: [employer] [facility]     Legal Status: vol     SUDs Assessment Status: Needs update - working on paperwork (Needs update for [facility])     ROIs on file: None     Living Situation: Patient  lives alone    Current Providers, Supports & Collateral:  Sober peers, friends and family    Current Plan/Referral Status: [facility] once OOD - pending assessment (patient working on paperwork for assessment).   ([facility] confirmed patient is able to return to program - he is green on [facility] waitlist)    RN updated.    Ashli Vu  [address] - Adult Inpatient Addiction Psychiatry Unit

## 2024-06-11 NOTE — DISCHARGE INSTRUCTIONS
.Behavioral Discharge Planning and Instructions  THANK YOU FOR CHOOSING [facility]  3AW  [phone]    Summary: You were admitted to Station [facility] on 6/10/2024 for detoxification from Alcohol.  A medical exam was performed that included lab work. You have met with a  and opted to attend residential treatment at [facility].  Please take care and make your recovery a daily priority, Jose Alberto!  It was a pleasure working with you and the entire treatment team here wishes you the very best in your recovery!     Recommendation:  [facility]. Complete program and follow all aftercare recommendations. Abstain from using mood altering substances.     Main Diagnoses:  Per ;Nesha Sims, APRN CNP   Alcohol use disorder, severe, dependence  Alcohol withdrawal  Nicotine use disorder  History of polysubstance abuse  Generalized anxiety disorder  Major depressive disorder versus alcohol-induced depressive disorder  Hypothyroidism  Papulovesicular excoriated rash on the forearms, anterior shins, buttocks   Hypomagnesemia  Hyperlipidemia  Elevated blood pressure         Major Treatments, Procedures and Findings:  You were treated for Alcohol detoxification using Valium protocol. As an outpatient you will be prescribed Antabuse, please take this medication as prescribed until it is gone. You completed a chemical dependency assessment. You had labs drawn and those results were reviewed with you. Please take a copy of your lab work with you to your next primary care provider appointment.    Symptoms to Report:  If you experience more anxiety, confusion, sleeplessness, deep sadness or thoughts of suicide, notify your treatment team or notify your primary care provider. IF ANY OF THE SYMPTOMS YOU ARE EXPERIENCING ARE A MEDICAL EMERGENCY CALL 911 IMMEDIATELY.     Lifestyle Adjustment:  Health Action Plan:  1.Create a daily schedule  2. Eat Healthy  3. Plan Enjoyable Sober Activities  4. Use Problem  Solving Skills and Deal with Issues as they Arise.   5. Be Physically Active  6. Take your medications as prescribed  7. Get enough restful sleep  8. Practice Relaxation  9. Spend time with Supportive People  10. No use of alcohol, illegal drugs or addictive medications other than what is currently prescribed.   11.AA, NA Sponsor are excellent resources for support  12. Explore how  you can utilize spirituality in your recovery    Disposition: [facility] - admit Thursday, 6/13/24 at 1pm    Facts about COVID19 at www.cdc.gov/COVID19 and www.[place].gov/covid19    Keeping hands clean is one of the most important steps we can take to avoid getting sick and spreading germs to others.  Please wash your hands frequently and lather with soap for at least 20 seconds!    Follow-up Appointment:   You are aware you should make a follow up appointment with your primary care doctor for medical and medication management    Primary PCP:  Tj Correa MD      Address:  [facility] in [place], [place]  Address: [address] [place] [address] [address], [place] [address]  Phone: [phone]         Residential CD:   [facility] - admit on Thursday, 6/13/24 at 1pm    Recovery apps for your phone for educational purposes and to locate in person and zoom recovery meetings  Everything AA - educational purpose and migue is a great resource  12 Step Toolkit - educational purpose learning about the 12 steps to recovery  [place] [employer] - meeting migue  AA  - meeting migue  Meeting guide - meeting migue  Quick NA meeting - meeting migue  Jose- has various apps    Resources:  Some AA/NA meetings are being held online however most have returned to in-person or a hybrid combination please check online to verify time  Need Support Now? If you or someone you know is struggling or in crisis, help is available. Call or text 45Smash Bucket or chat Nine Star.org  AA meetings search for them at:  "https://aa-intergroup.org (worldwide meeting listings)  AA meetings for [place] area can be found online at: https://a[place].org (click local online meetings listings)  NA meetings for [place] area can be found online at: https://www.nam[place].org  (click find a meeting)  AA and NA Sponsors are excellent resources for support and you can find one at any support group meeting.   Alcoholics Anonymous (https://aa.org/): for information 24 hours/day  AA Intergroup service office in [place] (http://www.aastpaul.org/) [phone]  AA Intergroup service office in [facility]: [phone]. (http://www.aaRsync.net.org/)  Narcotics Anonymous (www.nam[place].org) (665) 199-7596  https://aafairviewriverside.org/meetings  SMART Recovery - self management for addiction recovery:  www.Best Five Reviewedrecovery.org  Pathways ~ A Health Crisis Resource & Support Center:  907.776.5733.  https://prescribetoprevent.org/patient-education/videos/  http://www.harmreduction.org  Crisis Text Line  Text 757824  You will be connected with a trained live crisis counselor to provide support. Por espanol, texto  FERNANDO a 869163 o texto a 442-AYUDAME en WhatsApp  National Hope Line  1.800.SUICIDE [7914334]  [facility] 995-447-0031  Support Group:  AA/NA and Sponsor/support.  Fast Tracker  Linking people to mental health and substance use disorder resources  Eqlimtrack[place] News Networkn.org   [place] Mental Health Warm Line  Peer to peer support  Monday thru Saturday, 12 pm to 10 pm  799.139.6192 or 0.762.469.4422  Text \"Support\" to 85967  National [place] on Mental Illness (CHRISTY)  680.499.3763 or 1.888.CHRISTY.HELPS  Alcoholics Anonymous (www.alcoholics-anonymous.org): Check your phone book for your local chapter.  Suicide Awareness Voices of Education (SAVE) (www.save.org): 744-542-OOYM (5950)  National Suicide Prevention Line (www.mentalhealthmn.org): 317-776-GFSI (1669)  Mental Health Consumer/Survivor Network of [place] (www.mhcsn.net): " 168-273-9410 or 005-200-5598  Mental Health Association of [place] (www.mentalhealth.org): 812.603.5407 or 732-028-8466   Substance Abuse and Mental Health Services (www.samhsa.gov)  [place] Opioid Prevention Coalition: www.opioidcoalition.org    [place] Recovery Connection (MRC)  [facility] connects people seeking recovery to resources that help foster and sustain long-term recovery.  Whether you are seeking resources for treatment, transportation, housing, job training, education, health care or other pathways to recovery, [facility] is a great place to start.  965.626.5770.  www.minnesotarecCloudBase3y.org    Great Pod casts for nutrition and wellness  Listen on Apple Podcasts  Dishing Up Nutrition   [employer]   Nutrition       Understand the connection between what you eat and how you feel. Hosted by licensed nutritionists and dietitians from [employer] we share practical, real-life solutions for healthier living through nutrition.     General Medication Instructions:   See your medication sheet(s) for instructions.   Take all medications as prescribed.  Make no changes unless your primary care provider suggests them.   Go to all your primary care provider visits.  Be sure to have all your required lab tests. This way, your medicines can be refilled on time.  Do not use any forms of alcohol.    Please Note:  If you have any questions at anytime after you are discharged please call [facility] detox unit 3AW at [phone].  [facility], Behavioral Intake [phone]  Medical Records call [phone]  Outpatient Behavioral Intake call [phone]  LP+ Wait List/Bed Availability call [phone]    Please remember to take all of your behavioral discharge planning and lab paperwork to any follow up appointments, it contains your lab results, diagnosis, medication list and discharge recommendations.      THANK YOU FOR CHOOSING [facility]

## 2024-06-11 NOTE — PLAN OF CARE
Problem: Alcohol Withdrawal  Goal: Alcohol Withdrawal Symptom Control  Outcome: Progressing     Problem: Sleep Disturbance  Goal: Adequate Sleep/Rest  Outcome: Progressing   Goal Outcome Evaluation:          Pt. was monitored overnight for alcohol withdrawal monitoring. He scored 4 & 6.  RN gave no valium Pt. appeared asleep through the night. Breathing was spontaneous and unlabored. No safety or behavioral issue noted or reported The team will continue to monitor.

## 2024-06-11 NOTE — PLAN OF CARE
"  Problem: Alcohol Withdrawal  Goal: Alcohol Withdrawal Symptom Control  Outcome: Progressing   Goal Outcome Evaluation:      Patient is alert and oriented x 4.  Affect is flat , mood is anxious.Patient is up and visible in the milieu. Patient is ambulating independently.Patient is attending/participating in unit programming. Pt is social with peers. Patient denies SI/SIB/HI. Pt denies auditory/visual hallucinations. Patient verbally contracts for safety on the unit.   Endorsed anxiety 9/10, denies depression.   PRN Hydroxyzine 25 mg given for anxiety, (see MAR) at the request of the patient. Patient is tolerating medications well, denies any current side effects.   /121  HR 75  temp 97.8  Resp  16  SATs 96% at RA  Pt's MSSA's = 6 upon first morning withdrawal assessment, No valium given. Patient reports a good appetite, and adequate sleep.  Rest, fluids, and food encouraged. Status 15 checks remain. Patient is able to make needs known appropriately. Patient denies any unmet needs at this time.   [facility] when out of detox.    BP rechecked at 1100 was 157/96 HR 66     1200 Assessments:   BP (!) 179/125 (BP Location: Left arm)   Pulse 82   Temp 98  F (36.7  C) (Oral)   Resp 16   Ht 1.753 m (5' 9\")   Wt 102.1 kg (225 lb)   SpO2 96%   BMI 33.23 kg/m    Pt is  affect flat ,but brightens on approach,  highly anxious moods.Pt is sweaty and tremulous.  Pt's MSSA's 8, Medicated with valium 10 mg   /125, PRN Hydralazine 25 mg given.  Endorsed anxiety and depression 9/10, PRN Hydroxyzine 25 mg given.  Denies SI/HI/SIB contracts for safety while on unit.   Pt is supposed to take a shower to use the cream for his rash.  Staff will continue to monitor and update changes.  "

## 2024-06-11 NOTE — PROGRESS NOTES
[facility]   Behavioral Team Discussion: (6/11/2024)    Continued Stay Criteria/Rationale: Patient admitted for  Alcohol Use Disorder.  Plan: The following services will be provided to the patient; psychiatric assessment, medication management, therapeutic milieu, individual and group support, and skills groups.   Participants: 3A Provider: Dr. Bauer ; 3A RN:  Amarilis Maciel RN; 3A CM's: Ashli Vu.  Summary/Recommendation: Providers will assess today for treatment recommendations, discharge planning, and aftercare plans. CM will meet with pt for discharge planning.   Medical/Physical: Denies  Precautions:   Behavioral Orders   Procedures    Code 1 - Restrict to Unit    Routine Programming     As clinically indicated    Status 15     Every 15 minutes.    Withdrawal precautions     Rationale for change in precautions or plan: No changes  Progress: Improving.

## 2024-06-12 PROCEDURE — 250N000013 HC RX MED GY IP 250 OP 250 PS 637

## 2024-06-12 PROCEDURE — 250N000013 HC RX MED GY IP 250 OP 250 PS 637: Performed by: PHYSICIAN ASSISTANT

## 2024-06-12 PROCEDURE — 250N000013 HC RX MED GY IP 250 OP 250 PS 637: Performed by: PSYCHIATRY & NEUROLOGY

## 2024-06-12 PROCEDURE — 99232 SBSQ HOSP IP/OBS MODERATE 35: CPT | Performed by: NURSE PRACTITIONER

## 2024-06-12 PROCEDURE — 128N000004 HC R&B CD ADULT

## 2024-06-12 PROCEDURE — 250N000013 HC RX MED GY IP 250 OP 250 PS 637: Performed by: NURSE PRACTITIONER

## 2024-06-12 PROCEDURE — 250N000013 HC RX MED GY IP 250 OP 250 PS 637: Performed by: FAMILY MEDICINE

## 2024-06-12 RX ADMIN — CLONIDINE HYDROCHLORIDE 0.1 MG: 0.1 TABLET ORAL at 16:21

## 2024-06-12 RX ADMIN — HYDRALAZINE HYDROCHLORIDE 25 MG: 25 TABLET ORAL at 11:20

## 2024-06-12 RX ADMIN — TRAZODONE HYDROCHLORIDE 50 MG: 50 TABLET ORAL at 20:59

## 2024-06-12 RX ADMIN — FOLIC ACID 1 MG: 1 TABLET ORAL at 08:24

## 2024-06-12 RX ADMIN — HYDROXYZINE HYDROCHLORIDE 25 MG: 25 TABLET, FILM COATED ORAL at 11:20

## 2024-06-12 RX ADMIN — MAGNESIUM OXIDE TAB 400 MG (241.3 MG ELEMENTAL MG) 400 MG: 400 (241.3 MG) TAB at 08:24

## 2024-06-12 RX ADMIN — THERA TABS 1 TABLET: TAB at 08:24

## 2024-06-12 RX ADMIN — LEVOTHYROXINE SODIUM 75 MCG: 75 TABLET ORAL at 08:23

## 2024-06-12 RX ADMIN — NICOTINE POLACRILEX 4 MG: 4 GUM, CHEWING BUCCAL at 21:00

## 2024-06-12 RX ADMIN — NICOTINE POLACRILEX 4 MG: 4 GUM, CHEWING BUCCAL at 10:57

## 2024-06-12 RX ADMIN — NICOTINE POLACRILEX 4 MG: 4 GUM, CHEWING BUCCAL at 16:23

## 2024-06-12 RX ADMIN — VENLAFAXINE HYDROCHLORIDE 225 MG: 150 CAPSULE, EXTENDED RELEASE ORAL at 08:23

## 2024-06-12 RX ADMIN — NICOTINE 1 PATCH: 21 PATCH, EXTENDED RELEASE TRANSDERMAL at 08:24

## 2024-06-12 RX ADMIN — THIAMINE HCL TAB 100 MG 100 MG: 100 TAB at 08:24

## 2024-06-12 RX ADMIN — HYDROXYZINE HYDROCHLORIDE 25 MG: 25 TABLET, FILM COATED ORAL at 20:58

## 2024-06-12 RX ADMIN — HYDROXYZINE HYDROCHLORIDE 25 MG: 25 TABLET, FILM COATED ORAL at 08:24

## 2024-06-12 RX ADMIN — CLONIDINE HYDROCHLORIDE 0.1 MG: 0.1 TABLET ORAL at 08:24

## 2024-06-12 ASSESSMENT — ACTIVITIES OF DAILY LIVING (ADL)
ADLS_ACUITY_SCORE: 40
DRESS: INDEPENDENT
LAUNDRY: WITH SUPERVISION
LAUNDRY: WITH SUPERVISION
ADLS_ACUITY_SCORE: 40
ORAL_HYGIENE: INDEPENDENT
ADLS_ACUITY_SCORE: 40
ADLS_ACUITY_SCORE: 40
HYGIENE/GROOMING: INDEPENDENT
HYGIENE/GROOMING: INDEPENDENT
ADLS_ACUITY_SCORE: 40
DRESS: INDEPENDENT
ORAL_HYGIENE: INDEPENDENT
ADLS_ACUITY_SCORE: 40

## 2024-06-12 NOTE — PLAN OF CARE
"  Problem: Alcohol Withdrawal  Goal: Alcohol Withdrawal Symptom Control  Outcome: Progressing       Patient was up and visible in the milieu able to ambulate the unit independently.He was alert and oriented x 4 able to make all needs known denying any unmet needs at this time.. Patient was able to attend/participate in unit programming.He rated anxiety 3/10 , depression  7/10    Affect affect flat and blunted while  mood is anxious but cooperative.   PRN clonidine was given for elevated BP  .Patient is tolerating medications well, denies any current side effects.   Pt's MSSA  score  was 5 and 4 .  Pt scored <8 on MSSA for >24 hours.  He has not required medication for withdrawal for >24 hours.  Pt has been removed from detox status per unit protocol. Patient plans to discharge tomorrow morning and has started making arrangements for a ride. Patient reports a fair appetite eat 100% of his dinner with good fluid intake.      BP (!) 157/106 (BP Location: Left arm)   Pulse 86   Temp 97.4  F (36.3  C) (Temporal)   Resp 16   Ht 1.753 m (5' 9\")   Wt 102.1 kg (225 lb)   SpO2 98%   BMI 33.23 kg/m     "

## 2024-06-12 NOTE — PROGRESS NOTES
[facility],  Psychiatric Progress Note      Impression:     Jose Alberto Cuellar is a 49-year-old male admitted to [facility] [address] [place] [address] on 6/10/2024.  He was admitted as a voluntary patient through the ED due to alcohol use disorder and withdrawal.  He was most recently on 3A detox in 2/2024.  He was prescribed Antabuse and found it beneficial.  As time went on, he drank alcohol a few times, and then about a month ago his use escalated.  He was consuming 1.75 L of vodka daily.  He states he drinks until he passes out.  He had a DUI 3 weeks ago.  Breathalyzer was 0.11.  UTOX was negative.  He was taking Effexor and Synthroid as prescribed prior to admission.  He was not taking his other medications.  Since admission, the MSSA with Valium was initiated.  Effexor XR and Trazodone were continued.  PRN Hydroxyzine was continued.  Withdrawal symptoms are improving.          Diagnoses:     Alcohol use disorder, severe, dependence  Alcohol withdrawal  Nicotine use disorder  History of polysubstance abuse  Generalized anxiety disorder  Major depressive disorder versus alcohol-induced depressive disorder  Hypothyroidism  Papulovesicular excoriated rash on the forearms, anterior shins, buttocks   Hypomagnesemia  Hyperlipidemia  Elevated blood pressure         Plan:     Medications:  Hold Antabuse until out of detox.  Continue Effexor  mg daily.  Continue PRN Hydroxyzine 25 mg.  Continue Trazodone 50 mg at HS.    Continue MSSA with Valium.     Plan for discharge to [facility].        Attestation:  Patient has been seen and evaluated by me, Nesha Sims, CAROLYN CNP  The patient was counseled on nature of illness and treatment plan/options  Care was coordinated with treatment team  Total time > 35 minutes        Interim History:     The patient's care was discussed with the treatment team and chart notes were reviewed.  Pt received a total of 25 mg of Valium for  alcohol withdrawal yesterday.  He took PRN Hydroxyzine x 2 and PRN Clonidine x 1.  Pt reports his mood is anxious and depressed.  States he has been spending time in bed sleeping due to this.  States these symptoms improve after a period of sobriety.  PRN Hydroxyzine is beneficial for anxiety.  He slept well last night.  Appetite is good.  Reports ongoing pruritus on arms and legs.  No longer has a burning sensation on his abdomen.  BP remains elevated.  States last time he went to [facility], he took a BP med which caused his feet to feel sweaty and would rather avoid BP medications since BP normalizes after a period of sobriety.  Pt continues to state intent to discharge to [facility] when he is out of detox.            Medications:     Current Facility-Administered Medications   Medication Dose Route Frequency Provider Last Rate Last Admin    alum & mag hydroxide-simethicone (MAALOX) suspension 30 mL  30 mL Oral Q4H PRN Amarilis Lindquist APRN CNP        cloNIDine (CATAPRES) tablet 0.1 mg  0.1 mg Oral BID PRN Kathryn Fuentes PA   0.1 mg at 06/12/24 0824    diazepam (VALIUM) tablet 5-20 mg  5-20 mg Oral Q30 Min PRN Stevie Hawley MD   5 mg at 06/11/24 2031    folic acid (FOLVITE) tablet 1 mg  1 mg Oral Daily Amarilis Lindquist APRN CNP   1 mg at 06/12/24 0824    hydrALAZINE (APRESOLINE) tablet 25 mg  25 mg Oral TID PRN Rose Marie Brewer NP   25 mg at 06/11/24 1234    hydrOXYzine HCl (ATARAX) tablet 25 mg  25 mg Oral Q4H PRN Amarilis Lindquist APRN CNP   25 mg at 06/12/24 0824    levothyroxine (SYNTHROID/LEVOTHROID) tablet 75 mcg  75 mcg Oral Daily Nesha Sims APRN CNP   75 mcg at 06/12/24 0823    loperamide (IMODIUM) capsule 2 mg  2 mg Oral 4x Daily PRN Amarilis Lindquist APRN CNP        magnesium oxide (MAG-OX) tablet 400 mg  400 mg Oral Daily Stevie Hawley MD   400 mg at 06/12/24 0824    mineral oil-hydrophilic petrolatum (AQUAPHOR)   Topical Q1H PRN Rose Marie Brewer, NP         "multivitamin, therapeutic (THERA-VIT) tablet 1 tablet  1 tablet Oral Daily Amarilis Lindquist APRN CNP   1 tablet at 06/12/24 0824    nicotine (NICODERM CQ) 21 MG/24HR 24 hr patch 1 patch  1 patch Transdermal Daily Nesha Sims APRN CNP   1 patch at 06/12/24 0824    nicotine polacrilex (NICORETTE) gum 4 mg  4 mg Buccal Q1H PRN Angel Bauer MD   4 mg at 06/11/24 0924    ondansetron (ZOFRAN) tablet 4 mg  4 mg Oral Q6H PRN Amarilis Lindquist APRN CNP        senna-docusate (SENOKOT-S/PERICOLACE) 8.6-50 MG per tablet 1 tablet  1 tablet Oral BID PRN Amarilis Lindquist APRN CNP        thiamine (B-1) tablet 100 mg  100 mg Oral Daily Amarilis Lindquist APRN CNP   100 mg at 06/12/24 0824    traZODone (DESYREL) tablet 50 mg  50 mg Oral At Bedtime Nesha Sims APRN CNP   50 mg at 06/11/24 2104    triamcinolone (ARISTOCORT HP) 0.5 % cream   Topical BID Rose Marie Brewer NP   Given at 06/11/24 2104    venlafaxine (EFFEXOR XR) 24 hr capsule 225 mg  225 mg Oral Daily with breakfast Nesha Sims APRN CNP   225 mg at 06/12/24 0823             Allergies:     Allergies   Allergen Reactions    Penicillins Angioedema and Rash     Converted from Drug Class Allergy: Penicillins            Psychiatric Examination:     BP (!) 130/92 (BP Location: Right arm, Patient Position: Supine, Cuff Size: Adult Large)   Pulse 68   Temp 97.9  F (36.6  C) (Oral)   Resp 16   Ht 1.753 m (5' 9\")   Wt 102.1 kg (225 lb)   SpO2 94%   BMI 33.23 kg/m      Appearance:  awake, alert, fair grooming, and dressed in hospital scrubs  Attitude:  cooperative  Eye Contact:  good  Mood:  anxious, depressed  Affect:  appropriate and in normal range  Speech:  clear, coherent  Psychomotor Behavior:  no evidence of tardive dyskinesia, dystonia, or tics  Thought Process:  linear and goal oriented  Associations:  no loose associations  Thought Content:  no evidence of suicidal ideation or homicidal ideation and no evidence of " psychotic thought  Insight:  fair  Judgment:  fair  Oriented to:  date, time, person, and place  Attention Span and Concentration:  fair, reports some impairment  Recent and Remote Memory:   fair to good  Language:  intact, fluent English  Fund of Knowledge:  appropriate  Muscle Strength and Tone:  normal  Gait and Station:   normal           Labs:     No results found for this or any previous visit (from the past 24 hour(s)).

## 2024-06-12 NOTE — PLAN OF CARE
Problem: Alcohol Withdrawal  Goal: Alcohol Withdrawal Symptom Control  Outcome: Progressing     Problem: Sleep Disturbance  Goal: Adequate Sleep/Rest  Outcome: Progressing   Goal Outcome Evaluation:          This patient is observed through the night for acute alcohol withdrawal symptoms. He scored 4 and 2 on MSSA; RN gave no prn valium.  He appeared comfortable while sleeping. Breathing was quiet and spontaneous. No safety or behavioral issues were reported or noted.The team will continue to monitor.

## 2024-06-12 NOTE — PLAN OF CARE
"  Problem: Alcohol Withdrawal  Goal: Alcohol Withdrawal Symptom Control  Outcome: Progressing   Goal Outcome Evaluation:    Plan of Care Reviewed With: patient    Patient is alert and oriented x 4. Affect is flat, mood is anxious and depressed.Patient is up and visible in the milieu. Patient is ambulating independently.  Patient is attending/participating in unit programming. Pt is social with peers. Patient denies SI/SIB/HI. Pt denies auditory/visual hallucinations. Patient verbally contracts for safety on the unit.    Patient is tolerating medications well, denies any current side effects.     Pt's MSSA's = 4 upon first morning withdrawal assessment, Pt not  medicated. Patient reports a good appetite, and adequate sleep. Rest, fluids, and food encouraged. Status 15 checks remain. Patient is able to make needs known appropriately. Patient denies any unmet needs at this time.   1200 Assessment:  Pt's MSSA's = 3 at noon, Pt did not require Valium per   Endorsed anxiety 5/10 and Depression 8/10, PRN Hydroxyzine 25 mg given   PRN Hydralazine given for the high /76  BP (!) 170/76 (BP Location: Left arm)   Pulse 77   Temp 98  F (36.7  C) (Oral)   Resp 16   Ht 1.753 m (5' 9\")   Wt 102.1 kg (225 lb)   SpO2 97%   BMI 33.23 kg/m        BP rechecked is 148/100 HR 79.  Staff will continue to monitor and update changes.         "

## 2024-06-12 NOTE — CONSULTS
[address] [address]      Assessment and Placement Summary Update     Patient name:   Jose Alberto Cuellar   Patient phone: [phone] (home)    Last #:   [id number]   : 1975      PMI #:  [id number]    Patient address:   [address] [address]     Date of Original Assessment / Last Update: 2024 Update Assessment Date: 2024   Updated by:   ROWAN Collins    E-mail: [email or web address]   Referred by:   [facility] / phone number: [phone]   Referral to:   the [facility] program at [facility] in [place], [place]   NPI: Adam NPI #: [id number]   Summary:  This patient had a Full Comprehensive Substance Use Disorder assessment on 2024 at [facility] in [place], [place] completed by Suad Lozano MA, ROWAN .  INSIDE: The patient's Substance Use Disorder Update assessment completed on 2024 is in the patient's electronic medical record in Epic in the Chart Review section under the Notes/Trans Tab.    Reason for today's update: PER HPI  Jose Alberto Cuellar is a 49 year old male with a history of severe alcohol use disorder with withdrawal, opioid dependence, anxiety, and depression presents to the emergency department seeking detox.     Patient currently drinks approximately 1.75 L of vodka per day.  Patient states he drinks as much as he can until he passes out, wakes up the next day and does it again.  Patient was at [facility] 6 months ago, however states he did not last long and began drinking again.  Patient was on Antabuse, and states it was working well while he was taking it.  Last drink was earlier this morning.  Patient states he gets shakes, sweats, anxiety, and confusion with withdrawal, denies seizure or DT history.  Patient is stable in his antidepressants patient also has a rash on his arms, legs, and beltline that itch and  burn, then come back when he is drinking.  Patient states rash goes away when he stops drinking.  Occurred multiple times.  Has been treated successfully with triamcinolone cream in the past.  Denies any contagious exposures.  He denies any rash on his hands.       Substance Use History Update:           X = Primary Drug Used   Age of First Use Most Recent Pattern of Use and Duration   Need enough information to show pattern (both frequency and amounts) and to show tolerance for each chemical that has a diagnosis   Date of last use and time, if needed   Withdrawal Potential? Requiring special care Method of use  (oral, smoked, snort, IV, etc)      Alcohol     15 Daily, drinking 1.75  06/10 06/10/24 Oral      Marijuana/  Hashish   17 No heavy use reported. 10 years ago  Smoke      Cocaine/Crack     19 Cocaine:  HU - 27-30 years old.  12-13 years ago No Smoke/snort      Meth/  Amphetamines   18 HU: [age] 12-13 years ago No Smoke      Heroin     [age] 1-2 times lifetime.  15 years ago No Smoke      Other Opiates/  Synthetics   21 Fentanyl: 1 time.     Opiates: Several different ones First use:21  Last use: age [age] 12 years ago No Oral      Inhalants     No use          Benzodiazepines     No use  15 years ago        Hallucinogens     College Mushrooms and LSD: used a few times. 15 years ago No Oral      Barbiturates/  Sedatives/  Hypnotics No use          Over-the-Counter Drugs   No use          Other     No use          Nicotine     24 1 PPD   06/10/24 Yes Smoke     Dimension: Severity Rating/ Reason for Changes from Previous Assessment:  Dimension I: Acute intoxication/Withdrawal potential     Previous ratin Current ratin   Comments:   No change     Dimension II: Biomedical Conditions and Complications     Previous ratin Current ratin   Comments:   Pt reported he has not been to a medical doctor in years. He does report recently establishing a PCP through [facility] however missed his  appointment for a physical due to being here in detox.      Dimension III: Emotional/Behavioral/Cognitive     Previous ratin Current ratin   Comments:   Pt reports having no mental health resources established in the community and receives his medication from a medical doctor. Pt reported he has been medication compliant.      Dimension IV: Readiness for Change     Previous ratin Current ratin   Comments:   No change.     Dimension V: Relapse/Continued Use/Continued problem potential     Previous ratin Current ratin   Comments:   No change.     Dimension VI: Recovery environment    Previous ratin Current ratin   Comments:   Pt reported he is living in his own apartment and has recently became employed. Pt has not been involved in any structured, sober activities.        Summary of Assessment Update and Recommendations:   What was the outcome of last referral?  Pt reports after attend [facility] in the beginning of this year he maintained sobriety for about a month. He shared he relapsed and came back to detox at [facility] and was prescribed Antabuse and was able to maintain sobriety for 2 months. Pt reported he has been unable to maintain sobriety due to not doing what he was told to do and doing everything he was told not to do such as returning to his apartment, getting into a relationship and did not attend AA meetings on a regular basis.      Reason for changes in the Risk Description since last assessment? NA     Recommendation and rationale for current request and significant issues that need to be addressed:    Recommendations:  1)  Complete a Residential  CD Treatment Program  2)  Abstain from all mood-altering chemicals unless prescribed by a licensed provider.   3)  Attend, at minimum, 2 weekly support group meetings, such as 12 step based (AA/NA), SMART Recovery, Health Realizations, and/or Refuge Recovery meetings.     4)  Actively work with a male  mentor/sponsor on a weekly basis.   5)  Follow all the recommendations of your treatment/medical providers.      Referrals/ Alternatives: Residential Program    [facility]  [address], [place] [address]       JACKI consult completed by:   Kimmie Hayes [facility]  Substance Use Disorder Evaluation Counselor  E-mail Address: [email or web address] Consult & Liaison Department  [employer], [address] [place] [address]     *Due to regulation of Title 42 of the Code of Federal Regulations (CFR) Part 2: Confidentiality laws apply to this note and the information wherein.  Thus, this note cannot be copy and pasted into any other health care staff's note nor can it be included in general medical records sent to ANY outside agency without the patient's written consent.

## 2024-06-12 NOTE — PROGRESS NOTES
"Triage & Transition Services, Extended Care       Patient: Jose Alberto goes by \"Jose Alberto,\" uses he/him pronouns  Date of Service: June 12, 2024  Site of Service: [facility] MENT[facility] HE[facility] & ADDI[facility]                             F318-02  Patient was seen    In person   Mode of Assessment:      Patient is followed related to: other  Notable observations from today's encounter include: NA  The care team is working towards the following:    Significant status changes: no  Case Management included: Case Management Included: collaborating with patient's support system  Details on Collaborating with Patient's Support System: Pt's care team have been notified that JACKI consult has been completed.  Summary of Interaction: Writer met with pt to complete JACKI consult. Pt reported he wanted to go to treatment and was willing to complete an updated JACKI assessment with writer. Pt reported he attended [facility] in January/February of this year and maintained sobriety for 2 months. He reported he relapsed and came back to detox at which time he was prescribed Antabuse and maintained sobriety for an additional 2 months. Pt reported relapsing and has been drinking daily. Pt requested he be referred to [facility].    Recommendations:  Residential JACKI Program     [facility]  [address] [place] [address]    Summary:  Pt reported he wanted to go to treatment and was willing to complete an updated JACKI assessment with writer. Pt reported he attended [facility] in January/February of this year and maintained sobriety for 2 months. He reported he relapsed and came back to detox at which time he was prescribed Antabuse and maintained sobriety for an additional 2 months. Pt reported relapsing and has been drinking daily. Pt requested he be referred to [facility].    Legal Status:    Legal Status at Admission: Voluntary/Patient has signed consent for treatment    ROWAN [facility]  [phone]   "

## 2024-06-12 NOTE — PLAN OF CARE
Goal Outcome Evaluation:    Plan of Care Reviewed With: patient      Pt is in detox for alcohol on MSSA with prn valium.  He reports feeling sweaty and anxious at 9/10.  BP remains elevated. Prn clonidine given x1.     Took a shower with prompting. Treatment cream applied to generalized body rash.   Rash more on the back than the rest of upper body. Pt reports itching from time to time.     Pt had a good appetite. Denied feeling depressed or suicidal.     MSSA 9 and 8 valium 15mg total received this shift.   B/P: 164/109, T: 97.2, P: 81, R: 16   BP:160/113, P87.     Not applicable

## 2024-06-12 NOTE — PROGRESS NOTES
Patient is accepted to [facility] and can admit on 6/13/24 at 1pm if patient is out of detox    AVS updated

## 2024-06-13 ENCOUNTER — HOSPITAL ENCOUNTER (OUTPATIENT)
Dept: BEHAVIORAL HEALTH | Facility: CLINIC | Age: 49
Discharge: HOME | End: 2024-06-13
Attending: FAMILY MEDICINE
Payer: COMMERCIAL

## 2024-06-13 VITALS
SYSTOLIC BLOOD PRESSURE: 155 MMHG | HEIGHT: 69 IN | BODY MASS INDEX: 33.33 KG/M2 | DIASTOLIC BLOOD PRESSURE: 90 MMHG | HEART RATE: 95 BPM | OXYGEN SATURATION: 97 % | RESPIRATION RATE: 18 BRPM | WEIGHT: 225 LBS | TEMPERATURE: 97 F

## 2024-06-13 PROBLEM — F19.20 CHEMICAL DEPENDENCY (H): Status: ACTIVE | Noted: 2024-06-13

## 2024-06-13 LAB — CREAT UR-MCNC: 65 MG/DL

## 2024-06-13 PROCEDURE — 1002N00001 HC LODGING PLUS FACILITY CHARGE ADULT

## 2024-06-13 PROCEDURE — 80366 DRUG SCREENING PREGABALIN: CPT | Performed by: FAMILY MEDICINE

## 2024-06-13 PROCEDURE — 250N000013 HC RX MED GY IP 250 OP 250 PS 637: Performed by: PSYCHIATRY & NEUROLOGY

## 2024-06-13 PROCEDURE — 80360 METHYLPHENIDATE: CPT | Performed by: FAMILY MEDICINE

## 2024-06-13 PROCEDURE — 250N000013 HC RX MED GY IP 250 OP 250 PS 637: Performed by: FAMILY MEDICINE

## 2024-06-13 PROCEDURE — 250N000013 HC RX MED GY IP 250 OP 250 PS 637: Performed by: NURSE PRACTITIONER

## 2024-06-13 PROCEDURE — 99239 HOSP IP/OBS DSCHRG MGMT >30: CPT | Performed by: NURSE PRACTITIONER

## 2024-06-13 PROCEDURE — H2035 A/D TX PROGRAM, PER HOUR: HCPCS

## 2024-06-13 RX ORDER — ACETAMINOPHEN 500 MG
500-1000 TABLET ORAL EVERY 8 HOURS PRN
COMMUNITY
End: 2024-06-20

## 2024-06-13 RX ORDER — NICOTINE 21 MG/24HR
1 PATCH, TRANSDERMAL 24 HOURS TRANSDERMAL DAILY
Qty: 28 PATCH | Refills: 1 | Status: SHIPPED | OUTPATIENT
Start: 2024-06-13 | End: 2024-06-20

## 2024-06-13 RX ORDER — LEVOTHYROXINE SODIUM 75 UG/1
75 TABLET ORAL DAILY
Qty: 30 TABLET | Refills: 1 | Status: SHIPPED | OUTPATIENT
Start: 2024-06-13 | End: 2024-08-13

## 2024-06-13 RX ORDER — IBUPROFEN 200 MG
600 TABLET ORAL EVERY 6 HOURS PRN
COMMUNITY
End: 2024-06-20

## 2024-06-13 RX ORDER — HYDROXYZINE HYDROCHLORIDE 25 MG/1
25 TABLET, FILM COATED ORAL EVERY 4 HOURS PRN
Qty: 90 TABLET | Refills: 1 | Status: SHIPPED | OUTPATIENT
Start: 2024-06-13 | End: 2024-09-20

## 2024-06-13 RX ORDER — DISULFIRAM 250 MG/1
250 TABLET ORAL DAILY
Qty: 30 TABLET | Refills: 1 | Status: SHIPPED | OUTPATIENT
Start: 2024-06-13 | End: 2024-07-02

## 2024-06-13 RX ORDER — MAGNESIUM OXIDE 400 MG/1
400 TABLET ORAL DAILY
Qty: 30 TABLET | Refills: 1 | Status: SHIPPED | OUTPATIENT
Start: 2024-06-13

## 2024-06-13 RX ORDER — TRIAMCINOLONE ACETONIDE 5 MG/G
CREAM TOPICAL 2 TIMES DAILY
Qty: 454 G | Refills: 0 | Status: SHIPPED | OUTPATIENT
Start: 2024-06-13

## 2024-06-13 RX ORDER — HYDRALAZINE HYDROCHLORIDE 25 MG/1
25 TABLET, FILM COATED ORAL 3 TIMES DAILY PRN
Qty: 30 TABLET | Refills: 1 | Status: SHIPPED | OUTPATIENT
Start: 2024-06-13 | End: 2024-07-02

## 2024-06-13 RX ORDER — MINERAL OIL/HYDROPHIL PETROLAT
OINTMENT (GRAM) TOPICAL
Qty: 400 G | Refills: 1 | Status: SHIPPED | OUTPATIENT
Start: 2024-06-13 | End: 2024-06-20

## 2024-06-13 RX ORDER — MULTIPLE VITAMINS W/ MINERALS TAB 9MG-400MCG
1 TAB ORAL DAILY
Qty: 30 TABLET | Refills: 1 | Status: SHIPPED | OUTPATIENT
Start: 2024-06-13

## 2024-06-13 RX ORDER — FOLIC ACID 1 MG/1
1 TABLET ORAL DAILY
Qty: 30 TABLET | Refills: 1 | Status: SHIPPED | OUTPATIENT
Start: 2024-06-13

## 2024-06-13 RX ORDER — TRAZODONE HYDROCHLORIDE 50 MG/1
50 TABLET, FILM COATED ORAL AT BEDTIME
Qty: 30 TABLET | Refills: 1 | Status: SHIPPED | OUTPATIENT
Start: 2024-06-13 | End: 2024-08-13

## 2024-06-13 RX ORDER — AMOXICILLIN 250 MG
2 CAPSULE ORAL DAILY PRN
COMMUNITY
End: 2024-06-20

## 2024-06-13 RX ORDER — LORATADINE 10 MG/1
10 TABLET ORAL DAILY PRN
COMMUNITY
End: 2024-06-20

## 2024-06-13 RX ORDER — LANOLIN ALCOHOL/MO/W.PET/CERES
100 CREAM (GRAM) TOPICAL DAILY
Qty: 30 TABLET | Refills: 1 | Status: SHIPPED | OUTPATIENT
Start: 2024-06-13

## 2024-06-13 RX ORDER — GUAIFENESIN/DEXTROMETHORPHAN 100-10MG/5
10 SYRUP ORAL EVERY 4 HOURS PRN
COMMUNITY
End: 2024-06-20

## 2024-06-13 RX ORDER — VENLAFAXINE HYDROCHLORIDE 75 MG/1
225 CAPSULE, EXTENDED RELEASE ORAL
Qty: 90 CAPSULE | Refills: 1 | Status: SHIPPED | OUTPATIENT
Start: 2024-06-13 | End: 2024-08-13

## 2024-06-13 RX ORDER — MAGNESIUM HYDROXIDE/ALUMINUM HYDROXICE/SIMETHICONE 120; 1200; 1200 MG/30ML; MG/30ML; MG/30ML
30 SUSPENSION ORAL EVERY 6 HOURS PRN
COMMUNITY
End: 2024-06-20

## 2024-06-13 RX ADMIN — TRIAMCINOLONE ACETONIDE: 5 CREAM TOPICAL at 09:02

## 2024-06-13 RX ADMIN — NICOTINE POLACRILEX 4 MG: 4 GUM, CHEWING BUCCAL at 10:11

## 2024-06-13 RX ADMIN — THERA TABS 1 TABLET: TAB at 09:02

## 2024-06-13 RX ADMIN — LEVOTHYROXINE SODIUM 75 MCG: 75 TABLET ORAL at 07:08

## 2024-06-13 RX ADMIN — VENLAFAXINE HYDROCHLORIDE 225 MG: 150 CAPSULE, EXTENDED RELEASE ORAL at 09:01

## 2024-06-13 RX ADMIN — THIAMINE HCL TAB 100 MG 100 MG: 100 TAB at 09:01

## 2024-06-13 RX ADMIN — FOLIC ACID 1 MG: 1 TABLET ORAL at 09:02

## 2024-06-13 RX ADMIN — MAGNESIUM OXIDE TAB 400 MG (241.3 MG ELEMENTAL MG) 400 MG: 400 (241.3 MG) TAB at 09:01

## 2024-06-13 RX ADMIN — NICOTINE 1 PATCH: 21 PATCH, EXTENDED RELEASE TRANSDERMAL at 09:03

## 2024-06-13 RX ADMIN — HYDROXYZINE HYDROCHLORIDE 25 MG: 25 TABLET, FILM COATED ORAL at 09:07

## 2024-06-13 ASSESSMENT — ACTIVITIES OF DAILY LIVING (ADL)
ADLS_ACUITY_SCORE: 40
DRESS: INDEPENDENT
ADLS_ACUITY_SCORE: 40
LAUNDRY: UNABLE TO COMPLETE
ADLS_ACUITY_SCORE: 40
ORAL_HYGIENE: INDEPENDENT
ADLS_ACUITY_SCORE: 40
HYGIENE/GROOMING: INDEPENDENT
ADLS_ACUITY_SCORE: 40

## 2024-06-13 ASSESSMENT — ANXIETY QUESTIONNAIRES
5. BEING SO RESTLESS THAT IT IS HARD TO SIT STILL: SEVERAL DAYS
GAD7 TOTAL SCORE: 16
3. WORRYING TOO MUCH ABOUT DIFFERENT THINGS: NEARLY EVERY DAY
6. BECOMING EASILY ANNOYED OR IRRITABLE: SEVERAL DAYS
GAD7 TOTAL SCORE: 16
7. FEELING AFRAID AS IF SOMETHING AWFUL MIGHT HAPPEN: NEARLY EVERY DAY
2. NOT BEING ABLE TO STOP OR CONTROL WORRYING: NEARLY EVERY DAY
1. FEELING NERVOUS, ANXIOUS, OR ON EDGE: NEARLY EVERY DAY
4. TROUBLE RELAXING: MORE THAN HALF THE DAYS

## 2024-06-13 ASSESSMENT — PATIENT HEALTH QUESTIONNAIRE - PHQ9: SUM OF ALL RESPONSES TO PHQ QUESTIONS 1-9: 17

## 2024-06-13 NOTE — PROGRESS NOTES
[facility] Nursing Health Assessment        Vital signs:      There were no vitals taken for this visit.        Direct admission     Counselor: Group B  Drug of Choice: ETOH  Last use: 6/11  Home clinic/MD: [facility]  [address] [place] [address], [address] [facility] [address] [phone]    Appt date and time: 3/13/24 at 9AM  Provider: Yariel Cuellar NP     Psychiatrist/therapist: none  Addiction med elizabeth zavaleta     Medical history/current conditions:  Alcohol use disorder, severe, dependence  Nicotine use disorder  History of polysubstance abuse  Generalized anxiety disorder  Major depressive disorder versus alcohol-induced depressive disorder  Hypothyroidism  Papulovesicular excoriated rash on the forearms, anterior shins, buttocks   Hypomagnesemia  Hyperlipidemia  Elevated blood pressure     H&P Screen:  H&P within the last 90 days: Yes.  Date: 6/13/24 Location:         Mental Health diagnosis: depression/anxiety   Medication compliant?: yes  Recent sucidal thoughts?   Has passive thoughts while intoxicated, no thoughts,plan or intent currently                      Current thought of self-harm? No                                           Plan? Na                                                              Pain assessment:   Pt. Experiencing pain at this time?  no        LP Falls assessment     Has patient had a fall(s) in the last 6 months?  no  If yes, what were the circumstances surrounding the fall(s)? na  Does patient have gait dysfunctions? (limping, dragging of toes, shuffling feet, unsteadiness, difficulty standing /walking)  No  Does patient appear to have deconditioning/muscle loss/malnutrition/fatigue? (due to inactivity, bedrest (long hospitalization), medical condition(s) No  Does patient experience confusion, dizziness or vertigo? No  Is patient visually impaired? No   Does patient have any adaptive equipment (hearing aids, wheelchair, walker, prosthesis, crutches, cane,  etc..) No  Is patient taking 2 or more of the following medications?  anticonvulsants, anti-hypertensives, diuretics, laxatives, sedatives, and psychotropics (single-select) No  Is patient taking medication (s) that would cause urinary or bowel urgency (ex: lactulose/Furosemide)? No  Does patient have a medical condition (ex: Diabetes, Liver Disease, Respiratory Diseases, Chronic Pain, Heart Disease, Neuropathy, Seizure like Disorder, etc...) that could affect balance/gait or risk for falls? No     If yes to any of the above educate patient on fall prevention while at [facility].           Floors clutter/obstacle free           Proper footwear/[place] shoes          Adjust walking speed accordingly          Recommend caution going on longer walks. Try shorter walks and see how you do first.  Use elevators when appropriate.          Notify staff if you are experiencing fatigue, weakness, drowsiness/ dizziness or have had a fall.           Use adaptive equipment as recommended          Wheelchairs must be managed and propelled independently without staff assistance           Expectation of complete independence with all cares and compliance.  Report to staff if having difficulty                LP patient who poses a potential falls risk will be advised of the following:  Please follow the recommendations as listed above or it may affect your treatment plan.  Your treatment team would have to evaluate if this level of care is appropriate for you.    Patient acknowledges and verbalizes understanding of the above criteria? No  Support staff / counselors notified of pt Fall Screen and plan of prevention. LPRN to re-assess as appropriate. White board and SBAR updated No  ____________________________________________________________________________________________________________________________     LP Community Medical Screen for COVID-19     Do you have any of the following NEW or worsening symptoms NOT attributed to  pre-existing conditions?    No     Fever of 100.0  F (37.8 C) or over  Chills  Cough  Shortness of Breath  Loss of taste or smell  Generalized body aches  Persistent headache  Sore throat (or trouble eating or drinking in young children?)  Nausea, Vomiting, or diarrhea (loose stools)     Did you test positive for COVID-19 in the last 10 days or are you waiting on the test results due to an exposure or symptoms?  No     Has anyone told you to self-quarantine due to exposure to someone with COVID-19?  No     If pt responds   YES to any of the symptoms or  Positive COVID-19 result in the last 10 days with or without symptoms or YES to symptoms with pending results ptwill need to leave unit immediately and can return in 11 days from discharge date.    ______________________________________________________________________________________________________________________     COVID-19 Test completed by LPRN ? No     COVID-19 - Pt informed of the following while at LP:     1) If pt has any of the symptoms below, notify staff immediately.     Fever   Cough   Shortness of breath or difficulty breathing   Chills   Repeated shaking with chills  Muscle pain      RN Assessment of Infectious Disease concerns:     Infectious disease concerns None  RN Assessment of Patient's Ability to Safely Manage and Self-Administer Respiratory Treatments     Has experience in the management of Respiratory (If NA, indicate and move to Integrative Therapies):  NA  Patient verbalized and demonstrated understanding of how to use essential oil inhaler correctly and will notify LP RN with any concerns or side effects. Patient agrees not to share their essential oil inhaler with other clients.  Continue to support the patient in safely utilizing integrative therapies as able to manage symptoms during treatment.      Patient tobacco use: vaped and smoked cigarettes      Are you interested in quitting? yes    NRT (Nicotine Replacement therapy) ordered?  yes   Pt is aware of the dangers of tobacco cessation and in contemplation.    Pt given written education.     Nutritional Assessment:     Have you ever purged, binged or restricted yourself as a way to control your weight?    No      Are you on a special diet?    No      Do you have any concerns regarding your nutritional status?    No      Have you had any appetite changes in the last 3 months?    No   Have you had weight loss or weight gain of more than 10 lbs in the last 3 months?   If patient gained or lost more than 10 lbs, then refer to program RN / attending Physician for assessment.    Yes, explain: no   Was the patient informed of BMI?     Above,  General nutrition education    Yes   Have you engaged in any risk-taking behavior that would put you at risk for exposure to blood-borne or sexually transmitted diseases?    no   Do you have any dental problems?    no         Review with pt's for opioid use disorder: (Please delete below if not applicable)     Pt reporting last use of opioid on date 10 years ago, but one situation on 1/1/24 overdosed on fentanyl accidentally    Pt understands LP drug toxicology screening process yes     LPRN reviewed with pt the following information:  Yes  Pt informed if leaving AMA, they will be directed to take medications with them.  Should pt's choose to leave medications at LP, ALL medications will be packaged and delivered to inpatient pharmacy for temporary storage.  Inpt pharmacy will follow protocol to reach out to pt.  If pharmacy unable to reach pt and/or pt does not retrieve medications, they will be destroyed per inpt pharmacy protocol.   In regards to 'medical concerns/medication refill expectations' while at LP:  Pt understands LP has no rounding/managing provider to assess medical issues or to refill medications.  Pt's instructed to make virtual/phone appt/s with community provider/s and notify LPRN of date and time  Pt's understand they may not leave LP to  attend any medical appt's.   Pt's understand they are responsible for having a plan to refill medications and to allow time to troubleshoot.       Pt verbalizes understanding of the above criteria yes     [facility]  Nurse Liaison / CD [facility]  O: [phone]  Fax:[phone]  [facility] [id number]  M-F: 7AM to 5:30PM   Sat-Sun: 7AM to 3PM  After hours: [phone]   Nursing Assessment Summary:  As above      On-going nursing intervention required?   No     Acute care visit recommended: no

## 2024-06-13 NOTE — PROGRESS NOTES
Progress Note    This patient had a Assessment and Placement Summary Update on 6/12/2024 completed by ROWAN Collins.  This patient was seen for a face to face update of the Assessment and Placement Summary Update on 6/13/2024 by ROWAN Wallace.  INSIDE: The patient's Assessment and Placement Summary Update completed on 6/12/2024 is in the patient's electronic medical record in Epic in the Chart Review section under the Notes/Trans Tab.    Alcohol/Drug use since the last CD evaluation (include date of last use):     No additional substances use since the last CD evaluation     Please note any other clinical changes since the last CD evaluation (such as medication changes, additional legal charges, detoxification admissions, overdoses, etc.)     No significant changes since the last CD evaluation       ASAM Dimensions Original scores Current Scores   I.) Intoxication and Withdrawal: 0 0   II.) Biomedical:  0 0   III.) Emotional and Behavioral:  2 2   IV.) Readiness to Change:  2 2   V.) Relapse Potential: 4 4   VI.) Recovery Environmental: 4 4     Please list clinical justifications for the above ASAM score changes since the original comprehensive assessment:     None of the ASAM scores on the six dimensions had changed since the Assessment and Placement Summary Update was completed on 6/12/2024.       Current ROSE MARY: Current UA:     0.000     Pt was unable to produce at time of admission       PHQ-9, CINTHIA-7   PHQ-9 on 6/13/2024 CINTHIA-7 on 6/13/2024   The patient's PHQ-9 score was 17 out of 27, indicating moderately severe depression.   The patient's CINTHIA-7 score was 16 out of 21, indicating severe anxiety.       [place]-Suicide Severity Rating Scale Reassessment   Have you ever wished you were dead or that you could go to sleep and not wake up?  Past Month:  Yes     Have you actually had any thoughts of killing yourself?  Past Month:  No     Have you been thinking about how you might do this?     Past Month:  No    "Lifetime:  Yes, Describe: Pt reports \"15 years ago\" he thought about hanging himself   Have you had these thoughts and had some intention of acting on them?     Past Month:  No   Lifetime:  No   Have you started to work out the details of how to kill yourself?   Past Month:  No   Lifetime:  No   Do you intend to carry out this plan?   No     When you have the thoughts how long do they last?  More than 8 hours/persistent or continuous     Are there things - anyone or anything (i.e. family, [employer]ec[employer]ism, pain of death) that stopped you from wanting to die or acting on thoughts of suicide?  Uncertain that protective factors stopped you       2008  [employer]  Used with permission by Sinai Larson, PhD.       Guide to C-SSRS Risk Ratings   NO IDEATION:  with no active thoughts IDEATION: with a wish to die. IDEATION: with active thoughts. Risk Ratings   If Yes No No 0 - Very Low Risk   If NA Yes No 1 - Low Risk   If NA Yes Yes 2 - Low/moderate risk   IDEATION: associated thoughts of methods without intent or plan INTENT: Intent to follow through on suicide PLAN: Plan to follow through on suicide Risk Ratings cont...   If Yes No No 3 - Moderate Risk   If Yes Yes No 4 - High Risk   If Yes Yes Yes 5 - High Risk   The patient's ADDITIONAL RISK FACTORS and lack of PROTECTIVE FACTORS may increase their overall suicide risk ratings.     Additional Risk Factors:   Significant history of having untreated or poorly treated mental health symptoms    A recent loss that was significant to the patient, i.e. loss of job, loss of home, divorce, break-up, etc.    No additional risk factors   Protective Factors:   Having people in his/her life that would prevent the patient from considering a suicide attempt (i.e. young children, spouse, parents, etc.)    An absence of chronic health problems or stable and well treated chronic health issues    Having easy access to supportive family members    Having a " "good community support network    No significant protective factors     Risk Status   1. - Low Risk: Evaluation Counselors:  Document in Epic / SBAR to counselor \"Low Risk\".      Treatment Counselors:  Reassess upon admission as applicable, assess weekly in progress notes under Dimension 3 and summarize in Discharge / Treatment summary under Dimension 3.     Additional information to support suicide risk rating: There was no additional information to provide at this time.      "

## 2024-06-13 NOTE — PLAN OF CARE
Problem: Sleep Disturbance  Goal: Adequate Sleep/Rest  Outcome: Progressing   Goal Outcome Evaluation:    Since completing the detox process, the Patient has been sleeping well at night, and the ongoing 15-minute safety checks continue without any issues.

## 2024-06-13 NOTE — PROGRESS NOTES
Initial Services Plan    Before your first treatment group, please do the following    Immediate health & safety concerns: Look for sober housing and a supportive social network.  Look for a support network (such as AA, NA, DBT group, a Bahai group, etc.)    Suggestions for client during the time between intake & completion of treatment plan:  Tour your treatment center (unit or outpatient clinic).  Introduce yourself to the treatment group.  Spend time getting to know your peers.  Complete your psycho-social paperwork.  Complete the problem list for your treatment plan.  Start drug and alcohol use history.  Review your patient or client handbook.    Client issues to be addressed in the first treatment sessions:  Other Pt reports that he would like to start therapy    ROWAN Wallace  6/13/2024

## 2024-06-13 NOTE — PROGRESS NOTES
Name: Jose Alberto Cuellar  Date: 6/13/2024  Medical Record: [id number]    Envelope Number: [id number]    List of Contents (List each item separately in new row):   Cell Phone (cracked)    Admission:  I am responsible for any personal items that are not sent to the safe or pharmacy.  [facility] is not responsible for loss, theft or damage of any property in my possession.      Patient Signature:  ___________________________________________       Date/Time:__________________________    Staff Signature: __________________________________       Date/Time:__________________________    Discharge:  [facility] has returned all of my personal belongings:    Patient Signature: ________________________________________     Date/Time: ____________________________________    Staff Signature: ______________________________________     Date/Time:_____________________________________

## 2024-06-13 NOTE — PROGRESS NOTES
"Comprehensive Assessment Summary      Based on client interview, review of previous assessments and   comprehensive assessment interview the following diagnosis and recommendations are:      Patient: Jose Alberto Cuellar  MRN: [id number]   : 1975  Age: 49 years old Sex: Male         Client meets criteria for:  Alcohol Use Disorder Severe - 303.90 (F10.20)     Dimension One: Acute Intoxication/Withdrawal Potential     Ratin   (Consider the client's ability to cope with withdrawal symptoms and current state of intoxication)    Patient reports his date of last use as 6/10/24. He was medically detoxified on [facility] prior to admission to [facility]. He denies any withdrawal symptoms at this time.     Dimension Two: Biomedical Condition and Complications    Ratin  (Consider the degree to which any physical disorder would interfere with treatment for substance abuse, and the client's ability to tolerate any related discomfort; determine the impact of continued chemical use on the unborn child if the client is pregnant)    Patient reports biomedical condition of hypothyroidism. He denies any condition that will interfere with his ability to participate in treatment programming. Patient will work to schedule an appointment with a PCP while at [facility]. He will continue to be monitored throughout treatment.     Dimension Three: Emotional/Behavioral/Cognitive Conditions & Complications   Ratin  (Determine the degree to which any condition or complications are likely to interfere with treatment for substance abuse or with functioning in significant life areas and the likelihood of risk of harm to self or others)    Patient reports mental health diagnoses of anxiety and depression. Upon admission, client's PHQ-9 score was 17/27, indicating moderately severe depression.  Upon admission, client's CINTHIA-7 score was 16/21, indicating severe anxiety.  Patient's suicide risk assessment rated them as \"Low Risk\".  " "Patient will continue to be monitored throughout treatment.     Dimension Four: Treatment Acceptance/Resistance     Ratin  (Consider the amount of support and encouragement necessary to keep the client involved in treatment)     Patient has continued to use substances despite consequences to himself and others. He reports he has now \"lost everything\" and is willing to \"do it right\" this time. He has 2 prior treatment episodes, including [facility] in early . He appears to be in the contemplation stage of change at this time.     Dimension Five: Continued Use/Relapse Prevention     Ratin  (Consider the degree to which the client's recognizes relapse issues and has the skills to prevent relapse of either substance use or mental health problems)     Patient has limited insight into his personal relapse process and lacks long-term sober coping skills. He reports he relapsed immediately after completing [facility] in 2024. He reports he \"didn't listen\", choosing to return to his apartment, enter a relationship, and not attend AA meetings. His longest period of sobriety was 3 years, during which time he \"pushed myself to exhaustion\" with a laborious job. He remains a high risk for relapse at this time.     Dimension Six: Recovery Environment    Ratin  (Consider the degree to which key areas of the client's life are supportive of or antagonistic to treatment participation and recovery)     Patient is single and has no children. He reports returning to his apartment after graduating [facility], and that this environment is not stable or supportive of recovery. He reports his family is supportive of him. Patient has had 2 DUIs in his early 20's and had a DUI 3 weeks ago.      I have reviewed the information on the assessment, psychosocial and medical history and checklist:        it is current  "

## 2024-06-13 NOTE — PLAN OF CARE
Problem: Adult Behavioral Health Plan of Care  Goal: Plan of Care Review  Recent Flowsheet Documentation  Taken 6/13/2024 1036 by Lucita John RN  Patient Agreement with Plan of Care: agrees   Goal Outcome Evaluation:    Plan of Care Reviewed With: patient        Pt out of detox and in lounge with peers, endorsing a good appetite, denies pain, SI, plan thought or intent, Triamcinolone cream applied to upper back for excoriated rash, that itches and burns per pt report, pt utilizing Aquaphor for dry skin and irritation, BP prior to breakfast 156/97, rechecked after meals 155/90 P 95. Pt has elevated Bp's while withdrawing, Mg+ 1.6 taking Mg oxide . Pt will be discharging to [facility] at 1 pm, report given to Kylee Bowden, will review meds and instructions .    Meds reviewed, pt education given on pt scheduling a PCP appointment for follow up care, to manage his elevated Lipid panel.      Belongings returned, pt escorted to [facility] with meds by Jose Alberto WEIR.

## 2024-06-13 NOTE — DISCHARGE SUMMARY
"Psychiatric Discharge Summary    Jose Alberto Cuellar MRN# [id number]   Age: 49 year old YOB: 1975     Date of Admission:  6/10/2024  Date of Discharge:  6/13/2024  Admitting Physician:  Angel Bauer MD  Discharge Physician:  CAROLYN Dick CNP (Contact: [phone])         Event Leading to Hospitalization:      From H&P 6/11/2024:    Jose Alberto Cuellar is a 49-year-old male admitted to [address] on 6/10/2024.  He was admitted as a voluntary patient through the ED due to alcohol use disorder and withdrawal.  He was most recently on  detox in 2/2024.  He was prescribed Antabuse and found it beneficial.  As time went on, he drank alcohol a few times, and then about a month ago his use escalated.  He was consuming 1.75 L of vodka daily.  He states he drinks until he passes out.  He had a DUI 3 weeks ago.  Breathalyzer was 0.11.  UTOX was negative.  He was taking Effexor and Synthroid as prescribed prior to admission.  He was not taking his other medications.      His mood has been depressed.  He reports that he has had some passive suicidal thoughts when he is intoxicated, but not currently.  He has anhedonia.  When he is consuming alcohol he passes out.  When he is sober, his sleep is \"not good.\"  His appetite is good.  His motivation is rather low.  His energy is low.  He has some feelings of hopelessness, helplessness, worthlessness and guilt.  Concentration is \"not great.\"  His anxiety is high.  He rarely feels irritable.   He feels restless.  He has racing thoughts.  He denies panic attacks.  He denies symptoms consistent with OCD, PTSD, psychosis and tamica.     See full admission note by Veronica Sims NP 6/11/2024 for details.         Diagnoses:     Alcohol use disorder, severe, dependence  Nicotine use disorder  History of polysubstance abuse  Generalized anxiety disorder  Major depressive disorder versus alcohol-induced depressive " disorder  Hypothyroidism  Papulovesicular excoriated rash on the forearms, anterior shins, buttocks   Hypomagnesemia  Hyperlipidemia  Elevated blood pressure         Labs:      Latest Reference Range & Units 06/10/24 13:29 06/10/24 14:25 06/11/24 07:32   Sodium 135 - 145 mmol/L 141     Potassium 3.4 - 5.3 mmol/L 3.6     Chloride 98 - 107 mmol/L 103     Carbon Dioxide (CO2) 22 - 29 mmol/L 25     Urea Nitrogen 6.0 - 20.0 mg/dL 13.9     Creatinine 0.67 - 1.17 mg/dL 0.76     GFR Estimate >60 mL/min/1.73m2 >90     Calcium 8.6 - 10.0 mg/dL 9.6     Anion Gap 7 - 15 mmol/L 13     Magnesium 1.7 - 2.3 mg/dL 1.6 (L)  1.6 (L)   Albumin 3.5 - 5.2 g/dL 4.3     Protein Total 6.4 - 8.3 g/dL 7.0     Alkaline Phosphatase 40 - 150 U/L 69     ALT 0 - 70 U/L 31     AST 0 - 45 U/L 30     Bilirubin Total <=1.2 mg/dL <0.2     Cholesterol <200 mg/dL   288 (H)   GGT 8 - 61 U/L   24   Glucose 70 - 99 mg/dL 106 (H)     HDL Cholesterol >=40 mg/dL   68   LDL Cholesterol Calculated <=100 mg/dL   155 (H)   Non HDL Cholesterol <130 mg/dL   220 (H)   Triglycerides <150 mg/dL   324 (H)   TSH 0.30 - 4.20 uIU/mL   1.68   WBC 4.0 - 11.0 10e3/uL 4.3     Hemoglobin 13.3 - 17.7 g/dL 14.7     Hematocrit 40.0 - 53.0 % 42.9     Platelet Count 150 - 450 10e3/uL 179     RBC Count 4.40 - 5.90 10e6/uL 4.49     MCV 78 - 100 fL 96     MCH 26.5 - 33.0 pg 32.7     MCHC 31.5 - 36.5 g/dL 34.3     RDW 10.0 - 15.0 % 14.2     % Neutrophils % 68     % Lymphocytes % 16     % Monocytes % 13     % Eosinophils % 1     % Basophils % 1     Absolute Basophils 0.0 - 0.2 10e3/uL 0.0     Absolute Eosinophils 0.0 - 0.7 10e3/uL 0.0     Absolute Immature Granulocytes <=0.4 10e3/uL 0.0     Absolute Lymphocytes 0.8 - 5.3 10e3/uL 0.7 (L)     Absolute Monocytes 0.0 - 1.3 10e3/uL 0.6     % Immature Granulocytes % 1     Absolute Neutrophils 1.6 - 8.3 10e3/uL 3.0     Absolute NRBCs 10e3/uL 0.0     NRBCs per 100 WBC <1 /100 0     Amphetamine Qual Urine Screen Negative   Screen Negative     Fentanyl Qual Urine Screen Negative   Screen Negative    Cocaine Urine Screen Negative   Screen Negative    Benzodiazepine Urine Screen Negative   Screen Negative    Opiates Qualitative Urine Screen Negative   Screen Negative    PCP Urine Screen Negative   Screen Negative    Cannabinoids Qual Urine Screen Negative   Screen Negative    Barbiturates Qual Urine Screen Negative   Screen Negative    Alcohol ethyl <=0.01 g/dL 0.11 (H)            Consults:     Internal Medicine Consult 6/11/2024:    Assessment & Plan  Jose Alberto Cuellar is a 49 year old male admitted on 6/10/2024. He has a PMHx notable for alcohol use disorder, opioid dependence, hypothyroidism, anxiety, and depression. He presented to the ED seeking detox. He is currently admitted to [facility].      Alcohol use disorder  Acute alcohol withdrawal  Patient reports drinking 1.75L of vodka daily. Last drink was morning of ED arrival. Denies history of withdrawal seizures and DT's.   - psychiatry primary   - agree with vitamin supplementation including of thiamine and folate  - continue MSSA with valium  - PRN Tylenol, Maalox, atenolol, hydroxyzine, Zyprexa, and trazodone  - counseling, group therapies, community resources  - PTA regimen: disulfiram 250 mg tab daily      Anxiety  Depression  - psychiatry primary   - PTA regimen: atarax 25 mg q4h PRN, trazodone 50 mg at bedtime, effexor  mg daily      Papulovesicular excoriated rash   Patient says he gets this type of rash when he is drinking and then goes away when he stops drinking. The rash is located on his arms, legs, and belt-line (per chart review, locations seem to be consistent each time the rash appears). He says that the rash itches and burns. Has been treated successfully with triamcinolone cream in the past. Per chart review, he has been prescribed triamcinolone for this rash in the past. Denies contagious exposures. Ddx include but not limited to atopic dermatitis, contact dermitis,  dermatitis herpetiformis, psoriasis, drug eruption.  - start triamcinolone cream and Aquaphor   - follow up with PCP upon discharge      Hypomagnesemia  Mg 1.6 upon ED arrival, started on Mag-Ox 400 mg daily. Repeat Mg today reveals result of 1.6. Based on the Standard electrolyte replacement protocol, no replacement is needed at this time.   - encourage PO intake      Hypertriglyceridemia   Abnormal lipid panel   Per chart review, the patient has a history of high triglycerides. He was seen by his PCP in March 2024 and it was recommended he start taking fish oil with a recheck of triglycerides in 3 months (which would be around this time). Lipid panel showing cholesterol 288, HDL 68, , non-, triglycerides 324. According to the ASCVD 2013 Risk Calculator from AHA/ACC, the patient has a 6.6% risk of cardiovascular event in the next 10 years. Based on this, a moderate intensity statin is recommended. He would like to hold off on starting fish oil and a statin at this time, would like to get through acute detox first. We discussed the importance of following up with his PCP, as well as lifestyle modifications.   - follow up with PCP upon discharge, especially to determine necessity of statin initiation   - encourage lifestyle modification including healthy diet, exercise, and alcohol cessation      Elevated blood pressure  SBP of 130-170s. Patient does not have a history of hypertension nor do they take any medications for this issue at home. I suspect this is due to the acute alcohol withdrawal process and will normalize as the withdrawal resolves. Per chart review, has required PRN hydralazine in the past during acute withdrawal periods.   - start hydralazine 25 mg TID PRN for SBP > 170  - if BP fails to normalize despite completion of withdrawal, patient should follow-up with PCP within 1 week of discharge      Hypothyroidism  TSH 1.68.   - continue PTA levothyroxine      Patient is stable at this  time, Internal Medicine will sign off.         Hospital Course:     Jose Alberto Cuellar was admitted to [facility] [place] with attending Angel Bauer MD, under the direct care of Veronica Sims NP as a voluntary patient. The patient was placed under status 15 (15 minute checks) to ensure patient safety.     MSSA protocol was initiated due to the patient's history of alcohol abuse and concern for withdrawal symptoms.  He received 40 mg of Valium on day 1 and 25 mg of Valium on day 2.  Thereafter his withdrawal subsided, and the MSSA was discontinued.    Effexor  mg daily was continued.  He plans to resume Antabuse 250 mg daily when he discharged from [facility].  Trazodone 50 mg at  was continued.  Hydroxyzine 25 mg PRN was continued.  Internal medicine recommended continuing PRN Hydralazine and to follow up with PCP if BPs do not improve.    Jose Alberto Cuellar did participate in groups and was visible in the milieu.  Behavior was calm and cooperative.  He appeared motivated for treatment and recovery.  Depression and anxiety were moderate.  He denied suicidal ideation.  He ate and slept well.      Jose Alberto Cuellar was discharged to [facility].  At the time of discharge Jose Alberto Cuellar was determined to not be a danger to himself or others.          Discharge Medications:     hydrALAZINE 25 MG tablet  Commonly known as: APRESOLINE        Dose: 25 mg  Take 1 tablet (25 mg) by mouth 3 times daily as needed for high blood pressure (SBP > 170)  Quantity: 30 tablet  Refills: 1     magnesium oxide 400 MG tablet  Commonly known as: MAG-OX        Dose: 400 mg  Take 1 tablet (400 mg) by mouth daily  Quantity: 30 tablet  Refills: 1     mineral oil-hydrophilic petrolatum external ointment      Apply topically every hour as needed for dry skin or irritation  Quantity: 400 g  Refills: 1     nicotine polacrilex 4 MG gum  Commonly known as: NICORETTE        Dose: 4 mg  Place 1 each (4 mg)  inside cheek every hour as needed for nicotine withdrawal symptoms  Quantity: 120 each  Refills: 1     triamcinolone 0.5 % external cream  Commonly known as: ARISTOCORT HP      Apply topically 2 times daily for 7 days.  Apply to rash on arms and legs.  Quantity: 454 g  Refills: 0       disulfiram 250 MG tablet  Commonly known as: ANTABUSE        Dose: 250 mg  Take 1 tablet (250 mg) by mouth daily  Quantity: 30 tablet  Refills: 1     folic acid 1 MG tablet  Commonly known as: FOLVITE        Dose: 1 mg  Take 1 tablet (1 mg) by mouth daily  Quantity: 30 tablet  Refills: 1     hydrOXYzine HCl 25 MG tablet  Commonly known as: ATARAX      Dose: 25 mg  Take 1 tablet (25 mg) by mouth every 4 hours as needed for anxiety  Quantity: 90 tablet  Refills: 1     levothyroxine 75 MCG tablet  Commonly known as: SYNTHROID/LEVOTHROID      Dose: 75 mcg  Take 1 tablet (75 mcg) by mouth daily  Quantity: 30 tablet  Refills: 1     multivitamin w/minerals tablet        Dose: 1 tablet  Take 1 tablet by mouth daily  Quantity: 30 tablet  Refills: 1     nicotine 21 MG/24HR 24 hr patch  Commonly known as: NICODERM CQ        Dose: 1 patch  Place 1 patch onto the skin daily  Quantity: 28 patch  Refills: 1     thiamine 100 MG tablet  Commonly known as: B-1        Dose: 100 mg  Take 1 tablet (100 mg) by mouth daily  Quantity: 30 tablet  Refills: 1     traZODone 50 MG tablet  Commonly known as: DESYREL        Dose: 50 mg  Take 1 tablet (50 mg) by mouth at bedtime  Quantity: 30 tablet  Refills: 1     venlafaxine 75 MG 24 hr capsule  Commonly known as: EFFEXOR XR        Dose: 225 mg  Take 3 capsules (225 mg) by mouth daily (with breakfast)  Quantity: 90 capsule  Refills: 1       These medications were sent to [facility] [place], [place] - [address]  [address] [address]      Phone: [phone]   disulfiram 250 MG tablet  folic acid 1 MG tablet  hydrALAZINE 25 MG tablet  hydrOXYzine HCl 25 MG  "tablet  levothyroxine 75 MCG tablet  magnesium oxide 400 MG tablet  mineral oil-hydrophilic petrolatum external ointment  multivitamin w/minerals tablet  nicotine 21 MG/24HR 24 hr patch  nicotine polacrilex 4 MG gum  thiamine 100 MG tablet  traZODone 50 MG tablet  triamcinolone 0.5 % external cream  venlafaxine 75 MG 24 hr capsule            Psychiatric Examination:     Appearance:  awake, alert, adequate grooming, and dressed in hospital scrubs  Attitude:  cooperative  Eye Contact:  good  Mood:  moderately anxious, depressed but more hopeful  Affect:  appropriate and in normal range  Speech:  clear, coherent  Psychomotor Behavior:  no evidence of tardive dyskinesia, dystonia, or tics  Thought Process:  linear and goal oriented  Associations:  no loose associations  Thought Content:  no evidence of suicidal ideation or homicidal ideation and no evidence of psychotic thought  Insight:  fair  Judgment:  fair  Oriented to:  date, time, person, and place  Attention Span and Concentration:  fair, reports some impairment  Recent and Remote Memory:   fair to good  Language:  intact, fluent English  Fund of Knowledge:  appropriate  Muscle Strength and Tone:  normal  Gait and Station:   normal      BP (!) 162/91 (BP Location: Left arm, Patient Position: Sitting, Cuff Size: Adult Regular)   Pulse 71   Temp 98.4  F (36.9  C) (Oral)   Resp 16   Ht 1.753 m (5' 9\")   Wt 102.1 kg (225 lb)   SpO2 96%   BMI 33.23 kg/m           Discharge Plan:     Per Discharge AVS:    Summary: You were admitted to [facility] on 6/10/2024 for detoxification from Alcohol.  A medical exam was performed that included lab work. You have met with a  and opted to attend residential treatment at [facility].  Please take care and make your recovery a daily priority, Jose Alberto!  It was a pleasure working with you and the entire treatment team here wishes you the very best in your recovery!     Recommendation:  [facility]. Complete " program and follow all aftercare recommendations. Abstain from using mood altering substances.     Major Treatments, Procedures and Findings:  You were treated for Alcohol detoxification using Valium protocol. As an outpatient you will be prescribed Antabuse, please take this medication as prescribed until it is gone. You completed a chemical dependency assessment. You had labs drawn and those results were reviewed with you. Please take a copy of your lab work with you to your next primary care provider appointment.    Symptoms to Report:  If you experience more anxiety, confusion, sleeplessness, deep sadness or thoughts of suicide, notify your treatment team or notify your primary care provider. IF ANY OF THE SYMPTOMS YOU ARE EXPERIENCING ARE A MEDICAL EMERGENCY CALL 911 IMMEDIATELY.     Lifestyle Adjustment:  Health Action Plan:  1.Create a daily schedule  2. Eat Healthy  3. Plan Enjoyable Sober Activities  4. Use Problem Solving Skills and Deal with Issues as they Arise.   5. Be Physically Active  6. Take your medications as prescribed  7. Get enough restful sleep  8. Practice Relaxation  9. Spend time with Supportive People  10. No use of alcohol, illegal drugs or addictive medications other than what is currently prescribed.   11.AA, NA Sponsor are excellent resources for support  12. Explore how  you can utilize spirituality in your recovery    Disposition: [facility] - admit Thursday, 6/13/24 at 1pm    Follow-up Appointment:   Tj Correa MD   Address:  [facility] in [place], [place]  Address: [address] [place] [address] [address], [place] [address]  Phone: [phone]         Residential CD:   [facility] - admit on Thursday, 6/13/24 at 1pm      Attestation:  The patient has been seen and evaluated by me, CAROLYN Dick CNP  Discharge time > 30 minutes

## 2024-06-14 ENCOUNTER — HOSPITAL ENCOUNTER (OUTPATIENT)
Dept: BEHAVIORAL HEALTH | Facility: CLINIC | Age: 49
Discharge: HOME | End: 2024-06-14
Attending: FAMILY MEDICINE
Payer: COMMERCIAL

## 2024-06-14 PROCEDURE — 1002N00001 HC LODGING PLUS FACILITY CHARGE ADULT

## 2024-06-14 PROCEDURE — H2035 A/D TX PROGRAM, PER HOUR: HCPCS | Mod: HQ

## 2024-06-14 NOTE — GROUP NOTE
Group Therapy Documentation    PATIENT'S NAME: Jose Alberto Cuellar  MRN:   [id number]  :   1975  ACCT. NUMBER: [id number]  DATE OF SERVICE: 24  START TIME: 12:30 PM  END TIME:  2:30 PM  FACILITATOR(S): Pebbles Herrera LADC  TOPIC: BEH Group Therapy  Number of patients attending the group:  7    Group Length:  2 Hours    Group Therapy Type: Recovery strategies, Emotion processing, and Daily living/independence skills    Summary of Group / Topics Discussed:    Recovery Principles, Sober coping skills, Relationship/socialization, and Relapse prevention      Group Attendance:  Attended group session    Patient's response to the group topic/interactions:  cooperative with task    Patient appeared to be Actively participating, Attentive, and Engaged.        Client specific details:  Patient attended group movie session and was attentive and participative.

## 2024-06-14 NOTE — GROUP NOTE
Group Therapy Documentation    PATIENT'S NAME: Jose Alberto Cuellar  MRN:   [id number]  :   1975  ACCT. NUMBER: [id number]  DATE OF SERVICE: 24  START TIME:  9:00 AM  END TIME: 11:00 AM  FACILITATOR(S): Veronica Castellano LADC  TOPIC: BEH Group Therapy  Number of patients attending the group:  7  Group Length:  2 Hours    Group Therapy Type: Recovery strategies    Summary of Group / Topics Discussed:    Relationship/socialization and Relapse prevention    Group Attendance:  Attended group session    Patient's response to the group topic/interactions:  cooperative with task    Patient appeared to be Actively participating, Attentive, and Engaged.        Client specific details: Pt reported feeling depressed and sad yet relieved and hopeful, and shared that his favorite summer pastime is being outdoors in any and every way. He took part in a group discussion of how to ask for support and engaging in social/recreational activities while sober.

## 2024-06-15 ENCOUNTER — HOSPITAL ENCOUNTER (OUTPATIENT)
Dept: BEHAVIORAL HEALTH | Facility: CLINIC | Age: 49
Discharge: HOME | End: 2024-06-15
Attending: FAMILY MEDICINE
Payer: COMMERCIAL

## 2024-06-15 PROCEDURE — H2035 A/D TX PROGRAM, PER HOUR: HCPCS | Mod: HQ

## 2024-06-15 PROCEDURE — 1002N00001 HC LODGING PLUS FACILITY CHARGE ADULT

## 2024-06-15 PROCEDURE — H2035 A/D TX PROGRAM, PER HOUR: HCPCS | Mod: HQ | Performed by: COUNSELOR

## 2024-06-15 NOTE — GROUP NOTE
"Group Therapy Documentation    PATIENT'S NAME: Jose Alberto Cuellar  MRN:   [id number]  :   1975  ACCT. NUMBER: [id number]  DATE OF SERVICE: 6/15/24  START TIME:  9:00 AM  END TIME: 11:00 AM  FACILITATOR(S): Shawnee Wyatt LADC  TOPIC: BEH Group Therapy  Number of patients attending the group:  23  Group Length:  2 Hours    Group Therapy Type: Emotion processing and Relationship    Summary of Group / Topics Discussed:    Recovery Principles and Relationship/socialization  Setting Boundaries, True Colors Trait Activity, Episode series of \"Mom's\" relating to different types of boundaries. Feedback and discussion.      Group Attendance:  Attended group session    Patient's response to the group topic/interactions:  cooperative with task    Patient appeared to be Engaged.        Client specific details:  Patient attended AM group session. Patient shared appropriate feedbacks during discussion.    "

## 2024-06-15 NOTE — GROUP NOTE
Group Therapy Documentation    PATIENT'S NAME: Jose Alberto Cuellar  MRN:   [id number]  :   1975  ACCT. NUMBER: [id number]  DATE OF SERVICE: 6/15/24  START TIME: 12:30 PM  END TIME:  2:30 PM  FACILITATOR(S): Kimmie Garcia LADC  TOPIC: BEH Group Therapy  Number of patients attending the group:  23 plus facilitator, plus ROWAN  Group Length:  2 Hours    Group Therapy Type: Recovery strategies and Emotion processing    Summary of Group / Topics Discussed:    Recovery Principles, Relationship/socialization, Emotions/expression, and Self-care activities    Writer facilitated a psychotherapy group discussing co-dependency, the need for connection in relationships, the barriers to connection, and strategies to increase satisfaction in relationships.  Patients identified behavior patterns they have experienced in the past as well as patterns observed in others.  Writer provided psycho-education about use of power in family systems, how to identify a power struggle, and actions to take to exit the power struggle.  Patients are recommended to continue to attend psychotherapy groups daily until discharge.        Group Attendance:  Attended group session    Patient's response to the group topic/interactions:  cooperative with task, discussed personal experience with topic, and listened actively    Patient appeared to be Actively participating, Attentive, and Engaged.        Client specific details:  Patient actively participated in today's afternoon group.  Patient verbally identified power dynamics they demonstrate at times within relationships and roles others often play in relationships with them.  Patient identified and practiced a skill to become present for relationships with others.  Patient reported increased insight into strategies for regulation within their sobriety following group.

## 2024-06-16 ENCOUNTER — HOSPITAL ENCOUNTER (OUTPATIENT)
Dept: BEHAVIORAL HEALTH | Facility: CLINIC | Age: 49
Discharge: HOME | End: 2024-06-16
Attending: FAMILY MEDICINE
Payer: COMMERCIAL

## 2024-06-16 PROCEDURE — H2035 A/D TX PROGRAM, PER HOUR: HCPCS | Mod: HQ

## 2024-06-16 PROCEDURE — 1002N00001 HC LODGING PLUS FACILITY CHARGE ADULT

## 2024-06-16 PROCEDURE — H2035 A/D TX PROGRAM, PER HOUR: HCPCS | Mod: HQ | Performed by: COUNSELOR

## 2024-06-16 NOTE — GROUP NOTE
Group Therapy Documentation    PATIENT'S NAME: Jose Alberto Cuellar  MRN:   [id number]  :   1975  ACCT. NUMBER: [id number]  DATE OF SERVICE: 24  START TIME: 12:30 PM  END TIME:  1:30 PM  FACILITATOR(S): Kimmie Garcia LADC  TOPIC: BEH Group Therapy  Number of patients attending the group:  22  Group Length:  1 Hours    Group Therapy Type: Emotion processing    Summary of Group / Topics Discussed:    Sober coping skills and Emotions/expression    Writer facilitated group with a guided meditation on gratitude and utilizing a video describing the link between gratitude and katelin.  Then patients participated in an activity where they demonstrated expressions of katelin.        Group Attendance:  Attended group session    Patient's response to the group topic/interactions:  cooperative with task and listened actively    Patient appeared to be Actively participating, Attentive, and Engaged.        Client specific details:  Jose Alberto actively participated in both activities for afternoon group.  Jose Alberto verbalized increased sense of katelin following activities..

## 2024-06-16 NOTE — GROUP NOTE
Psychoeducation Group Documentation    PATIENT'S NAME: Jose Alberto Cuellar  MRN:   [id number]  :   1975  ACCT. NUMBER: [id number]  DATE OF SERVICE: 24  START TIME:  8:45 AM  END TIME: 10:45 AM  FACILITATOR(S): Kimmie Garcia LADC  TOPIC: BEH Pyschoeducation  Number of patients attending the group:  22  Group Length:  2 Hours    Skills Group Therapy Type: Emotion regulation skills and Healthy behaviors development    Summary of Group / Topics Discussed:    Balanced lifestyle skills, Symptom management skills, and Relapse prevention skills  Writer audited group led by RN on Hepititis A, B, and C.  Included in group was psychoeducation on different types of Hepatitis, causes, routes of transmission, and treatments.  RN also discussed a recovery skill of laughing breath work to increase endorphins and to increase overall life satisfaction.            Group Attendance:  {Group Attendance:198825}    Patient's response to the group topic/interactions:  {OPBEHCLIENTRESPONSE:204065}    Patient appeared to be {Engagement:129412}.         Client specific details:  ***.

## 2024-06-16 NOTE — GROUP NOTE
Group Therapy Documentation    PATIENT'S NAME: Jose Alberto Cuellar  MRN:   [id number]  :   1975  ACCT. NUMBER: [id number]  DATE OF SERVICE: 24  START TIME:  8:45 AM  END TIME: 10:30 AM  FACILITATOR(S): Randi Lord LADC; Celi Dumont RN; Kimmie Garcia LADC  TOPIC: BEH Group Therapy  Number of patients attending the group:  22  Group Length:  2 Hours    Group Therapy Type: Health and wellbeing     Summary of Group / Topics Discussed:    Hepatitis C      Group Attendance:  Attended group session    Patient's response to the group topic/interactions:  cooperative with task    Patient appeared to be Actively participating, Attentive, and Engaged.        Client specific details: Jose Alberto was an active participant in RN lecture on Hepatitis A, B, and C. They also participated in a laughter yoga exercise.

## 2024-06-17 ENCOUNTER — HOSPITAL ENCOUNTER (OUTPATIENT)
Dept: BEHAVIORAL HEALTH | Facility: CLINIC | Age: 49
Discharge: HOME | End: 2024-06-17
Attending: FAMILY MEDICINE
Payer: COMMERCIAL

## 2024-06-17 PROCEDURE — H2035 A/D TX PROGRAM, PER HOUR: HCPCS | Mod: HQ

## 2024-06-17 PROCEDURE — 1002N00001 HC LODGING PLUS FACILITY CHARGE ADULT

## 2024-06-17 NOTE — PROGRESS NOTES
Acknowledgement of Current Treatment Plan - Initial Treatment Plan     INITIAL TREATMENT PLAN:     1. I have participated in creating my treatment plan with my therapist / counselor on 6/17/24. I agree with the plan as it is written in the electronic health record.    Name Signature/Date   Patient     Name of Therapist / Counselor   Signature/Date   Counselor      2. I have completed and reviewed my Safety Plan with my counselor and signed this on 6/17/24. I have been given the hard copy of this plan.    Patient signature/date:      _____________________________________________________________________________    3. Last Use Date: 6/10/24.    Patient signature/date:     _____________________________________________________________________________

## 2024-06-17 NOTE — GROUP NOTE
Group Therapy Documentation    PATIENT'S NAME: Jose Alberto Cuellar  MRN:   [id number]  :   1975  ACCT. NUMBER: [id number]  DATE OF SERVICE: 24  START TIME: 12:30 PM  END TIME:  2:30 PM  FACILITATOR(S): Veronica Castellano LADC  TOPIC: BEH Group Therapy  Number of patients attending the group:  7  Group Length:  2 Hours    Group Therapy Type: Recovery strategies    Summary of Group / Topics Discussed:    Relationship/socialization and Relapse prevention      Group Attendance:  Attended group session    Patient's response to the group topic/interactions:  cooperative with task    Patient appeared to be Actively participating, Attentive, and Engaged.        Client specific details: Pt offered supportive feedback to his peer on his relapse prevention plan, and took part in a group discussion about codependency and enabling.

## 2024-06-17 NOTE — PROGRESS NOTES
Name: Jose Alberto Cuellar  Date: 6/17/2024  Medical Record: [id number]  Envelope Number: [id number]  List of Contents (List each item separately in new row):     Disulfiram 250 mg Tabs    Admission:  I am responsible for any personal items that are not sent to the safe or pharmacy.  [facility] is not responsible for loss, theft or damage of any property in my possession.    Patient Signature:  ___________________________________________       Date/Time:__________________________    Staff Signature: __________________________________       Date/Time:__________________________    [facility] Staff person, if patient is unable/unwilling to sign:      __________________________________________________________       Date/Time: __________________________    **All medications are packaged by [facility] staff and securely stored on Lodging plus. Medications left by patients at discharge will be packaged by [facility] staff and transported by [facility] staff to inpatient pharmacy for storage.**    Discharge:  [facility] has returned all of my personal belongings:    Patient Signature: ________________________________________     Date/Time: ____________________________________    Staff Signature: ______________________________________     Date/Time:_____________________________________

## 2024-06-17 NOTE — GROUP NOTE
Group Therapy Documentation    PATIENT'S NAME: Jose Alberto Cuellar  MRN:   [id number]  :   1975  ACCT. NUMBER: [id number]  DATE OF SERVICE: 24  START TIME:  9:00 AM  END TIME: 11:00 AM  FACILITATOR(S): Randi Lord LADC  TOPIC: BEH Group Therapy  Number of patients attending the group:  7  Group Length:  2 Hours    Group Therapy Type: Emotion processing    Summary of Group / Topics Discussed:    Disease of addiction and Emotions/expression      Group Attendance:  Attended group session    Patient's response to the group topic/interactions:  cooperative with task    Patient appeared to be Actively participating, Attentive, and Engaged.        Client specific details: Jose Alberto was an active participant in morning group therapy session. He engaged in a discussion on goal-setting and vulnerability.

## 2024-06-18 ENCOUNTER — HOSPITAL ENCOUNTER (OUTPATIENT)
Dept: BEHAVIORAL HEALTH | Facility: CLINIC | Age: 49
Discharge: HOME | End: 2024-06-18
Attending: FAMILY MEDICINE
Payer: COMMERCIAL

## 2024-06-18 LAB
NORDIAZEPAM UR QL CFM: PRESENT
OXAZEPAM UR QL CFM: PRESENT
TEMAZEPAM UR QL CFM: PRESENT

## 2024-06-18 PROCEDURE — 1002N00001 HC LODGING PLUS FACILITY CHARGE ADULT

## 2024-06-18 PROCEDURE — H2035 A/D TX PROGRAM, PER HOUR: HCPCS | Mod: HQ

## 2024-06-18 NOTE — GROUP NOTE
Group Therapy Documentation    PATIENT'S NAME: Jose Alberto Cuellar  MRN:   [id number]  :   1975  ACCT. NUMBER: [id number]  DATE OF SERVICE: 24  START TIME:  9:00 AM  END TIME: 11:00 AM  FACILITATOR(S): Johann Townsend LADC  TOPIC: BEH Group Therapy  Number of patients attending the group:  7  Group Length:  2 Hours    Group Therapy Type: Recovery strategies    Summary of Group / Topics Discussed:    Recovery Principles, Mindfulness/Relaxation, Coping/DBT informed care, Trauma informed care, Disease of addiction, Emotions/expression, and Relapse prevention      Group Attendance:  Attended group session    Patient's response to the group topic/interactions:  cooperative with task    Patient appeared to be Attentive and Engaged.        Client specific details:  Jose Alberto participated in morning group. He checked in and took part in the reading and discussion on unmanageably and feeling the loss of control when we are using. For the remainder of the group the members had a chance to process what was going on with them and to provide support and feedback to their peers.

## 2024-06-18 NOTE — GROUP NOTE
"Group Therapy Documentation    PATIENT'S NAME: Jose Alberto Cuellar  MRN:   [id number]  :   1975  ACCT. NUMBER: [id number]  DATE OF SERVICE: 24  START TIME: 12:30 PM  END TIME:  2:30 PM  FACILITATOR(S): Veronica Castellano LADC  TOPIC: BEH Group Therapy  Number of patients attending the group:  7  Group Length:  2 Hours    Group Therapy Type: Recovery strategies    Summary of Group / Topics Discussed:    Recovery Principles and Relapse prevention    Group Attendance:  Attended group session    Patient's response to the group topic/interactions:  cooperative with task    Patient appeared to be Actively participating, Attentive, and Engaged.        Client specific details: Pt presented his \"Own Reasons to Quit\" assignment and was receptive to feedback. He offered his peer feedback on his relapse prevention plan, and took part in a group discussion of setting boundaries with using friends and defense mechanisms.  "

## 2024-06-18 NOTE — GROUP NOTE
Group Therapy Documentation    PATIENT'S NAME: Jose Alberto Cuellar  MRN:   [id number]  :   1975  ACCT. NUMBER: [id number]  DATE OF SERVICE: 24  START TIME:  3:00 PM  END TIME:  4:00 PM  FACILITATOR(S): Veronica Castellano LADC  TOPIC: BEH Group Therapy  Number of patients attending the group:  18  Group Length:  1 Hours    Group Therapy Type: Daily living/independence skills    Summary of Group / Topics Discussed:    Relationship/socialization    Group Attendance:  Attended group session    Patient's response to the group topic/interactions:  cooperative with task    Patient appeared to be Actively participating, Attentive, and Engaged.        Client specific details: Pt took part in a group activity intended to uncover similarities with peers and humanize those coping with addiction.

## 2024-06-19 ENCOUNTER — HOSPITAL ENCOUNTER (OUTPATIENT)
Dept: BEHAVIORAL HEALTH | Facility: CLINIC | Age: 49
Discharge: HOME | End: 2024-06-19
Attending: FAMILY MEDICINE
Payer: COMMERCIAL

## 2024-06-19 PROCEDURE — H2035 A/D TX PROGRAM, PER HOUR: HCPCS | Mod: HQ

## 2024-06-19 PROCEDURE — 1002N00001 HC LODGING PLUS FACILITY CHARGE ADULT

## 2024-06-19 NOTE — PROGRESS NOTES
Name: Jose Alberto Cuellar  Date: 6/19/2024  Medical Record: [id number]  Envelope Number: [id number]  List of Contents (List each item separately in new row):   iPad  Admission:  I am responsible for any personal items that are not sent to the safe or pharmacy.  [facility] is not responsible for loss, theft or damage of any property in my possession.      Patient Signature:  ___________________________________________       Date/Time:__________________________    Staff Signature: __________________________________       Date/Time:__________________________    [facility] Staff person, if patient is unable/unwilling to sign:    __________________________________________________________       Date/Time: __________________________  **All medications are packaged by [facility] staff and securely stored on Lodging plus. Medications left by patients at discharge will be packaged by [facility] staff and transported by [facility] staff to inpatient pharmacy for storage.**        Discharge:  [facility] has returned all of my personal belongings:    Patient Signature: ________________________________________     Date/Time: ____________________________________    Staff Signature: ______________________________________     Date/Time:_____________________________________

## 2024-06-19 NOTE — GROUP NOTE
Group Therapy Documentation    PATIENT'S NAME: Jose Alberto Cuellar  MRN:   [id number]  :   1975  ACCT. NUMBER: [id number]  DATE OF SERVICE: 24  START TIME: 12:30 PM  END TIME:  2:30 PM  FACILITATOR(S): Johann Townsend LADC  TOPIC: BEH Group Therapy  Number of patients attending the group:  6  Group Length:  2 Hours    Group Therapy Type: Recovery strategies    Summary of Group / Topics Discussed:    Recovery Principles, Mindfulness/Relaxation, Coping/DBT informed care, Trauma informed care, Disease of addiction, Emotions/expression, and Relapse prevention      Group Attendance:  Attended group session    Patient's response to the group topic/interactions:  cooperative with task    Patient appeared to be Attentive and Engaged.        Client specific details:  Jose Alberto participated in afternoon group. He listened to and gave positive feedback on his peers assignments.

## 2024-06-19 NOTE — GROUP NOTE
Group Therapy Documentation    PATIENT'S NAME: Jose Alberto Cuellar  MRN:   [id number]  :   1975  ACCT. NUMBER: [id number]  DATE OF SERVICE: 24  START TIME:  9:45 AM  END TIME: 11:30 AM  FACILITATOR(S): Randi Lord LADC  TOPIC: BEH Group Therapy  Number of patients attending the group:  5  Group Length:  2 Hours    Group Therapy Type: Emotion processing    Summary of Group / Topics Discussed:    Sober coping skills and Emotions/expression      Group Attendance:  Attended group session    Patient's response to the group topic/interactions:  cooperative with task    Patient appeared to be Actively participating, Attentive, and Engaged.        Client specific details: Jose Alberto was an active participant in morning group therapy session.

## 2024-06-19 NOTE — GROUP NOTE
Group Therapy Documentation    PATIENT'S NAME: Jose Alberto Cuellar  MRN:   [id number]  :   1975  ACCT. NUMBER: [id number]  DATE OF SERVICE: 24  START TIME:  8:30 AM  END TIME:  9:30 AM  FACILITATOR(S): Constantine Malloy LADC  TOPIC: BEH Group Therapy  Number of patients attending the group:  7  Group Length:  1 Hours    Group Therapy Type: Health and wellbeing     Summary of Group / Topics Discussed:    Disease of addiction      Group Attendance:  Attended group session    Patient's response to the group topic/interactions:  cooperative with task    Patient appeared to be Actively participating.        Client specific details:  Jose Alberto participated and interacted appropriately with peers and staff in skills group. No triggers to use noted or discussed.

## 2024-06-20 ENCOUNTER — HOSPITAL ENCOUNTER (OUTPATIENT)
Dept: BEHAVIORAL HEALTH | Facility: CLINIC | Age: 49
Discharge: HOME | End: 2024-06-20
Attending: FAMILY MEDICINE
Payer: COMMERCIAL

## 2024-06-20 ENCOUNTER — OFFICE VISIT (OUTPATIENT)
Dept: FAMILY MEDICINE | Facility: CLINIC | Age: 49
End: 2024-06-20
Payer: COMMERCIAL

## 2024-06-20 VITALS
SYSTOLIC BLOOD PRESSURE: 143 MMHG | OXYGEN SATURATION: 98 % | RESPIRATION RATE: 22 BRPM | BODY MASS INDEX: 32.95 KG/M2 | DIASTOLIC BLOOD PRESSURE: 87 MMHG | HEART RATE: 70 BPM | WEIGHT: 222.5 LBS | HEIGHT: 69 IN | TEMPERATURE: 99 F

## 2024-06-20 DIAGNOSIS — F10.91 ALCOHOL USE DISORDER IN REMISSION: ICD-10-CM

## 2024-06-20 DIAGNOSIS — E78.1 HYPERTRIGLYCERIDEMIA: ICD-10-CM

## 2024-06-20 DIAGNOSIS — R03.0 ELEVATED BLOOD PRESSURE READING WITHOUT DIAGNOSIS OF HYPERTENSION: ICD-10-CM

## 2024-06-20 DIAGNOSIS — L40.9 PSORIASIS: Primary | ICD-10-CM

## 2024-06-20 PROCEDURE — H2035 A/D TX PROGRAM, PER HOUR: HCPCS | Mod: HQ

## 2024-06-20 PROCEDURE — 1002N00001 HC LODGING PLUS FACILITY CHARGE ADULT

## 2024-06-20 PROCEDURE — 99213 OFFICE O/P EST LOW 20 MIN: CPT

## 2024-06-20 ASSESSMENT — PAIN SCALES - GENERAL: PAINLEVEL: NO PAIN (0)

## 2024-06-20 NOTE — GROUP NOTE
Group Therapy Documentation    PATIENT'S NAME: Jose Alberto Cuellar  MRN:   [id number]  :   1975  ACCT. NUMBER: [id number]  DATE OF SERVICE: 24  START TIME: 12:30 PM  END TIME:  2:30 PM  FACILITATOR(S): Randi Lord LADC; Adwoa Armenta  TOPIC: BEH Group Therapy  Number of patients attending the group:  6  Group Length:  2 Hours    Group Therapy Type: Emotion processing    Summary of Group / Topics Discussed:    Spiritual Care      Group Attendance:  Attended group session    Patient's response to the group topic/interactions:  cooperative with task    Patient appeared to be Actively participating, Attentive, and Engaged.        Client specific details: Jose Alberto was an active participant in spiritual care group session led by Adwoa Armenta.

## 2024-06-20 NOTE — GROUP NOTE
Psychoeducation Group Documentation    PATIENT'S NAME: Jose Alberto Cuellar  MRN:   [id number]  :   1975  ACCT. NUMBER: [id number]  DATE OF SERVICE: 24  START TIME:  3:00 PM  END TIME:  4:00 PM  FACILITATOR(S): Camilla Escalera LADC; Johann Mercer  TOPIC: BEH Pyschoeducation  Number of patients attending the group:  6  Group Length:  1 Hours    Skills Group Therapy Type: Daily living/independence skills, Healthy behaviors development, and Medication education    Summary of Group / Topics Discussed:      Skills group was presented by: Dr. Tinoco on  Dual Diagnosis . The patients were engaged in Q&A during and after presentation and resources were provided for future assistance that would be beneficial and can assist in abstinence, goals, and to help support recovery.        Group Attendance:  Attended group session    Patient's response to the group topic/interactions:  cooperative with task    Patient appeared to be Engaged.         Client specific details:  Jose Alberto engaged in discussion after Dr. Tinoco presentation.

## 2024-06-20 NOTE — PROGRESS NOTES
"  Assessment & Plan     Psoriasis  - Adult Dermatology  Referral; Future  Symptoms likely fit with psoriasis given the silvery plaque.  Lesions are very itchy and he does have  a positive Koebner sign.  I am wondering if he might be getting some periodic superficial bacterial infections from scratching his lesions, as some of them have a slight staph appearance.  Currently lesions are doing better than his normal.  Recommend continuing his triamcinolone and following up with dermatology.    Alcohol use disorder in remission  Doing well with sobriety.  Currently 10 days sober.    Hypertriglyceridemia  Has high triglycerides.  We offered him retesting of his cholesterol and triglycerides, but he had opted for a future visit.    Elevated blood pressure reading without diagnosis of hypertension  Elevated blood pressure noted in clinic today.  Recommend follow-up in 1 month related to sobriety reassessment and blood pressure reassessment.    Post hospital Follow-up:   Discharge medication: continue without change    Manisha Abrams is a 49 year old, presenting for the following health issues:  Rashes worse after he quit smoking/vaping.  Cardio makes it go away. Sauna makes it get better.  Uses triamcinolone, which does help.Itching makes it better.    Went to [facility] and in for 3 more weeks (had stopped antabuse prior to hospitalization). 1.75L daily before going into   ER F/U      6/20/2024     4:52 PM   Additional Questions   Roomed by Nargis Maciel     [facility]     ED/ Followup:    Facility:  [facility]  Date of visit: 6/10/24  Reason for visit: Detox  Current Status: Better    Objective    BP (!) 143/87   Pulse 70   Temp 99  F (37.2  C) (Temporal)   Resp 22   Ht 1.747 m (5' 8.78\")   Wt 100.9 kg (222 lb 8 oz)   SpO2 98%   BMI 33.07 kg/m    Body mass index is 33.07 kg/m .  Physical Exam   GENERAL: alert and no distress  SKIN: silvery plaque noted on medial leg. Red papules noted with scabbed " [facility]     Media Information    Document Information    Other: Photograph   R leg   06/20/2024 5:28 PM   Attached To:   Office Visit on 6/20/24 with Nick Cuellar NP   Source Information    Nick Cuellar NP [facility]   Document History       Media Information    Document Information    Other: Photograph   Left leg   06/20/2024 5:28 PM   Attached To:   Office Visit on 6/20/24 with Nick Cuellar NP   Source Information    Nick Cuellra NP [facility]   Document History      PSYCH: mentation appears normal, affect normal/bright            Signed Electronically by: Nick Cuellar NP

## 2024-06-20 NOTE — GROUP NOTE
Group Therapy Documentation    PATIENT'S NAME: Jose Alberto Cuellar  MRN:   [id number]  :   1975  ACCT. NUMBER: [id number]  DATE OF SERVICE: 24  START TIME:  9:00 AM  END TIME: 11:00 AM  FACILITATOR(S): Johann Townsend LADC; Constantine Malloy LADC  TOPIC: BEH Group Therapy  Number of patients attending the group:  6  Group Length:  2 Hours    Group Therapy Type: Recovery strategies    Summary of Group / Topics Discussed:    Recovery Principles, Mindfulness/Relaxation, Coping/DBT informed care, Trauma informed care, Disease of addiction, Emotions/expression, and Relapse prevention      Group Attendance:  Attended group session    Patient's response to the group topic/interactions:  cooperative with task    Patient appeared to be Attentive and Engaged.        Client specific details:  Jose Alberto participated in morning group which consisted of an recovery activity followed by a discussion.

## 2024-06-21 ENCOUNTER — HOSPITAL ENCOUNTER (OUTPATIENT)
Dept: BEHAVIORAL HEALTH | Facility: CLINIC | Age: 49
Discharge: HOME | End: 2024-06-21
Attending: FAMILY MEDICINE
Payer: COMMERCIAL

## 2024-06-21 PROCEDURE — 1002N00001 HC LODGING PLUS FACILITY CHARGE ADULT

## 2024-06-21 PROCEDURE — H2035 A/D TX PROGRAM, PER HOUR: HCPCS | Mod: HQ

## 2024-06-21 NOTE — GROUP NOTE
Group Therapy Documentation    PATIENT'S NAME: Jose Alberto Cuellar  MRN:   [id number]  :   1975  ACCT. NUMBER: [id number]  DATE OF SERVICE: 24  START TIME: 12:30 AM  END TIME:  2:30 PM  FACILITATOR(S): Johann Townsend LADC  TOPIC: BEH Group Therapy  Number of patients attending the group:  7  Group Length:  2 Hours    Group Therapy Type: Recovery strategies    Summary of Group / Topics Discussed:    Recovery Principles, Mindfulness/Relaxation, Coping/DBT informed care, Trauma informed care, Disease of addiction, and Emotions/expression      Group Attendance:  Attended group session    Patient's response to the group topic/interactions:  cooperative with task    Patient appeared to be Attentive and Engaged.        Client specific details:  Jose Alberto participated in the afternoon group which was a sober activity followed by a discussion on making amends, taking accountability for our actions, and finding freedom in sobriety.

## 2024-06-21 NOTE — GROUP NOTE
"Group Therapy Documentation    PATIENT'S NAME: Jose Alberto Cuellar  MRN:   [id number]  :   1975  ACCT. NUMBER: [id number]  DATE OF SERVICE: 24  START TIME:  9:00 AM  END TIME: 11:00 AM  FACILITATOR(S): Veronica Castellano LADC  TOPIC: BEH Group Therapy  Number of patients attending the group:  6  Group Length:  2 Hours    Group Therapy Type: Recovery strategies    Summary of Group / Topics Discussed:    Recovery Principles, Balanced lifestyle, and Relapse prevention    Group Attendance:  Attended group session    Patient's response to the group topic/interactions:  cooperative with task    Patient appeared to be Actively participating, Attentive, and Engaged.        Client specific details: Pt reported feeling calm and hopeful, and shared that while in treatment, he has been surprised by the realization of how quickly his condition deteriorated upon his relapse. He offered supportive feedback to his peer on his \"Lifestyle Evaluation\" assignment.  "

## 2024-06-22 ENCOUNTER — HOSPITAL ENCOUNTER (OUTPATIENT)
Dept: BEHAVIORAL HEALTH | Facility: CLINIC | Age: 49
Discharge: HOME | End: 2024-06-22
Attending: FAMILY MEDICINE
Payer: COMMERCIAL

## 2024-06-22 PROCEDURE — 1002N00001 HC LODGING PLUS FACILITY CHARGE ADULT

## 2024-06-22 PROCEDURE — H2035 A/D TX PROGRAM, PER HOUR: HCPCS | Mod: HQ

## 2024-06-22 NOTE — GROUP NOTE
Group Therapy Documentation    PATIENT'S NAME: Jose Alberto Cuellar  MRN:   [id number]  :   1975  ACCT. NUMBER: [id number]  DATE OF SERVICE: 24  START TIME: 12:30 PM  END TIME:  2:30 PM  FACILITATOR(S): Pebbles Herrera LADC; Yung Valverde LADC  TOPIC: BEH Group Therapy  Number of patients attending the group:  22  Group Length:  2 Hours    Group Therapy Type: Recovery strategies, Daily living/independence skills, and Health and wellbeing     Summary of Group / Topics Discussed:    Recovery Principles, Sober coping skills, and Disease of addiction    Group lecture was presented by counseling staff regarding the topic of key factor to success. Participants provided feedback throughout group session.       Group Attendance:  Attended group session    Patient's response to the group topic/interactions:  cooperative with task    Patient appeared to be Actively participating.        Client specific details:  Patient actively engaged in group discussion.

## 2024-06-22 NOTE — GROUP NOTE
Group Therapy Documentation    PATIENT'S NAME: Jose Alberto Cuellar  MRN:   [id number]  :   1975  ACCT. NUMBER: [id number]  DATE OF SERVICE: 24  START TIME:  9:00 AM  END TIME: 11:00 AM  FACILITATOR(S): Pebbles Herrera LADC; Yung Valverde LADC  TOPIC: BEH Group Therapy  Number of patients attending the group:  22  Group Length:  2 Hours    Group Therapy Type: Recovery strategies, Emotion processing, and Daily living/independence skills    Summary of Group / Topics Discussed:    Disease of addiction and Relapse prevention    Group began with an outside speaker sharing is story of addiction and recovery, followed by a lecture on relapse prevention skills provided [facility] staff. Feedback was provided by participants throughout group session.       Group Attendance:  Attended group session    Patient's response to the group topic/interactions:  cooperative with task    Patient appeared to be Actively participating.        Client specific details:  Patient actively engaged in group lecture/discussion.

## 2024-06-23 ENCOUNTER — HOSPITAL ENCOUNTER (OUTPATIENT)
Dept: BEHAVIORAL HEALTH | Facility: CLINIC | Age: 49
Discharge: HOME | End: 2024-06-23
Attending: FAMILY MEDICINE
Payer: COMMERCIAL

## 2024-06-23 PROCEDURE — H2035 A/D TX PROGRAM, PER HOUR: HCPCS | Mod: HQ

## 2024-06-23 PROCEDURE — 1002N00001 HC LODGING PLUS FACILITY CHARGE ADULT

## 2024-06-23 NOTE — GROUP NOTE
Group Therapy Documentation    PATIENT'S NAME: Jose Alberto Cuellar  MRN:   [id number]  :   1975  ACCT. NUMBER: [id number]  DATE OF SERVICE: 24  START TIME: 12:30 PM  END TIME:  1:30 PM  FACILITATOR(S): Pebbles Herrera LADC  TOPIC: BEH Group Therapy    Group length: 1 Hour    Group therapy type: Emotion processing    Summary of group/topics discussed: Emotion expression, relapse prevention      Group Attendance:  Attended group session    Patient's response to the group topic/interactions:  cooperative with task    Patient appeared to be Actively participating, Attentive, and Engaged.        Client specific details:  Patient attended lecture on emotional expression and was attentive and participative.

## 2024-06-23 NOTE — GROUP NOTE
Group Therapy Documentation    PATIENT'S NAME: Jose Alberto Cuellar  MRN:   [id number]  :   1975  ACCT. NUMBER: [id number]  DATE OF SERVICE: 24  START TIME:  8:45 AM  END TIME: 10:30 AM  FACILITATOR(S): Kylee Major RN; Pebbles Herrera LADC; Yugn Valverde LADC  TOPIC: BEH Group Therapy  Number of patients attending the group:  22  Group Length:  2 Hours    Group Therapy Type: Recovery strategies, Daily living/independence skills, and Health and wellbeing     Summary of Group / Topics Discussed:    Balanced lifestyle and Self-care activities    Group was presented by nursing staff regarding the topic of healthy eating habits as an aid in recovery. Participants provided feedback throughout group session.       Group Attendance:  Attended group session    Patient's response to the group topic/interactions:  cooperative with task    Patient appeared to be Actively participating.        Client specific details:  Patient actively engaged in group  lecture/discussion.

## 2024-06-23 NOTE — PROGRESS NOTES
Name: Jose Alberto Cuellar  Date: 6/23/2024  Medical Record: [id number]    Envelope Number: [id number]    List of Contents (List each item separately in new row):   Nicotine 21mg/24hr PT24    Admission:  I am responsible for any personal items that are not sent to the safe or pharmacy.  [facility] is not responsible for loss, theft or damage of any property in my possession.    Patient Signature:  ___________________________________________       Date/Time:__________________________    Staff Signature: __________________________________       Date/Time:__________________________    [facility] Staff person, if patient is unable/unwilling to sign:      __________________________________________________________       Date/Time: __________________________    **All medications are packaged by [facility] staff and securely stored on [facility]. Medications left by patients at discharge will be packaged by [facility] staff and transported by [facility] staff to inpatient pharmacy for storage.**  Discharge:  [facility] has returned all of my personal belongings:    Patient Signature: ________________________________________     Date/Time: ____________________________________    Staff Signature: ______________________________________     Date/Time:_____________________________________

## 2024-06-24 ENCOUNTER — HOSPITAL ENCOUNTER (OUTPATIENT)
Dept: BEHAVIORAL HEALTH | Facility: CLINIC | Age: 49
Discharge: HOME | End: 2024-06-24
Attending: FAMILY MEDICINE
Payer: COMMERCIAL

## 2024-06-24 PROCEDURE — H2035 A/D TX PROGRAM, PER HOUR: HCPCS | Mod: HQ

## 2024-06-24 PROCEDURE — 1002N00001 HC LODGING PLUS FACILITY CHARGE ADULT

## 2024-06-24 NOTE — GROUP NOTE
Group Therapy Documentation    PATIENT'S NAME: Jose Alberto Cuellar  MRN:   [id number]  :   1975  ACCT. NUMBER: [id number]  DATE OF SERVICE: 24  START TIME:  9:00 AM  END TIME: 11:00 AM  FACILITATOR(S): Randi Lord LADC  TOPIC: BEH Group Therapy  Number of patients attending the group:  8  Group Length:  2 Hours    Group Therapy Type: Emotion processing    Summary of Group / Topics Discussed:    Recovery Principles, Disease of addiction, and Emotions/expression      Group Attendance:  Attended group session    Patient's response to the group topic/interactions:  cooperative with task    Patient appeared to be Actively participating, Attentive, and Engaged.        Client specific details: Jose Alberto was an active participant in morning group therapy session.

## 2024-06-24 NOTE — GROUP NOTE
"Group Therapy Documentation    PATIENT'S NAME: Jose Alberto Cuellar  MRN:   [id number]  :   1975  ACCT. NUMBER: [id number]  DATE OF SERVICE: 24  START TIME: 12:30 PM  END TIME:  2:30 PM  FACILITATOR(S): Veronica Castellano LADC  TOPIC: BEH Group Therapy  Number of patients attending the group:  10  Group Length:  2 Hours    Group Therapy Type: Recovery strategies    Summary of Group / Topics Discussed:    Sober coping skills, Disease of addiction, and Relapse prevention    Group Attendance:  Attended group session    Patient's response to the group topic/interactions:  cooperative with task    Patient appeared to be Actively participating, Attentive, and Engaged.        Client specific details: Pt offered supportive feedback to his peers on their \"Relapse Triggers and Cues,\" \"Coping with Anxiety,\" and \"5 years sober vs. 5 yeas using\" assignments.  "

## 2024-06-24 NOTE — PROGRESS NOTES
[facility] Weekly Treatment Plan Review    Treatment plan review for the following date span:  6/17/24 to 6/23/24    ATTENDANCE  Patient did have any absences during this time period (list absence dates and reason for absence).  Patient attended a medical appointment on 6/20/24      Weekly Treatment Plan Review     Treatment Plan initiated on: 6/17/24.    Dimension1: Acute Intoxication/Withdrawal Potential -   Client Goals Addressed Since last Review: Be able to manage mild to moderate withdrawal symptoms.   Are Treatment Plan goals/methods effective? Yes  Date of Last Use 6/10/24  Any reports of withdrawal symptoms - No      Dimension 2: Biomedical Conditions & Complications -   Client Goals Addressed Since last Review: Follow recommendations of medical provider. Schedule an appointment with a PCP [facility].   Are Treatment Plan goals/methods effective? Yes  Medical Concerns:  None reported  Vitals:   BP Readings from Last 3 Encounters:   06/20/24 (!) 143/87   06/13/24 (!) 155/90   03/18/24 118/72     Pulse Readings from Last 3 Encounters:   06/20/24 70   06/13/24 95   03/18/24 82     Wt Readings from Last 3 Encounters:   06/20/24 100.9 kg (222 lb 8 oz)   06/10/24 102.1 kg (225 lb)   03/18/24 102.7 kg (226 lb 8 oz)     Temp Readings from Last 3 Encounters:   06/20/24 99  F (37.2  C) (Temporal)   06/13/24 97  F (36.1  C)   03/18/24 98.8  F (37.1  C) (Temporal)      Current Medications & Medication Changes:  Current Outpatient Medications   Medication Sig Dispense Refill    disulfiram (ANTABUSE) 250 MG tablet Take 1 tablet (250 mg) by mouth daily (Patient not taking: Reported on 6/20/2024) 30 tablet 1    folic acid (FOLVITE) 1 MG tablet Take 1 tablet (1 mg) by mouth daily 30 tablet 1    hydrALAZINE (APRESOLINE) 25 MG tablet Take 1 tablet (25 mg) by mouth 3 times daily as needed for high blood pressure (SBP > 170) (Patient not taking: Reported on 6/20/2024) 30 tablet 1    hydrOXYzine HCl (ATARAX) 25  MG tablet Take 1 tablet (25 mg) by mouth every 4 hours as needed for anxiety 90 tablet 1    levothyroxine (SYNTHROID/LEVOTHROID) 75 MCG tablet Take 1 tablet (75 mcg) by mouth daily 30 tablet 1    magnesium oxide (MAG-OX) 400 MG tablet Take 1 tablet (400 mg) by mouth daily 30 tablet 1    multivitamin w/minerals (THERA-VIT-M) tablet Take 1 tablet by mouth daily 30 tablet 1    thiamine (B-1) 100 MG tablet Take 1 tablet (100 mg) by mouth daily 30 tablet 1    traZODone (DESYREL) 50 MG tablet Take 1 tablet (50 mg) by mouth at bedtime 30 tablet 1    triamcinolone (ARISTOCORT HP) 0.5 % external cream Apply topically 2 times daily for 7 days.  Apply to rash on arms and legs. 454 g 0    venlafaxine (EFFEXOR XR) 75 MG 24 hr capsule Take 3 capsules (225 mg) by mouth daily (with breakfast) 90 capsule 1     No current facility-administered medications for this encounter.     Facility-Administered Medications Ordered in Other Encounters   Medication Dose Route Frequency Provider Last Rate Last Admin    Self Administer Medications: Behavioral Services   Does not apply See Admin Instructions Lynsey Matt MD         Taking meds as prescribed? Yes  Medication side effects or concerns:  None reported  Outside medical appointments since last review (list provider and reason for visit):  Patient attended a medical appointment on 6/20/24      Dimension 3: Emotional/Behavioral Conditions & Complications -   Client Goals Addressed Since last Review: Understand the relationship between addiction and mental health issues.   Are Treatment Plan goals/methods effective? Yes  Mental health diagnosis: depression, anxiety  PHQ2:       6/24/2024     1:00 PM 2/11/2024    10:00 AM 2/5/2024     8:00 AM 1/29/2024    10:00 AM 1/24/2024     5:00 PM   PHQ-2 ( 1999 [employer])   Q1: Little interest or pleasure in doing things 3 1 1 2 1   Q2: Feeling down, depressed or hopeless 3 1 1 2 1   PHQ-2 Score 6 2 2 4 2      GAD2:       1/24/2024     5:00 PM  "1/29/2024    10:00 AM 2/5/2024     8:00 AM 2/11/2024    10:00 AM 6/24/2024     1:00 PM   CINTHIA-2   Feeling nervous, anxious, or on edge 1 1 1 1 2   Not being able to stop or control worrying 1 1 1 1 2   CINTHIA-2 Total Score 2 2 2 2 4     Date of last SIB:  NA  Date of  last SI:  passive prior to admission  Date of last HI: NA  Behavioral Targets: Stabilize and maintain mental health.  Current MH Assignments:  Anxiety and Recovery from Chemical Dependency    Additional Narrative:  Current Mental Health symptoms include: none identified.  Active interventions to stabilize mental health symptoms this week : group therapy.  Patient reports he has noticed he has been sleeping more this week. He reports his stress level has gone up due to \"Reality\". Patient reports \"none\" as his primary coping skills he uses to manage stress and difficult emotions.  Patient denies any suicidal thoughts or ideations at this time. Patient will continue to be monitored throughout treatment.       Dimension 4: Treatment Acceptance / Resistance -   Client Goals Addressed Since last Review: Understand the impact your substance use has had on your family and significant relationships.   Are Treatment Plan goals/methods effective? Yes  JACKI Diagnosis:  Alcohol Use Disorder   303.90 (F10.20) Severe In a controlled environment  Commitment to tx process/Stage of change- Contemplation  JACKI assignments - \"What sobriety means to me\"      Additional Narrative - Patient verbally reports being motivated for long-term recovery. Patient reports (none reported) as his primary motivation to stay sober and in treatment this week.  Patient's group attendance and participation have been positive overall.    Dimension 5: Relapse / Continued Problem Potential -   Client Goals Addressed Since last Review: Identify and understand personal relapse and self-sabotage patterns.   Are Treatment Plan goals/methods effective? Yes  Relapses this week - None  Urges to use - YES, " "List \"9/10  UA results -   Recent Results (from the past 672 hour(s))   Comprehensive metabolic panel    Collection Time: 06/10/24  1:29 PM   Result Value Ref Range    Sodium 141 135 - 145 mmol/L    Potassium 3.6 3.4 - 5.3 mmol/L    Carbon Dioxide (CO2) 25 22 - 29 mmol/L    Anion Gap 13 7 - 15 mmol/L    Urea Nitrogen 13.9 6.0 - 20.0 mg/dL    Creatinine 0.76 0.67 - 1.17 mg/dL    GFR Estimate >90 >60 mL/min/1.73m2    Calcium 9.6 8.6 - 10.0 mg/dL    Chloride 103 98 - 107 mmol/L    Glucose 106 (H) 70 - 99 mg/dL    Alkaline Phosphatase 69 40 - 150 U/L    AST 30 0 - 45 U/L    ALT 31 0 - 70 U/L    Protein Total 7.0 6.4 - 8.3 g/dL    Albumin 4.3 3.5 - 5.2 g/dL    Bilirubin Total <0.2 <=1.2 mg/dL   Magnesium    Collection Time: 06/10/24  1:29 PM   Result Value Ref Range    Magnesium 1.6 (L) 1.7 - 2.3 mg/dL   Ethyl Alcohol Level    Collection Time: 06/10/24  1:29 PM   Result Value Ref Range    Alcohol ethyl 0.11 (H) <=0.01 g/dL   CBC with platelets and differential    Collection Time: 06/10/24  1:29 PM   Result Value Ref Range    WBC Count 4.3 4.0 - 11.0 10e3/uL    RBC Count 4.49 4.40 - 5.90 10e6/uL    Hemoglobin 14.7 13.3 - 17.7 g/dL    Hematocrit 42.9 40.0 - 53.0 %    MCV 96 78 - 100 fL    MCH 32.7 26.5 - 33.0 pg    MCHC 34.3 31.5 - 36.5 g/dL    RDW 14.2 10.0 - 15.0 %    Platelet Count 179 150 - 450 10e3/uL    % Neutrophils 68 %    % Lymphocytes 16 %    % Monocytes 13 %    % Eosinophils 1 %    % Basophils 1 %    % Immature Granulocytes 1 %    NRBCs per 100 WBC 0 <1 /100    Absolute Neutrophils 3.0 1.6 - 8.3 10e3/uL    Absolute Lymphocytes 0.7 (L) 0.8 - 5.3 10e3/uL    Absolute Monocytes 0.6 0.0 - 1.3 10e3/uL    Absolute Eosinophils 0.0 0.0 - 0.7 10e3/uL    Absolute Basophils 0.0 0.0 - 0.2 10e3/uL    Absolute Immature Granulocytes 0.0 <=0.4 10e3/uL    Absolute NRBCs 0.0 10e3/uL   Urine Drug Screen Panel    Collection Time: 06/10/24  2:25 PM   Result Value Ref Range    Amphetamines Urine Screen Negative Screen Negative    " "Barbituates Urine Screen Negative Screen Negative    Benzodiazepine Urine Screen Negative Screen Negative    Cannabinoids Urine Screen Negative Screen Negative    Cocaine Urine Screen Negative Screen Negative    Fentanyl Qual Urine Screen Negative Screen Negative    Opiates Urine Screen Negative Screen Negative    PCP Urine Screen Negative Screen Negative   Lipid panel    Collection Time: 06/11/24  7:32 AM   Result Value Ref Range    Cholesterol 288 (H) <200 mg/dL    Triglycerides 324 (H) <150 mg/dL    Direct Measure HDL 68 >=40 mg/dL    LDL Cholesterol Calculated 155 (H) <=100 mg/dL    Non HDL Cholesterol 220 (H) <130 mg/dL   GGT    Collection Time: 06/11/24  7:32 AM   Result Value Ref Range    GGT 24 8 - 61 U/L   TSH with free T4 reflex and/or T3 as indicated    Collection Time: 06/11/24  7:32 AM   Result Value Ref Range    TSH 1.68 0.30 - 4.20 uIU/mL   Magnesium    Collection Time: 06/11/24  7:32 AM   Result Value Ref Range    Magnesium 1.6 (L) 1.7 - 2.3 mg/dL   Extra Purple Top Tube    Collection Time: 06/11/24  7:32 AM   Result Value Ref Range    Hold Specimen [facility]    Urine Drug Confirmation Panel    Collection Time: 06/13/24  2:16 PM   Result Value Ref Range    Nordiazepam Present (A) Absent    Oxazepam Present (A) Absent    Temazepam Present (A) Absent   Urine Creatinine for Drug Screen Panel    Collection Time: 06/13/24  2:16 PM   Result Value Ref Range    Creatinine Urine for Drug Screen 65 mg/dL     Identified triggers - \"Reality\"  Coping skills identified - \"none\".  Patient is able to utilize these skills when needed.    Additional Narrative- Patient attended Spirituality Group facilitated by Adwoa Armenta.  He continues to work on developing relapse prevention strategies and sober coping skills.    Dimension 6: Recovery Environment -   Client Goals Addressed Since last Review: Develop a sober support network.   Are Treatment Plan goals/methods effective? Yes  Family Involvement - Patient reports having " "contact with loved ones  Summarize participation in family sessions - NA  Family supportive of program/stages?  Yes      Community support group attendance -  Patient is attending nightly sober support meetings/lectures.  Recreational activities - Patient has been participating in offered program activities and leisure time with peers.      Additional Narrative - Patient has been spending time with same gender-peers and connecting with/building a sober support network. Patient reports his aftercare plan will include \"not sure\".    Progress made on transition planning goals: none    Justification for Continued Treatment at this Level of Care:  Patient continues to work on completing treatment plan goals and interventions.   Patient remains a high risk for relapse at this time and would benefit from continued support in developing relapse prevention strategies.   Patient's mental health symptoms are currently impacting his daily functioning and would benefit from continued support.  Treatment coordination activities since last review:   None  Need for peer recovery support referral? No    Discharge Planning:  Target Discharge Date/Timeframe:  7/11/24  Med Mgmt Provider/Appt:  KEMI  Ind therapy Provider/Appt:  KEMI  Family therapy Provider/Appt:  KEMI          Dimension Scale Review     Prior ratings: Dim1 - 0 DIM2 - 0 DIM3 - 2 DIM4 - 2 DIM5 - 4 DIM6 -4     Current ratings: Dim1 - 0 DIM2 - 0 DIM3 - 2 DIM4 - 2 DIM5 - 4 DIM6 -4     Is patient a vulnerable adult?  Yes - reviewed and evaluated IAPP? Yes        *Client received copy of changes: N/A  *Client is aware of right to access a treatment plan review: Yes  "

## 2024-06-25 ENCOUNTER — HOSPITAL ENCOUNTER (OUTPATIENT)
Dept: BEHAVIORAL HEALTH | Facility: CLINIC | Age: 49
Discharge: HOME | End: 2024-06-25
Attending: FAMILY MEDICINE
Payer: COMMERCIAL

## 2024-06-25 PROCEDURE — H2035 A/D TX PROGRAM, PER HOUR: HCPCS | Mod: HQ | Performed by: COUNSELOR

## 2024-06-25 PROCEDURE — H2035 A/D TX PROGRAM, PER HOUR: HCPCS | Mod: HQ

## 2024-06-25 PROCEDURE — 1002N00001 HC LODGING PLUS FACILITY CHARGE ADULT

## 2024-06-25 NOTE — GROUP NOTE
Group Therapy Documentation    PATIENT'S NAME: Jose Alberto Cuellar  MRN:   [id number]  :   1975  ACCT. NUMBER: [id number]  DATE OF SERVICE: 24  START TIME:  9:00 AM  END TIME: 11:00 AM  FACILITATOR(S): Johann Townsend LADC  TOPIC: BEH Group Therapy  Number of patients attending the group:  10  Group Length:  2 Hours    Group Therapy Type: Recovery strategies    Summary of Group / Topics Discussed:    Recovery Principles, Mindfulness/Relaxation, Coping/DBT informed care, Trauma informed care, Disease of addiction, Emotions/expression, and Relapse prevention      Group Attendance:  Attended group session    Patient's response to the group topic/interactions:  cooperative with task    Patient appeared to be Attentive and Engaged.        Client specific details:  Jose Alberto participated in morning group. He checked in and took part in the reading and discussion on how people wake up in the morning in how they deal with their feelings and emotions. For the rest group he listened to and gave positive feedback on his peer's assignments.

## 2024-06-25 NOTE — GROUP NOTE
Psychoeducation Group Documentation    PATIENT'S NAME: Jose Alberto Cuellar  MRN:   [id number]  :   1975  ACCT. NUMBER: [id number]  DATE OF SERVICE: 24  START TIME:  3:00 PM  END TIME:  4:00 PM  FACILITATOR(S): Johann Townsend LADC; Kimmie Garcia LADC  TOPIC: BEH Pyschoeducation  Number of patients attending the group:  25  Group Length:  1 Hours    Skills Group Therapy Type: Recovery skills and Emotion regulation skills    Summary of Group / Topics Discussed:    DBT skills  Facilitator provided a DBT skills group focusing on coping strategies and emotional regulation to manage acute and/or intense mental health/substance use symptoms.  Participants were provided at least 3 practical skills to assist in emotional regulation as well as description of DBT as a method of therapy and usefulness of the strategies.          Group Attendance:  Attended group session    Patient's response to the group topic/interactions:  cooperative with task and listened actively    Patient appeared to be Actively participating, Attentive, and Engaged.         Client specific details:  Patient actively participated in today's DBT group.  Patient listened attentively..

## 2024-06-25 NOTE — GROUP NOTE
"Group Therapy Documentation    PATIENT'S NAME: Jose Alberto Cuellar  MRN:   [id number]  :   1975  ACCT. NUMBER: [id number]  DATE OF SERVICE: 24  START TIME: 12:30 PM  END TIME:  2:30 PM  FACILITATOR(S): Camilla Escalera LADC; Randi Lord LADC  TOPIC: BEH Group Therapy  Number of patients attending the group:  10  Group Length:  2 Hours    Group Therapy Type: Recovery strategies, Emotion processing, Daily living/independence skills, and Health and wellbeing     Summary of Group / Topics Discussed:    Patients completed daily check ins and the question of the day, \" What are you trying to accept about yourself?\"      Patients discussed preparing for change, identifying barriers, and anticipating triggers.       Group Attendance:  Attended group session    Patient's response to the group topic/interactions:  cooperative with task, discussed personal experience with topic, and gave appropriate feedback to peers    Patient appeared to be Engaged.        Client specific details:  Jose Alberto, participated in group discussion on change and acceptance.    "

## 2024-06-26 ENCOUNTER — HOSPITAL ENCOUNTER (OUTPATIENT)
Dept: BEHAVIORAL HEALTH | Facility: CLINIC | Age: 49
Discharge: HOME | End: 2024-06-26
Attending: FAMILY MEDICINE
Payer: COMMERCIAL

## 2024-06-26 PROCEDURE — H2035 A/D TX PROGRAM, PER HOUR: HCPCS | Mod: HQ

## 2024-06-26 PROCEDURE — 1002N00001 HC LODGING PLUS FACILITY CHARGE ADULT

## 2024-06-26 NOTE — GROUP NOTE
"Group Therapy Documentation    PATIENT'S NAME: Jose Alberto Cuellar  MRN:   [id number]  :   1975  ACCT. NUMBER: [id number]  DATE OF SERVICE: 24  START TIME: 12:30 PM  END TIME:  2:30 PM  FACILITATOR(S): Camilla Escalera LADC; Randi Lord LADC  TOPIC: BEH Group Therapy  Number of patients attending the group:  9  Group Length:  2 Hours    Group Therapy Type: Recovery strategies, Emotion processing, Daily living/independence skills, and Health and wellbeing     Summary of Group / Topics Discussed:    Patients completed daily check ins and the question of the day, \" What or Who holds you back?\"      Patient presented assignments and shared feedback.     Patients discussed preparing for change, identifying barriers, and anticipating triggers.       Patients engaged in daily meditation reading and processed the meditations \"Keep it Simple\".      Group Attendance:  Attended group session    Patient's response to the group topic/interactions:  cooperative with task, discussed personal experience with topic, gave appropriate feedback to peers, and listened actively    Patient appeared to be Actively participating.        Client specific details:  Jose Alberto, participated in his peers presented assignments and gave positive feed back.    "

## 2024-06-26 NOTE — PROGRESS NOTES
Name: Jose Alberto Cuellar  Date: 6/26/2024  Medical Record: [id number]    Envelope Number: [id number]  List of Contents (List each item separately in new row):   Triamcinolone Acetonide 0.5% cream.   Admission:  I am responsible for any personal items that are not sent to the safe or pharmacy.  [facility] is not responsible for loss, theft or damage of any property in my possession.    Patient Signature:  ___________________________________________       Date/Time:__________________________    Staff Signature: __________________________________       Date/Time:__________________________    [facility] Staff person, if patient is unable/unwilling to sign:      __________________________________________________________       Date/Time: __________________________    **All medications are packaged by [facility] staff and securely stored on [employer] plus. Medications left by patients at discharge will be packaged by [facility] staff and transported by [facility] staff to inpatient pharmacy for storage.**    Discharge:  [facility] has returned all of my personal belongings:    Patient Signature: ________________________________________     Date/Time: ____________________________________    Staff Signature: ______________________________________     Date/Time:_____________________________________

## 2024-06-26 NOTE — GROUP NOTE
Group Therapy Documentation    PATIENT'S NAME: Jose Alberto Cuellar  MRN:   [id number]  :   1975  ACCT. NUMBER: [id number]  DATE OF SERVICE: 24  START TIME:  8:30 AM  END TIME:  9:30 AM  FACILITATOR(S): Constantine Malloy LADC  TOPIC: BEH Group Therapy  Number of patients attending the group:  9  Group Length:  1 Hours    Group Therapy Type: Health and wellbeing     Summary of Group / Topics Discussed:    Disease of addiction      Group Attendance:  Attended group session    Patient's response to the group topic/interactions:  cooperative with task    Patient appeared to be Actively participating.        Client specific details:  Jose Alberto participated and interacted appropriately with peers and staff in GI Doc's group. No triggers to use noted or discussed.

## 2024-06-26 NOTE — GROUP NOTE
Group Therapy Documentation    PATIENT'S NAME: Jose Alberto Cuellar  MRN:   [id number]  :   1975  ACCT. NUMBER: [id number]  DATE OF SERVICE: 24  START TIME:  9:45 AM  END TIME: 11:30 AM  FACILITATOR(S): Randi Lord LADC  TOPIC: BEH Group Therapy  Number of patients attending the group:  9  Group Length:  2 Hours    Group Therapy Type: Emotion processing    Summary of Group / Topics Discussed:    Recovery Principles, Disease of addiction, and Emotions/expression      Group Attendance:  Attended group session    Patient's response to the group topic/interactions:  cooperative with task    Patient appeared to be Actively participating, Attentive, and Engaged.        Client specific details: Jose Alberto was an active participant in morning group therapy session. He took part in a discussion on emotional regulation and expression. He also gave feedback to a peer who shared his Use History.

## 2024-06-27 ENCOUNTER — HOSPITAL ENCOUNTER (OUTPATIENT)
Dept: BEHAVIORAL HEALTH | Facility: CLINIC | Age: 49
Discharge: HOME | End: 2024-06-27
Attending: FAMILY MEDICINE
Payer: COMMERCIAL

## 2024-06-27 PROCEDURE — H2035 A/D TX PROGRAM, PER HOUR: HCPCS | Mod: HQ | Performed by: COUNSELOR

## 2024-06-27 PROCEDURE — H2035 A/D TX PROGRAM, PER HOUR: HCPCS | Mod: HQ

## 2024-06-27 PROCEDURE — 1002N00001 HC LODGING PLUS FACILITY CHARGE ADULT

## 2024-06-27 NOTE — GROUP NOTE
Group Therapy Documentation    PATIENT'S NAME: Jose Alberto Cuellar  MRN:   [id number]  :   1975  ACCT. NUMBER: [id number]  DATE OF SERVICE: 24  START TIME: 12:30 PM  END TIME:  2:30 PM  FACILITATOR(S): Veronica Castellano LADC  TOPIC: BEH Group Therapy  Number of patients attending the group:  9  Group Length:  2 Hours    Group Therapy Type: Health and wellbeing     Summary of Group / Topics Discussed:    Spiritual Care    Group Attendance:  Attended group session    Patient's response to the group topic/interactions:  cooperative with task    Patient appeared to be Actively participating, Attentive, and Engaged.        Client specific details: Pt took part in Spiritual Care group, facilitated by [facility].

## 2024-06-27 NOTE — GROUP NOTE
Psychoeducation Group Documentation    PATIENT'S NAME: Jose Alberto Cuellar  MRN:   [id number]  :   1975  ACCT. NUMBER: [id number]  DATE OF SERVICE: 24  START TIME:  3:00 PM  END TIME:  4:00 PM  FACILITATOR(S): Kimmie Garcia LADC; Orlando Dueñas MD  TOPIC: BEH Pyschoeducation  Number of patients attending the group:  26  Group Length:  1 Hours    Skills Group Therapy Type: Healthy behaviors development    Summary of Group / Topics Discussed:    Balanced lifestyle skills    Doctoral fellow presented a topic based on evolution and it's relationship to addiction.  Patients listened attentively throughout the lecture.        Group Attendance:  Attended group session    Patient's response to the group topic/interactions:  cooperative with task and listened actively    Patient appeared to be Actively participating, Attentive, and Engaged.         Client specific details:  Patient listened attentively throughout lecture.  Lecturer provided information and education to provide additional insights and perspectives on the relationship between evolution and addiction..

## 2024-06-27 NOTE — GROUP NOTE
"Group Therapy Documentation    PATIENT'S NAME: Jose Alberto Cuellar  MRN:   [id number]  :   1975  ACCT. NUMBER: [id number]  DATE OF SERVICE: 24  START TIME:  9:00 AM  END TIME: 11:00 AM  FACILITATOR(S): Veronica Castellano LADC  TOPIC: BEH Group Therapy  Number of patients attending the group:  9  Group Length:  2 Hours    Group Therapy Type: Recovery strategies    Summary of Group / Topics Discussed:    Recovery Principles, Disease of addiction, and Relapse prevention    Group Attendance:  Attended group session    Patient's response to the group topic/interactions:  cooperative with task    Patient appeared to be Actively participating, Attentive, and Engaged.        Client specific details: Pt reported feeling depressed and shared that he's grateful for coffee and cigarettes. He offered supportive feedback to his peer on his \"First Step\" assignment.  "

## 2024-06-28 ENCOUNTER — HOSPITAL ENCOUNTER (OUTPATIENT)
Dept: BEHAVIORAL HEALTH | Facility: CLINIC | Age: 49
Discharge: HOME | End: 2024-06-28
Attending: FAMILY MEDICINE
Payer: COMMERCIAL

## 2024-06-28 PROCEDURE — H2035 A/D TX PROGRAM, PER HOUR: HCPCS | Mod: HQ

## 2024-06-28 PROCEDURE — 1002N00001 HC LODGING PLUS FACILITY CHARGE ADULT

## 2024-06-28 NOTE — GROUP NOTE
Group Therapy Documentation    PATIENT'S NAME: Jose Alberto Cuellar  MRN:   [id number]  :   1975  ACCT. NUMBER: [id number]  DATE OF SERVICE: 24  START TIME:  9:00 AM  END TIME: 11:00 AM  FACILITATOR(S): Randi Lord LADC  TOPIC: BEH Group Therapy  Number of patients attending the group:  8  Group Length:  2 Hours    Group Therapy Type: Emotion processing    Summary of Group / Topics Discussed:    Sober coping skills, Disease of addiction, and Emotions/expression      Group Attendance:  Attended group session    Patient's response to the group topic/interactions:  cooperative with task    Patient appeared to be Actively participating, Attentive, and Engaged.        Client specific details: Jose Alberto was an active participant in morning group therapy session. He engaged in a discussion on the disease of addiction as well as a peer graduation ceremony.

## 2024-06-28 NOTE — GROUP NOTE
Group Therapy Documentation    PATIENT'S NAME: Jose Alberto Cuellar  MRN:   [id number]  :   1975  ACCT. NUMBER: [id number]  DATE OF SERVICE: 24  START TIME: 12:30 PM  END TIME:  2:30 PM  FACILITATOR(S): Pebbles Herrera LADC  TOPIC: BEH Group Therapy  Number of patients attending the group:  8    Group Length:  2 Hours    Group Therapy Type: Recovery strategies, Emotion processing, and Daily living/independence skills    Summary of Group / Topics Discussed:    Recovery Principles, Sober coping skills, and Relationship/socialization      Group Attendance:  Attended group session    Patient's response to the group topic/interactions:  cooperative with task    Patient appeared to be Actively participating, Attentive, and Engaged.        Client specific details:  Patient attended recovery movie session and was attentive and participative.

## 2024-06-29 ENCOUNTER — HOSPITAL ENCOUNTER (OUTPATIENT)
Dept: BEHAVIORAL HEALTH | Facility: CLINIC | Age: 49
Discharge: HOME | End: 2024-06-29
Attending: FAMILY MEDICINE
Payer: COMMERCIAL

## 2024-06-29 PROCEDURE — 1002N00001 HC LODGING PLUS FACILITY CHARGE ADULT

## 2024-06-29 PROCEDURE — H2035 A/D TX PROGRAM, PER HOUR: HCPCS | Mod: HQ

## 2024-06-29 NOTE — GROUP NOTE
Group Therapy Documentation    PATIENT'S NAME: Jose Alberto Cuellar  MRN:   [id number]  :   1975  ACCT. NUMBER: [id number]  DATE OF SERVICE: 24  START TIME: 12:30 AM  END TIME:  2:30 PM  FACILITATOR(S): Tammy Leroy LADC; Veronica Castellano LADC  TOPIC: BEH Group Therapy  Number of patients attending the group:    Group Length:  2 Hours    Group Therapy Type: Recovery strategies    Summary of Group / Topics Discussed:    Recovery Principles, Relationship/socialization, Emotions/expression, and Relapse prevention    Facilitator presented 2 hour lecture addressing Relapse Prevention -Focusing on Relapse Warning Signs and Communication in Recovery      Group Attendance:  Attended group session    Patient's response to the group topic/interactions:  cooperative with task    Patient appeared to be Actively participating.        Client specific details:  Jose Alberto attended weekend  lecture on relapse prevention, remained engaged and participated in discussion. No further concerns reported  .

## 2024-06-29 NOTE — GROUP NOTE
Group Therapy Documentation    PATIENT'S NAME: Jose Alberto Cuellar  MRN:   [id number]  :   1975  ACCT. NUMBER: [id number]  DATE OF SERVICE: 24  START TIME:  9:00 AM  END TIME: 11:00 AM  FACILITATOR(S): Veronica Castellano LADC; Tammy Leroy LADC  TOPIC: BEH Group Therapy  Number of patients attending the group:  27  Group Length:  2 Hours    Group Therapy Type: Recovery strategies    Summary of Group / Topics Discussed:    Relapse prevention    Group Attendance:  Attended group session    Patient's response to the group topic/interactions:  cooperative with task    Patient appeared to be Attentive and Engaged.        Client specific details: Pt listened respectfully to a presentation on relapse prevention and asked appropriate questions.

## 2024-06-30 ENCOUNTER — HOSPITAL ENCOUNTER (OUTPATIENT)
Dept: BEHAVIORAL HEALTH | Facility: CLINIC | Age: 49
Discharge: HOME | End: 2024-06-30
Attending: FAMILY MEDICINE
Payer: COMMERCIAL

## 2024-06-30 PROCEDURE — 1002N00001 HC LODGING PLUS FACILITY CHARGE ADULT

## 2024-06-30 PROCEDURE — H2035 A/D TX PROGRAM, PER HOUR: HCPCS | Mod: HQ

## 2024-06-30 NOTE — GROUP NOTE
Group Therapy Documentation    PATIENT'S NAME: Jose Alberto Cuellar  MRN:   [id number]  :   1975  ACCT. NUMBER: [id number]  DATE OF SERVICE: 24  START TIME: 12:30 PM  END TIME:  1:30 PM  FACILITATOR(S): Veronica Castellano LADC; Reggie Cowan LADC  TOPIC: BEH Group Therapy  Number of patients attending the group:  27  Group Length:  1 Hours    Group Therapy Type: Recovery strategies    Summary of Group / Topics Discussed:    Balanced lifestyle and Relapse prevention    Group Attendance:  Attended group session    Patient's response to the group topic/interactions:  cooperative with task    Patient appeared to be Attentive and Engaged.        Client specific details: Pt listened respectfully to a presentation on sexual boundaries and asked appropriate questions.

## 2024-06-30 NOTE — GROUP NOTE
Group Therapy Documentation    PATIENT'S NAME: Jose Alberto Cuellar  MRN:   [id number]  :   1975  ACCT. NUMBER: [id number]  DATE OF SERVICE: 24  START TIME:  9:00 AM  END TIME: 11:00 AM  FACILITATOR(S): Veronica Castellano LADC; Reggie Cowan LADC  TOPIC: BEH Group Therapy  Number of patients attending the group:  27  Group Length:  2 Hours    Group Therapy Type: Health and wellbeing     Summary of Group / Topics Discussed:    Balanced lifestyle    Group Attendance:  Attended group session    Patient's response to the group topic/interactions:  cooperative with task    Patient appeared to be Attentive and Engaged.        Client specific details:  Pt listened respectfully to a presentation on STIs & TB, asked appropriate questions, and took part in the related activity.

## 2024-07-01 ENCOUNTER — HOSPITAL ENCOUNTER (OUTPATIENT)
Dept: BEHAVIORAL HEALTH | Facility: CLINIC | Age: 49
Discharge: HOME | End: 2024-07-01
Attending: FAMILY MEDICINE
Payer: COMMERCIAL

## 2024-07-01 PROCEDURE — H2035 A/D TX PROGRAM, PER HOUR: HCPCS | Mod: HQ

## 2024-07-01 PROCEDURE — 1002N00001 HC LODGING PLUS FACILITY CHARGE ADULT

## 2024-07-01 ASSESSMENT — ANXIETY QUESTIONNAIRES
2. NOT BEING ABLE TO STOP OR CONTROL WORRYING: SEVERAL DAYS
1. FEELING NERVOUS, ANXIOUS, OR ON EDGE: SEVERAL DAYS

## 2024-07-01 NOTE — GROUP NOTE
Group Therapy Documentation    PATIENT'S NAME: Jose Alberto Cuellar  MRN:   [id number]  :   1975  ACCT. NUMBER: [id number]  DATE OF SERVICE: 24  START TIME:  9:00 AM  END TIME: 11:00 AM  FACILITATOR(S): Randi Lord LADC  TOPIC: BEH Group Therapy  Number of patients attending the group:  7  Group Length:  2 Hours    Group Therapy Type: Emotion processing    Summary of Group / Topics Discussed:    Sober coping skills, Disease of addiction, and Emotions/expression      Group Attendance:  Attended group session    Patient's response to the group topic/interactions:  cooperative with task    Patient appeared to be Actively participating, Attentive, and Engaged.        Client specific details: Jose Alberto was an active participant in morning group therapy session. He engaged in a goal-setting exercise and a discussion on gratitude.

## 2024-07-01 NOTE — PROGRESS NOTES
[facility] Weekly Treatment Plan Review    Treatment plan review for the following date span:  6/23/24 to 7/1/24    ATTENDANCE  Patient did have any absences during this time period (list absence dates and reason for absence).  Patient attended a medical appointment on 6/20/24      Weekly Treatment Plan Review     Treatment Plan initiated on: 6/17/24.    Dimension1: Acute Intoxication/Withdrawal Potential -   Client Goals Addressed Since last Review: Be able to manage mild to moderate withdrawal symptoms.   Are Treatment Plan goals/methods effective? Yes  Date of Last Use 6/10/24  Any reports of withdrawal symptoms - No      Dimension 2: Biomedical Conditions & Complications -   Client Goals Addressed Since last Review: Follow recommendations of medical provider. Schedule an appointment with a PCP [facility] in [facility].   Are Treatment Plan goals/methods effective? Yes  Medical Concerns:  None reported  Vitals:   BP Readings from Last 3 Encounters:   06/20/24 (!) 143/87   06/13/24 (!) 155/90   03/18/24 118/72     Pulse Readings from Last 3 Encounters:   06/20/24 70   06/13/24 95   03/18/24 82     Wt Readings from Last 3 Encounters:   06/20/24 100.9 kg (222 lb 8 oz)   06/10/24 102.1 kg (225 lb)   03/18/24 102.7 kg (226 lb 8 oz)     Temp Readings from Last 3 Encounters:   06/20/24 99  F (37.2  C) (Temporal)   06/13/24 97  F (36.1  C)   03/18/24 98.8  F (37.1  C) (Temporal)      Current Medications & Medication Changes:  Current Outpatient Medications   Medication Sig Dispense Refill    disulfiram (ANTABUSE) 250 MG tablet Take 1 tablet (250 mg) by mouth daily (Patient not taking: Reported on 6/20/2024) 30 tablet 1    folic acid (FOLVITE) 1 MG tablet Take 1 tablet (1 mg) by mouth daily 30 tablet 1    hydrALAZINE (APRESOLINE) 25 MG tablet Take 1 tablet (25 mg) by mouth 3 times daily as needed for high blood pressure (SBP > 170) (Patient not taking: Reported on 6/20/2024) 30 tablet 1    hydrOXYzine HCl (ATARAX) 25  MG tablet Take 1 tablet (25 mg) by mouth every 4 hours as needed for anxiety 90 tablet 1    levothyroxine (SYNTHROID/LEVOTHROID) 75 MCG tablet Take 1 tablet (75 mcg) by mouth daily 30 tablet 1    magnesium oxide (MAG-OX) 400 MG tablet Take 1 tablet (400 mg) by mouth daily 30 tablet 1    multivitamin w/minerals (THERA-VIT-M) tablet Take 1 tablet by mouth daily 30 tablet 1    thiamine (B-1) 100 MG tablet Take 1 tablet (100 mg) by mouth daily 30 tablet 1    traZODone (DESYREL) 50 MG tablet Take 1 tablet (50 mg) by mouth at bedtime 30 tablet 1    triamcinolone (ARISTOCORT HP) 0.5 % external cream Apply topically 2 times daily for 7 days.  Apply to rash on arms and legs. 454 g 0    venlafaxine (EFFEXOR XR) 75 MG 24 hr capsule Take 3 capsules (225 mg) by mouth daily (with breakfast) 90 capsule 1     No current facility-administered medications for this encounter.     Facility-Administered Medications Ordered in Other Encounters   Medication Dose Route Frequency Provider Last Rate Last Admin    Self Administer Medications: Behavioral Services   Does not apply See Admin Instructions Lynsey Matt MD         Taking meds as prescribed? Yes  Medication side effects or concerns:  None reported  Outside medical appointments since last review (list provider and reason for visit):  Patient attended a medical appointment on 6/20/24      Dimension 3: Emotional/Behavioral Conditions & Complications -   Client Goals Addressed Since last Review: Understand the relationship between addiction and mental health issues.   Are Treatment Plan goals/methods effective? Yes  Mental health diagnosis: depression, anxiety  PHQ2:       6/29/2024    10:00 AM 6/24/2024     1:00 PM 2/11/2024    10:00 AM 2/5/2024     8:00 AM 1/29/2024    10:00 AM 1/24/2024     5:00 PM   PHQ-2 ( 1999 [employer])   Q1: Little interest or pleasure in doing things 2 3 1 1 2 1   Q2: Feeling down, depressed or hopeless 2 3 1 1 2 1   PHQ-2 Score 4 6 2 2 4 2      GAD2:        "1/24/2024     5:00 PM 1/29/2024    10:00 AM 2/5/2024     8:00 AM 2/11/2024    10:00 AM 6/24/2024     1:00 PM 6/29/2024    10:00 AM   CINTHIA-2   Feeling nervous, anxious, or on edge 1 1 1 1 2 1   Not being able to stop or control worrying 1 1 1 1 2 1   CINTHIA-2 Total Score 2 2 2 2 4 2     Date of last SIB:  NA  Date of  last SI:  passive prior to admission  Date of last HI: NA  Behavioral Targets: Stabilize and maintain mental health.    Current  Assignments:  Anxiety and Recovery from Chemical Dependency,     Additional Narrative:  Patient is continuing  to work on gaining insight regarding how his substance abuse negatively affects his mental and emotional ell being. Patient reports no significant changes in his mood this week. Patient reports that he's managed stressful situations by working out, talking with peers and sleeping. Patient completed the above noted assignment and presented in group. Patient will be working on his  his \"Understanding Depression and Addiction\" assignment and will present in group upon completion.Patient reports no suicidal ideation at this time      Dimension 4: Treatment Acceptance / Resistance -   Client Goals Addressed Since last Review: Understand the impact your substance use has had on your family and significant relationships.   Are Treatment Plan goals/methods effective? Yes  JACKI Diagnosis:  Alcohol Use Disorder   303.90 (F10.20) Severe In a controlled environment  Commitment to tx process/Stage of change- Contemplation    JACKI assignments - \"Drug Use History\"      Additional Narrative - Patient is continuing to work on his internal motivation for change. Patient has attended meetings, lectures and groups on time and offers thoughtful and meaningful feedback regarding home work assignments and topics for group discussion. Patient reports that he's motivated by wanting good health, meaningful relationships and a fulfilling life. Patient completed his \"Drug Use History\" assignment and " "presented in group.      Dimension 5: Relapse / Continued Problem Potential -   Client Goals Addressed Since last Review: Identify and understand personal relapse and self-sabotage patterns.   Are Treatment Plan goals/methods effective? Yes  Relapses this week - None  Urges to use - YES, List \"9/10  UA results -   Recent Results (from the past 672 hour(s))   Comprehensive metabolic panel    Collection Time: 06/10/24  1:29 PM   Result Value Ref Range    Sodium 141 135 - 145 mmol/L    Potassium 3.6 3.4 - 5.3 mmol/L    Carbon Dioxide (CO2) 25 22 - 29 mmol/L    Anion Gap 13 7 - 15 mmol/L    Urea Nitrogen 13.9 6.0 - 20.0 mg/dL    Creatinine 0.76 0.67 - 1.17 mg/dL    GFR Estimate >90 >60 mL/min/1.73m2    Calcium 9.6 8.6 - 10.0 mg/dL    Chloride 103 98 - 107 mmol/L    Glucose 106 (H) 70 - 99 mg/dL    Alkaline Phosphatase 69 40 - 150 U/L    AST 30 0 - 45 U/L    ALT 31 0 - 70 U/L    Protein Total 7.0 6.4 - 8.3 g/dL    Albumin 4.3 3.5 - 5.2 g/dL    Bilirubin Total <0.2 <=1.2 mg/dL   Magnesium    Collection Time: 06/10/24  1:29 PM   Result Value Ref Range    Magnesium 1.6 (L) 1.7 - 2.3 mg/dL   Ethyl Alcohol Level    Collection Time: 06/10/24  1:29 PM   Result Value Ref Range    Alcohol ethyl 0.11 (H) <=0.01 g/dL   CBC with platelets and differential    Collection Time: 06/10/24  1:29 PM   Result Value Ref Range    WBC Count 4.3 4.0 - 11.0 10e3/uL    RBC Count 4.49 4.40 - 5.90 10e6/uL    Hemoglobin 14.7 13.3 - 17.7 g/dL    Hematocrit 42.9 40.0 - 53.0 %    MCV 96 78 - 100 fL    MCH 32.7 26.5 - 33.0 pg    MCHC 34.3 31.5 - 36.5 g/dL    RDW 14.2 10.0 - 15.0 %    Platelet Count 179 150 - 450 10e3/uL    % Neutrophils 68 %    % Lymphocytes 16 %    % Monocytes 13 %    % Eosinophils 1 %    % Basophils 1 %    % Immature Granulocytes 1 %    NRBCs per 100 WBC 0 <1 /100    Absolute Neutrophils 3.0 1.6 - 8.3 10e3/uL    Absolute Lymphocytes 0.7 (L) 0.8 - 5.3 10e3/uL    Absolute Monocytes 0.6 0.0 - 1.3 10e3/uL    Absolute Eosinophils 0.0 0.0 - " "0.7 10e3/uL    Absolute Basophils 0.0 0.0 - 0.2 10e3/uL    Absolute Immature Granulocytes 0.0 <=0.4 10e3/uL    Absolute NRBCs 0.0 10e3/uL   Urine Drug Screen Panel    Collection Time: 06/10/24  2:25 PM   Result Value Ref Range    Amphetamines Urine Screen Negative Screen Negative    Barbituates Urine Screen Negative Screen Negative    Benzodiazepine Urine Screen Negative Screen Negative    Cannabinoids Urine Screen Negative Screen Negative    Cocaine Urine Screen Negative Screen Negative    Fentanyl Qual Urine Screen Negative Screen Negative    Opiates Urine Screen Negative Screen Negative    PCP Urine Screen Negative Screen Negative   Lipid panel    Collection Time: 06/11/24  7:32 AM   Result Value Ref Range    Cholesterol 288 (H) <200 mg/dL    Triglycerides 324 (H) <150 mg/dL    Direct Measure HDL 68 >=40 mg/dL    LDL Cholesterol Calculated 155 (H) <=100 mg/dL    Non HDL Cholesterol 220 (H) <130 mg/dL   GGT    Collection Time: 06/11/24  7:32 AM   Result Value Ref Range    GGT 24 8 - 61 U/L   TSH with free T4 reflex and/or T3 as indicated    Collection Time: 06/11/24  7:32 AM   Result Value Ref Range    TSH 1.68 0.30 - 4.20 uIU/mL   Magnesium    Collection Time: 06/11/24  7:32 AM   Result Value Ref Range    Magnesium 1.6 (L) 1.7 - 2.3 mg/dL   Extra Purple Top Tube    Collection Time: 06/11/24  7:32 AM   Result Value Ref Range    Hold Specimen [facility]    Urine Drug Confirmation Panel    Collection Time: 06/13/24  2:16 PM   Result Value Ref Range    Nordiazepam Present (A) Absent    Oxazepam Present (A) Absent    Temazepam Present (A) Absent   Urine Creatinine for Drug Screen Panel    Collection Time: 06/13/24  2:16 PM   Result Value Ref Range    Creatinine Urine for Drug Screen 65 mg/dL     Identified triggers - \"Reality\"  Coping skills identified - \"none\".  Patient is able to utilize these skills when needed.    Additional Narrative- Patient is continuing to work on gaining awareness and insight regarding his triggers, " "cues and early warning signs for relapse. Patient rates his cravings this week as a 7, 1-(low)-10-(high). Patient reports that he's managed his cravings by working out. Patient completed his \"Guilt and Shame\" assignment and presented in group.Patient attended Spirituality Group facilitated by Adwoa Armenta.  Patient also attended a \"Relationship Workshop\" and a \"Self Care\" lecture this past weekend and completed all activities.      Dimension 6: Recovery Environment -   Client Goals Addressed Since last Review: Develop a sober support network.   Are Treatment Plan goals/methods effective? Yes  Family Involvement - Patient reports having contact with loved ones  Summarize participation in family sessions - NA  Family supportive of program/stages?  Yes      Community support group attendance -  Patient is attending nightly sober support meetings/lectures.  Recreational activities - Patient has been participating in offered program activities and leisure time with peers.      Additional Narrative -Patient reports attending all 12 Step meetings this past week in compliance with his treatment plan. Patient has been working on developing his sober support network by spending free time with same gender peers. Patient will be working with counselors and support staff to develop an aftercare treatment protocol. Patient reports that he'd like his aftercare treatment program to include outpatient programing and AA meetings.      Progress made on transition planning goals:N/A    Justification for Continued Treatment at this Level of Care:  Patient continues to work on completing treatment plan goals and interventions.   Patient remains a high risk for relapse at this time and would benefit from continued support in developing relapse prevention strategies.   Patient's mental health symptoms are currently impacting his daily functioning and would benefit from continued support.  Treatment coordination activities since last " review:   None  Need for peer recovery support referral? No    Discharge Planning:  Target Discharge Date/Timeframe:  7/11/24  Med Mgmt Provider/Appt:  KEMI  Ind therapy Provider/Appt:  KEMI  Family therapy Provider/Appt:  KEMI      Dimension Scale Review     Prior ratings: Dim1 - 0 DIM2 - 0 DIM3 - 2 DIM4 - 2 DIM5 - 4 DIM6 -4     Current ratings: Dim1 - 0 DIM2 - 0 DIM3 - 2 DIM4 - 2 DIM5 - 4 DIM6 -4     Is patient a vulnerable adult?  Yes - reviewed and evaluated IAPP? Yes        *Client received copy of changes: N/A  *Client is aware of right to access a treatment plan review: Yes    ROWAN Solis

## 2024-07-01 NOTE — GROUP NOTE
Group Therapy Documentation    PATIENT'S NAME: Jose Alberto Cuellar  MRN:   [id number]  :   1975  ACCT. NUMBER: [id number]  DATE OF SERVICE: 24  START TIME: 12:30 PM  END TIME:  2:30 PM  FACILITATOR(S): Pebbles Herrera LADC  TOPIC: BEH Group Therapy  Number of patients attending the group:  8    Group Length:  2 Hours    Group Therapy Type: Recovery strategies, Emotion processing, and Daily living/independence skills    Summary of Group / Topics Discussed:    Spiritual Care, Sober coping skills, Relationship/socialization, and Relapse prevention      Group Attendance:  Attended group session    Patient's response to the group topic/interactions:  cooperative with task    Patient appeared to be Actively participating, Attentive, and Engaged.        Client specific details:  Patient attended group session and was attentive and participative.

## 2024-07-02 ENCOUNTER — HOSPITAL ENCOUNTER (OUTPATIENT)
Dept: BEHAVIORAL HEALTH | Facility: CLINIC | Age: 49
Discharge: HOME | End: 2024-07-02
Attending: FAMILY MEDICINE
Payer: COMMERCIAL

## 2024-07-02 ENCOUNTER — TELEPHONE (OUTPATIENT)
Dept: FAMILY MEDICINE | Facility: CLINIC | Age: 49
End: 2024-07-02

## 2024-07-02 ENCOUNTER — OFFICE VISIT (OUTPATIENT)
Dept: FAMILY MEDICINE | Facility: CLINIC | Age: 49
End: 2024-07-02
Payer: COMMERCIAL

## 2024-07-02 VITALS
TEMPERATURE: 98.2 F | HEART RATE: 59 BPM | OXYGEN SATURATION: 99 % | SYSTOLIC BLOOD PRESSURE: 110 MMHG | DIASTOLIC BLOOD PRESSURE: 58 MMHG | RESPIRATION RATE: 20 BRPM | HEIGHT: 69 IN | WEIGHT: 225 LBS | BODY MASS INDEX: 33.33 KG/M2

## 2024-07-02 DIAGNOSIS — R21 RASH AND NONSPECIFIC SKIN ERUPTION: Primary | ICD-10-CM

## 2024-07-02 DIAGNOSIS — F10.91 ALCOHOL USE DISORDER IN REMISSION: ICD-10-CM

## 2024-07-02 DIAGNOSIS — F33.1 MODERATE EPISODE OF RECURRENT MAJOR DEPRESSIVE DISORDER (H): ICD-10-CM

## 2024-07-02 DIAGNOSIS — L20.81 ATOPIC NEURODERMATITIS: ICD-10-CM

## 2024-07-02 PROCEDURE — H2035 A/D TX PROGRAM, PER HOUR: HCPCS | Mod: HQ

## 2024-07-02 PROCEDURE — 1002N00001 HC LODGING PLUS FACILITY CHARGE ADULT

## 2024-07-02 PROCEDURE — 99213 OFFICE O/P EST LOW 20 MIN: CPT | Performed by: NURSE PRACTITIONER

## 2024-07-02 RX ORDER — CETIRIZINE HYDROCHLORIDE 10 MG/1
10 TABLET ORAL DAILY
Qty: 90 TABLET | Refills: 3 | Status: SHIPPED | OUTPATIENT
Start: 2024-07-02

## 2024-07-02 RX ORDER — PREDNISONE 20 MG/1
40 TABLET ORAL DAILY
Qty: 10 TABLET | Refills: 0 | Status: SHIPPED | OUTPATIENT
Start: 2024-07-02 | End: 2024-07-07

## 2024-07-02 ASSESSMENT — PAIN SCALES - GENERAL: PAINLEVEL: NO PAIN (0)

## 2024-07-02 NOTE — GROUP NOTE
Group Therapy Documentation    PATIENT'S NAME: Jose Alberto Cuellar  MRN:   [id number]  :   1975  ACCT. NUMBER: [id number]  DATE OF SERVICE: 24  START TIME:  9:00 AM  END TIME: 11:00 AM  FACILITATOR(S): Randi Lord LADC; Adwoa Armenta  TOPIC: BEH Group Therapy  Number of patients attending the group:  9  Group Length:  2 Hours    Group Therapy Type: Emotion processing    Summary of Group / Topics Discussed:    Spiritual Care      Group Attendance:  Attended group session    Patient's response to the group topic/interactions:  cooperative with task    Patient appeared to be Actively participating, Attentive, and Engaged.        Client specific details: Jose Alberto was an active participant in spiritual health group session led by Adwoa Armenta.

## 2024-07-02 NOTE — PROGRESS NOTES
ICD-10-CM    1. Rash and nonspecific skin eruption  R21 predniSONE (DELTASONE) 20 MG tablet     cetirizine (ZYRTEC) 10 MG tablet     Adult Dermatology  Referral     Adult Allergy/Asthma  Referral      2. Atopic neurodermatitis  L20.81       3. Alcohol use disorder in remission  F10.91       4. Moderate episode of recurrent major depressive disorder (H)  F33.1         See patient instructions  Mood stable- currently in treatment at [facility]; plans to stay in the [facility] following in-patient for supported after care    Manisha Abrams is a 49 year old, presenting for the following health issues:  Derm Problem        7/2/2024    11:25 AM   Additional Questions   Roomed by Elisha REYNAGA     History of Present Illness       Reason for visit:  Dermatology  Symptom onset:  More than a month  Symptoms include:  Rashes blisters pimples boils skin weeping  Symptom intensity:  Moderate  Symptom progression:  Staying the same  Had these symptoms before:  Yes  Has tried/received treatment for these symptoms:  Yes  Previous treatment was successful:  No  What makes it worse:  [place]ar  What makes it better:  [place]    He eats 2-3 servings of fruits and vegetables daily.He consumes 0 sweetened beverage(s) daily.He exercises with enough effort to increase his heart rate 30 to 60 minutes per day.  He exercises with enough effort to increase his heart rate 5 days per week.   He is taking medications regularly.     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 Steroids seem to make it better. He occasionally has itching; no pain. Sometimes heavy exercise makes it better. Has had a rash waxing/waning for several years. Had a referral to dermatology, but was not able to schedule for several months.      Mood stable. He has increased anxiety when he takes prednisone, but it is manageable.          Review of Systems  Constitutional, HEENT, cardiovascular, pulmonary, gi and gu  "systems are negative, except as otherwise noted.      Objective    /58 (BP Location: Left arm, Patient Position: Sitting, Cuff Size: Adult Large)   Pulse 59   Temp 98.2  F (36.8  C) (Temporal)   Resp 20   Ht 1.753 m (5' 9\")   Wt 102.1 kg (225 lb)   SpO2 99%   BMI 33.23 kg/m    Body mass index is 33.23 kg/m .  Physical Exam   GENERAL: alert and no distress  RESP: lungs clear to auscultation - no rales, rhonchi or wheezes  CV: regular rate and rhythm, normal S1 S2, no S3 or S4, no murmur, click or rub, no peripheral edema  SKIN: no suspicious lesions or rashes and scattered lesions throughout; excoriated blisters on both lower legs          Signed Electronically by: CAROLYN Ivey CNP    "

## 2024-07-02 NOTE — TELEPHONE ENCOUNTER
[facility] calling to have pt be seen in clinic today. Assisted staff in scheduling an appointment. Thanks    Talya Alcaraz RN  [facility]

## 2024-07-02 NOTE — TELEPHONE ENCOUNTER
Kuldeep Wilcox, thank you for assessing pt today,     Below appts made on pt's behalf per your referral/recommendation:    R21 (ICD-10-CM) - Rash and nonspecific skin eruption     [facility]  [address], [address]  [place]  [address]  [phone]  Appt Date and Time:  July 17, 2024 at 1:40pm  Provider: Lawanda Mcwilliams NP  Be off of Allergy medication 7 days prior  Be in the clinic 20 minutes early    [facility]  [address] , [address]  [place]  [address]  P - [phone]  F - [phone]  Appt Date and Time:  August 2, 2024 at 1:45pm  Provider: Dr Esteban Fink    Pt agrees with the above and has appt reminder    Sonali Lo    [facility]  Nurse Liaison / CD [facility] ([facility])  [address]  O:[phone]  F:[phone]  RN [place] [id number]  LP After hours(UC):[phone]  LP admin counselor:[phone]  CD assess:[phone]

## 2024-07-02 NOTE — GROUP NOTE
Group Therapy Documentation    PATIENT'S NAME: Jose Alberto Cuellar  MRN:   [id number]  :   1975  ACCT. NUMBER: [id number]  DATE OF SERVICE: 24  START TIME: 12:30 PM  END TIME:  2:30 PM  FACILITATOR(S): Pebbles Herrera LADC  TOPIC: BEH Group Therapy  Number of patients attending the group:  9    Group Length:  2 Hours    Group Therapy Type: Recovery strategies, Emotion processing, and Daily living/independence skills    Summary of Group / Topics Discussed:    Recovery Principles, Sober coping skills, Relationship/socialization, and Relapse prevention      Group Attendance:  Attended group session    Patient's response to the group topic/interactions:  cooperative with task    Patient appeared to be Actively participating, Attentive, and Engaged.        Client specific details: Patient attended group session and was attentive and participative.

## 2024-07-02 NOTE — GROUP NOTE
Group Therapy Documentation    PATIENT'S NAME: Jose Alberto Cuellar  MRN:   [id number]  :   1975  ACCT. NUMBER: [id number]  DATE OF SERVICE: 24  START TIME:  3:00 PM  END TIME:  4:00 PM  FACILITATOR(S): Randi Lord LADC; Pebbles Herrera LADC  TOPIC: BEH Group Therapy  Number of patients attending the group:  27  Group Length:  1 Hours    Group Therapy Type: Recovery strategies    Summary of Group / Topics Discussed:    Research Principles in Addiction Treatment      Group Attendance:  Attended group session    Patient's response to the group topic/interactions:  cooperative with task    Patient appeared to be Attentive and Engaged.        Client specific details: Jose Alberto listened respectfully to Dr. Luque's presentation on research methods in addiction treatment.

## 2024-07-02 NOTE — TELEPHONE ENCOUNTER
Pt displaying intermittent bleeding rash on both upper and lower extremities.  Describes it as burning and extreme itching.     Pt was seen by Alisa on 6/20 and referral for dermatology was placed.  Writer tried to make an derm appt and they are booked out in all locations until after February of 2025.    The  informed writer that should a more urgent referral (not routine) be placed, they would be able to get him seen sooner    You will see this pt at 11:30AM today    Please advise    Thankalemu    Celi Dumont RN    [facility]  Nurse Liaison / CD [facility]  [address]  O:[phone]  F:[phone]  RN [place] [id number]  LP After hours(UC):[phone]  LP admin counselor:[phone]  CD assess:[phone]

## 2024-07-03 ENCOUNTER — HOSPITAL ENCOUNTER (OUTPATIENT)
Dept: BEHAVIORAL HEALTH | Facility: CLINIC | Age: 49
Discharge: HOME | End: 2024-07-03
Attending: FAMILY MEDICINE
Payer: COMMERCIAL

## 2024-07-03 PROCEDURE — 1002N00001 HC LODGING PLUS FACILITY CHARGE ADULT

## 2024-07-03 PROCEDURE — H2035 A/D TX PROGRAM, PER HOUR: HCPCS | Mod: HQ

## 2024-07-03 NOTE — GROUP NOTE
"Group Therapy Documentation    PATIENT'S NAME: Jose Alberto Cuellar  MRN:   [id number]  :   1975  ACCT. NUMBER: [id number]  DATE OF SERVICE: 24  START TIME: 12:30 PM  END TIME:  2:30 PM  FACILITATOR(S): Veronica Castellano LADC  TOPIC: BEH Group Therapy  Number of patients attending the group:  9  Group Length:  2 Hours    Group Therapy Type: Recovery strategies    Summary of Group / Topics Discussed:    Recovery Principles and Emotions/expression    Group Attendance:  Attended group session    Patient's response to the group topic/interactions:  cooperative with task    Patient appeared to be Attentive and Engaged.        Client specific details: Pt offered supportive feedback to his peers on their \"Guilt and Shame\" and \"Coping with Chemical Dependency and Anxiety Disorders\" assignments.  "

## 2024-07-03 NOTE — GROUP NOTE
"Group Therapy Documentation    PATIENT'S NAME: Jose Alberto Cuellar  MRN:   [id number]  :   1975  ACCT. NUMBER: [id number]  DATE OF SERVICE: 24  START TIME:  8:30 AM  END TIME:  9:30 AM  FACILITATOR(S): Shawnee Wyatt LADC; Randi Lord LADC  TOPIC: BEH Group Therapy  Number of patients attending the group:  26  Group Length:  1 Hours    Group Therapy Type: Recovery strategies and Emotion processing    Summary of Group / Topics Discussed:    Sober coping skills, Emotions/expression, and Relapse prevention  \"Struggle Switch\" - Supressing vs Detaching emotions.  Facilitated by unit couselor/psychotherapist.      Group Attendance:  Attended group session    Patient's response to the group topic/interactions:  cooperative with task    Patient appeared to be Engaged.        Client specific details:  Patient attended and listened attentively during skills group. Patient participated in the given activity.    "

## 2024-07-03 NOTE — GROUP NOTE
Group Therapy Documentation    PATIENT'S NAME: Jose Alberto Cuellar  MRN:   [id number]  :   1975  ACCT. NUMBER: [id number]  DATE OF SERVICE: 24  START TIME:  9:45 AM  END TIME: 11:30 AM  FACILITATOR(S): Randi Lord LADC  TOPIC: BEH Group Therapy  Number of patients attending the group:  8  Group Length:  2 Hours    Group Therapy Type: Emotion processing    Summary of Group / Topics Discussed:    Disease of addiction and Emotions/expression      Group Attendance:  Attended group session    Patient's response to the group topic/interactions:  cooperative with task    Patient appeared to be Actively participating, Attentive, and Engaged.        Client specific details: Jose Alberto was an active participant in morning group therapy session. He offered supportive feedback to a peer who shared his First Step assignment and also participated in a peer graduation ceremony.

## 2024-07-04 ENCOUNTER — HOSPITAL ENCOUNTER (OUTPATIENT)
Dept: BEHAVIORAL HEALTH | Facility: CLINIC | Age: 49
Discharge: HOME | End: 2024-07-04
Attending: FAMILY MEDICINE
Payer: COMMERCIAL

## 2024-07-04 PROCEDURE — H2035 A/D TX PROGRAM, PER HOUR: HCPCS | Mod: HQ

## 2024-07-04 PROCEDURE — 1002N00001 HC LODGING PLUS FACILITY CHARGE ADULT

## 2024-07-04 RX ORDER — NICOTINE 21 MG/24HR
1 PATCH, TRANSDERMAL 24 HOURS TRANSDERMAL EVERY 24 HOURS
COMMUNITY

## 2024-07-04 NOTE — GROUP NOTE
"Psychoeducation Group Documentation    PATIENT'S NAME: Jose Alberto Cuellar  MRN:   [id number]  :   1975  ACCT. NUMBER: [id number]  DATE OF SERVICE: 24  START TIME: 12:30 PM  END TIME:  1:30 PM  FACILITATOR(S): Veronica Castellano LADC; Camilla Escalera LADC  TOPIC: BEH Pyschoeducation  Number of patients attending the group:  25  Group Length:  1 Hours    Skills Group Therapy Type: Recovery skills, Emotion regulation skills, and Healthy behaviors development    Summary of Group / Topics Discussed:    Relationship/social skills and Relapse prevention skills.        Group Attendance:  Attended group session    Patient's response to the group topic/interactions:  cooperative with task    Patient appeared to be Engaged.         Client specific details: Pt participated in \"Scavenger Hunt, related to their recovery. .      "

## 2024-07-04 NOTE — PROGRESS NOTES
Nursing Discharge Planning Meeting    Writer completed discharge planning meeting with patient. Discharge is planned for 7/11/24    Discussed appropriate follow up care to manage JACKI/Medical/MH and to obtain medication refills. Patient given a copy of their current medications for reference. Questions were answered at this time and the patient verbalized an understanding of the post-discharge follow up plan.    Patient will f/u with [facility] in [facility] as recommended and/or is aware of upcoming appt:  [facility]  [address], [address] [facility] [id number] [phone]   Pt saw both providers below while at [facility]:   6/20 at 5pm - JEAN Cuellar PCP [facility] - skin condition  7/2 at 11:30AM - Jeri Nguyễn / [facility] - skin condition    Patient will f/u with Addiction Medicine Provider as recommended and/or is aware of upcoming appt:  [facility]  [address] [place] [address], [address] [facility] [id number] [phone]   Provider: Pt saw Cecile Adames in the past, but declines Addiction Med services at this time    Patient will attend appt's below and is aware of upcoming appt's:    DX:   [facility]  [address], [address]  [place]  [address]  [phone]  Appt Date and Time:  July 17, 2024 at 1:40pm  Provider: Lawanda Mcwilliams NP  Be off of Allergy medication 7 days prior  Be in the clinic 20 minutes early    [facility]  [address], [address]  [place]  [address]  P - [phone]  F - [phone]  August 2, 2024 at 1:45pm with Dr Esteban Fink    Continue to support patient in discharge planning as needed to assure appropriate continuity of care.     Tobacco Cessation  Patient participated in the nicotine replacement therapy for tobacco cessation or reduction during their treatment programming: Yes    The patient was provided with community resources for follow-up to  continue tobacco cessation support once in the community. Also the patient was encouraged to discuss their tobacco cessation efforts with the primary care provider.    [facility]  Nurse Liaison / [facility]  O: [phone]  Fax:[phone]  [facility] [id number]  M-F: 7AM to 5:30PM   Sat-Sun: 7AM to 3PM  After hours: [phone]

## 2024-07-04 NOTE — GROUP NOTE
"Psychoeducation Group Documentation    PATIENT'S NAME: Jose Alberto Cuellar  MRN:   [id number]  :   1975  [id number]  DATE OF SERVICE: 24  START TIME:  9:00 AM  END TIME: 11:00 AM  FACILITATOR(S): Arline Chandler RN; Yung Valverde LADC; Camilla Escalera LADC  TOPIC: BEH Pyschoeducation  Number of patients attending the group:  23  Group Length:  2 Hours    Skills Group Therapy Type: Recovery skills, Emotion regulation skills, and Daily living/independence skills    Summary of Group / Topics Discussed:    Balanced lifestyle skills, Mindfulness/Relaxation skills, and Relapse prevention skills.        Patients participated in a group lecture/discussion on preparing for change, identifying barriers/ triggers on \"relapse prevention\".    Patients engaged in mindful meditations.        Group Attendance:  Attended group session    Patient's response to the group topic/interactions:  cooperative with task    Patient appeared to be Engaged.         Client specific details:  Jose Alberto, participated in group activity and discussion.      "

## 2024-07-05 ENCOUNTER — HOSPITAL ENCOUNTER (OUTPATIENT)
Dept: BEHAVIORAL HEALTH | Facility: CLINIC | Age: 49
Discharge: HOME | End: 2024-07-05
Attending: FAMILY MEDICINE
Payer: COMMERCIAL

## 2024-07-05 PROCEDURE — H2035 A/D TX PROGRAM, PER HOUR: HCPCS | Mod: HQ

## 2024-07-05 PROCEDURE — 1002N00001 HC LODGING PLUS FACILITY CHARGE ADULT

## 2024-07-05 NOTE — GROUP NOTE
Group Therapy Documentation    PATIENT'S NAME: Jose Alberto Cuellar  MRN:   [id number]  :   1975  ACCT. NUMBER: [id number]  DATE OF SERVICE: 24  START TIME:  9:00 AM  END TIME: 11:00 AM  FACILITATOR(S): Randi Lord LADC  TOPIC: BEH Group Therapy  Number of patients attending the group:  8  Group Length:  2 Hours    Group Therapy Type: Emotion processing    Summary of Group / Topics Discussed:    Disease of addiction and Emotions/expression      Group Attendance:  Attended group session    Patient's response to the group topic/interactions:  cooperative with task    Patient appeared to be Actively participating, Attentive, and Engaged.        Client specific details: Jose Alberto was an active participant in morning group therapy session.

## 2024-07-05 NOTE — GROUP NOTE
Group Therapy Documentation    PATIENT'S NAME: Jose Alberto Cuellar  MRN:   [id number]  :   1975  ACCT. NUMBER: [id number]  DATE OF SERVICE: 24  START TIME: 12:30 AM  END TIME:  2:30 PM  FACILITATOR(S): Tammy Leroy LADC  TOPIC: BEH Group Therapy  Number of patients attending the group:  21  Group Length:  2 Hours    Group Therapy Type: Recovery strategies, Emotion processing, and Daily living/independence skills    Summary of Group / Topics Discussed:    Recovery Principles, Sober coping skills, Balanced lifestyle, Emotions/expression, and Self-care activities    Patients viewed an addiction/mental health based film. This film addressed: addiction as a disease, relationships and addiction, engagement in AA, and evaluating priorities when getting sober.   Patients engaged in a thorough discussion about the film, and shared personal experiences, and how the film helped them process their own life events.       Group Attendance:  Attended group session    Patient's response to the group topic/interactions:  cooperative with task    Patient appeared to be Actively participating.        Client specific details:  Jose Alberto Attended small group therapy. Patient engaged in discussion and participated in sharing feedback to their peers.   .

## 2024-07-06 ENCOUNTER — HOSPITAL ENCOUNTER (OUTPATIENT)
Dept: BEHAVIORAL HEALTH | Facility: CLINIC | Age: 49
Discharge: HOME | End: 2024-07-06
Attending: FAMILY MEDICINE
Payer: COMMERCIAL

## 2024-07-06 PROCEDURE — H2035 A/D TX PROGRAM, PER HOUR: HCPCS | Mod: HQ

## 2024-07-06 PROCEDURE — 1002N00001 HC LODGING PLUS FACILITY CHARGE ADULT

## 2024-07-06 NOTE — GROUP NOTE
Group Therapy Documentation    PATIENT'S NAME: Jose Alberto Cuellar  MRN:   [id number]  :   1975  ACCT. NUMBER: [id number]  DATE OF SERVICE: 24  START TIME:  9:00 AM  END TIME: 11:00 AM  FACILITATOR(S): Johann Townsend LADC  TOPIC: BEH Group Therapy  Number of patients attending the group:  9  Group Length:  2 Hours    Group Therapy Type: Recovery strategies    Summary of Group / Topics Discussed:    Recovery Principles, Relationship/socialization, Balanced lifestyle, Leisure explorations/use of leisure time, and Relapse prevention      Group Attendance:  Attended group session    Patient's response to the group topic/interactions:  cooperative with task    Patient appeared to be Attentive and Engaged.        Client specific details:  The patient participated in the morning lecture on recovery resources.

## 2024-07-07 ENCOUNTER — HOSPITAL ENCOUNTER (OUTPATIENT)
Dept: BEHAVIORAL HEALTH | Facility: CLINIC | Age: 49
Discharge: HOME | End: 2024-07-07
Attending: FAMILY MEDICINE
Payer: COMMERCIAL

## 2024-07-07 PROCEDURE — H2035 A/D TX PROGRAM, PER HOUR: HCPCS | Mod: HQ

## 2024-07-07 PROCEDURE — 1002N00001 HC LODGING PLUS FACILITY CHARGE ADULT

## 2024-07-07 NOTE — GROUP NOTE
"Psychoeducation Group Documentation    PATIENT'S NAME: Jose Alberto Cuellar  MRN:   [id number]  :   1975  ACCT. NUMBER: [id number]  DATE OF SERVICE: 24  START TIME: 12:30 PM  END TIME:  1:30 PM  FACILITATOR(S): Camilla Escalera LADC  TOPIC: BEH Pyschoeducation  Number of patients attending the group:  27  Group Length:  1 Hours    Skills Group Therapy Type: Recovery skills, Emotion regulation skills, Healthy behaviors development, and Relationship skills development    Summary of Group / Topics Discussed:    Relationship/social skills      All patients were appropriate and actively engaged during \"relationships and personal strengths\" lecture. This lecture  content was focused on \"Individual Personal\" strengths  and \"Weakness\" and on how personal strengths could be applied to accomplish recovery goals.         Group Attendance:  Attended group session    Patient's response to the group topic/interactions:  cooperative with task    Patient appeared to be Actively participating.         Client specific details:  Jose Alberto, participated in activity on \"Personal Strengths and \"Weakness\".      "

## 2024-07-07 NOTE — ADDENDUM NOTE
Encounter addended by: Johann Townsend LADC on: 7/7/2024 10:52 AM   Actions taken: Clinical Note Signed, Charge Capture section accepted

## 2024-07-07 NOTE — PROGRESS NOTES
[facility] Weekly Treatment Plan Review    Treatment plan review for the following date span:  6/30/24 to 7/7/24    ATTENDANCE  Patient did not have any absences during this time period (list absence dates and reason for absence).       Weekly Treatment Plan Review     Treatment Plan initiated on: 6/17/24.    Dimension1: Acute Intoxication/Withdrawal Potential -   Client Goals Addressed Since last Review: Be able to manage mild to moderate withdrawal symptoms.   Are Treatment Plan goals/methods effective? Yes  Date of Last Use 6/10/24  Any reports of withdrawal symptoms - None reported.      Dimension 2: Biomedical Conditions & Complications -   Client Goals Addressed Since last Review: Follow recommendations of medical provider. [facility]   Are Treatment Plan goals/methods effective? Yes  Medical Concerns:  None reported.  Vitals:   BP Readings from Last 3 Encounters:   07/02/24 110/58   06/20/24 (!) 143/87   06/13/24 (!) 155/90     Pulse Readings from Last 3 Encounters:   07/02/24 59   06/20/24 70   06/13/24 95     Wt Readings from Last 3 Encounters:   07/02/24 102.1 kg (225 lb)   06/20/24 100.9 kg (222 lb 8 oz)   06/10/24 102.1 kg (225 lb)     Temp Readings from Last 3 Encounters:   07/02/24 98.2  F (36.8  C) (Temporal)   06/20/24 99  F (37.2  C) (Temporal)   06/13/24 97  F (36.1  C)      Current Medications & Medication Changes:  Current Outpatient Medications   Medication Sig Dispense Refill    cetirizine (ZYRTEC) 10 MG tablet Take 1 tablet (10 mg) by mouth daily 90 tablet 3    folic acid (FOLVITE) 1 MG tablet Take 1 tablet (1 mg) by mouth daily 30 tablet 1    hydrOXYzine HCl (ATARAX) 25 MG tablet Take 1 tablet (25 mg) by mouth every 4 hours as needed for anxiety 90 tablet 1    levothyroxine (SYNTHROID/LEVOTHROID) 75 MCG tablet Take 1 tablet (75 mcg) by mouth daily 30 tablet 1    magnesium oxide (MAG-OX) 400 MG tablet Take 1 tablet (400 mg) by mouth daily  30 tablet 1    multivitamin w/minerals (THERA-VIT-M) tablet Take 1 tablet by mouth daily 30 tablet 1    nicotine (NICODERM CQ) 21 MG/24HR 24 hr patch Place 1 patch onto the skin every 24 hours      nicotine polacrilex (NICORETTE) 4 MG gum Place 4 mg inside cheek every hour as needed for nicotine withdrawal symptoms      predniSONE (DELTASONE) 20 MG tablet Take 2 tablets (40 mg) by mouth daily for 5 days 10 tablet 0    thiamine (B-1) 100 MG tablet Take 1 tablet (100 mg) by mouth daily 30 tablet 1    traZODone (DESYREL) 50 MG tablet Take 1 tablet (50 mg) by mouth at bedtime 30 tablet 1    triamcinolone (ARISTOCORT HP) 0.5 % external cream Apply topically 2 times daily for 7 days.  Apply to rash on arms and legs. 454 g 0    venlafaxine (EFFEXOR XR) 75 MG 24 hr capsule Take 3 capsules (225 mg) by mouth daily (with breakfast) 90 capsule 1     No current facility-administered medications for this encounter.     Facility-Administered Medications Ordered in Other Encounters   Medication Dose Route Frequency Provider Last Rate Last Admin    Self Administer Medications: Behavioral Services   Does not apply See Admin Instructions Lynsey Matt MD         Taking meds as prescribed? Yes  Medication side effects or concerns:  None reported.  Outside medical appointments since last review (list provider and reason for visit): None reported.    Dimension 3: Emotional/Behavioral Conditions & Complications -   Client Goals Addressed Since last Review: Understand the relationship between addiction and mental health issues.   Are Treatment Plan goals/methods effective? Yes  Mental health diagnosis: depression, anxiety  PHQ2:       7/7/2024     2:00 PM 6/29/2024    10:00 AM 6/24/2024     1:00 PM 2/11/2024    10:00 AM 2/5/2024     8:00 AM 1/29/2024    10:00 AM 1/24/2024     5:00 PM   PHQ-2 ( 1999 [employer])   Q1: Little interest or pleasure in doing things 1 2 3 1 1 2 1   Q2: Feeling down, depressed or hopeless 1 2 3 1 1 2 1   PHQ-2  "Score 2 4 6 2 2 4 2      GAD2:       1/24/2024     5:00 PM 1/29/2024    10:00 AM 2/5/2024     8:00 AM 2/11/2024    10:00 AM 6/24/2024     1:00 PM 6/29/2024    10:00 AM 7/7/2024     2:00 PM   CINTHIA-2   Feeling nervous, anxious, or on edge 1 1 1 1 2 1 1   Not being able to stop or control worrying 1 1 1 1 2 1 1   CINTHIA-2 Total Score 2 2 2 2 4 2 2     Date of last SIB:  NA  Date of  last SI:  passive prior to admission  Date of last HI: NA  Behavioral Targets: Stabilize and maintain mental health.  Current MH Assignments:  Anxiety and Recovery from Chemical Dependency    Additional Narrative:  Current Mental Health symptoms include: none identified.  Active interventions to stabilize mental health symptoms this week : group therapy. He reports \"no change\" in his stress level and mood this past week. Patient reports that he's managed stressful situations by working out, talking with peers and sleeping as his primary coping skills he uses to manage stress and difficult emotions.  Patient denies any suicidal thoughts or ideations at this time. Patient will continue to be monitored throughout treatment.       Dimension 4: Treatment Acceptance / Resistance -   Client Goals Addressed Since last Review: Understand the impact your substance use has had on your family and significant relationships.   Are Treatment Plan goals/methods effective? Yes  JACKI Diagnosis:  Alcohol Use Disorder   303.90 (F10.20) Severe In a controlled environment  Commitment to tx process/Stage of change- Contemplation  JACKI assignments - \"What sobriety means to me\"      Additional Narrative - Patient verbally reports being motivated for long-term recovery. Patient reports \"wanting good health, meaningful relationships and a fulfilling life\" as his primary motivation to stay sober and in treatment this week.  Patient's group attendance and participation have been positive overall.    Dimension 5: Relapse / Continued Problem Potential -   Client Goals " Addressed Since last Review: Identify and understand personal relapse and self-sabotage patterns.   Are Treatment Plan goals/methods effective? Yes  Relapses this week - None  Urges to use - Yes. Patient reports a 7/10 for urges and cravings.  UA results -   Recent Results (from the past 672 hour(s))   Comprehensive metabolic panel    Collection Time: 06/10/24  1:29 PM   Result Value Ref Range    Sodium 141 135 - 145 mmol/L    Potassium 3.6 3.4 - 5.3 mmol/L    Carbon Dioxide (CO2) 25 22 - 29 mmol/L    Anion Gap 13 7 - 15 mmol/L    Urea Nitrogen 13.9 6.0 - 20.0 mg/dL    Creatinine 0.76 0.67 - 1.17 mg/dL    GFR Estimate >90 >60 mL/min/1.73m2    Calcium 9.6 8.6 - 10.0 mg/dL    Chloride 103 98 - 107 mmol/L    Glucose 106 (H) 70 - 99 mg/dL    Alkaline Phosphatase 69 40 - 150 U/L    AST 30 0 - 45 U/L    ALT 31 0 - 70 U/L    Protein Total 7.0 6.4 - 8.3 g/dL    Albumin 4.3 3.5 - 5.2 g/dL    Bilirubin Total <0.2 <=1.2 mg/dL   Magnesium    Collection Time: 06/10/24  1:29 PM   Result Value Ref Range    Magnesium 1.6 (L) 1.7 - 2.3 mg/dL   Ethyl Alcohol Level    Collection Time: 06/10/24  1:29 PM   Result Value Ref Range    Alcohol ethyl 0.11 (H) <=0.01 g/dL   CBC with platelets and differential    Collection Time: 06/10/24  1:29 PM   Result Value Ref Range    WBC Count 4.3 4.0 - 11.0 10e3/uL    RBC Count 4.49 4.40 - 5.90 10e6/uL    Hemoglobin 14.7 13.3 - 17.7 g/dL    Hematocrit 42.9 40.0 - 53.0 %    MCV 96 78 - 100 fL    MCH 32.7 26.5 - 33.0 pg    MCHC 34.3 31.5 - 36.5 g/dL    RDW 14.2 10.0 - 15.0 %    Platelet Count 179 150 - 450 10e3/uL    % Neutrophils 68 %    % Lymphocytes 16 %    % Monocytes 13 %    % Eosinophils 1 %    % Basophils 1 %    % Immature Granulocytes 1 %    NRBCs per 100 WBC 0 <1 /100    Absolute Neutrophils 3.0 1.6 - 8.3 10e3/uL    Absolute Lymphocytes 0.7 (L) 0.8 - 5.3 10e3/uL    Absolute Monocytes 0.6 0.0 - 1.3 10e3/uL    Absolute Eosinophils 0.0 0.0 - 0.7 10e3/uL    Absolute Basophils 0.0 0.0 - 0.2 10e3/uL  "   Absolute Immature Granulocytes 0.0 <=0.4 10e3/uL    Absolute NRBCs 0.0 10e3/uL   Urine Drug Screen Panel    Collection Time: 06/10/24  2:25 PM   Result Value Ref Range    Amphetamines Urine Screen Negative Screen Negative    Barbituates Urine Screen Negative Screen Negative    Benzodiazepine Urine Screen Negative Screen Negative    Cannabinoids Urine Screen Negative Screen Negative    Cocaine Urine Screen Negative Screen Negative    Fentanyl Qual Urine Screen Negative Screen Negative    Opiates Urine Screen Negative Screen Negative    PCP Urine Screen Negative Screen Negative   Lipid panel    Collection Time: 06/11/24  7:32 AM   Result Value Ref Range    Cholesterol 288 (H) <200 mg/dL    Triglycerides 324 (H) <150 mg/dL    Direct Measure HDL 68 >=40 mg/dL    LDL Cholesterol Calculated 155 (H) <=100 mg/dL    Non HDL Cholesterol 220 (H) <130 mg/dL   GGT    Collection Time: 06/11/24  7:32 AM   Result Value Ref Range    GGT 24 8 - 61 U/L   TSH with free T4 reflex and/or T3 as indicated    Collection Time: 06/11/24  7:32 AM   Result Value Ref Range    TSH 1.68 0.30 - 4.20 uIU/mL   Magnesium    Collection Time: 06/11/24  7:32 AM   Result Value Ref Range    Magnesium 1.6 (L) 1.7 - 2.3 mg/dL   Extra Purple Top Tube    Collection Time: 06/11/24  7:32 AM   Result Value Ref Range    Hold Specimen [facility]    Urine Drug Confirmation Panel    Collection Time: 06/13/24  2:16 PM   Result Value Ref Range    Nordiazepam Present (A) Absent    Oxazepam Present (A) Absent    Temazepam Present (A) Absent   Urine Creatinine for Drug Screen Panel    Collection Time: 06/13/24  2:16 PM   Result Value Ref Range    Creatinine Urine for Drug Screen 65 mg/dL   Patients UA was all negative on 7/3/24.  Identified triggers - \"Reality\"  Coping skills identified - \"working out.\" Patient is able to utilize these skills when needed.    Additional Narrative-  Patient is continuing to work on gaining awareness and insight regarding his triggers, cues and " "early warning signs for relapse. Patient rates his cravings this week as a 7, 1-(low)-10-(high). Patient reports that he's managed his cravings by working out. Patient attended Spirituality Group facilitated by Adwoa Armenta. He continues to work on developing relapse prevention strategies and sober coping skills.    Dimension 6: Recovery Environment -   Client Goals Addressed Since last Review: Develop a sober support network.   Are Treatment Plan goals/methods effective? Yes  Family Involvement - Patient reports having contact with his mom, dad, and sisters.  Summarize participation in family sessions - NA  Family supportive of program/stages?  Yes      Community support group attendance -  Patient is attending nightly sober support meetings/lectures.  Recreational activities - Patient has been participating in offered program activities and leisure time with peers.      Additional Narrative - Patient has been spending time with same gender-peers and connecting with/building a sober support network. Patient reports his aftercare plan will include \"not sure\".    Progress made on transition planning goals: none    Justification for Continued Treatment at this Level of Care:  Patient continues to work on completing treatment plan goals and interventions.   Patient remains a high risk for relapse at this time and would benefit from continued support in developing relapse prevention strategies.   Patient's mental health symptoms are currently impacting his daily functioning and would benefit from continued support.  Treatment coordination activities since last review:   None  Need for peer recovery support referral? No    Discharge Planning:  Target Discharge Date/Timeframe:  7/11/24  Med Mgmt Provider/Appt:  KEMI  Ind therapy Provider/Appt:  KEMI  Family therapy Provider/Appt:  KEMI          Dimension Scale Review     Prior ratings: Dim1 - 0 DIM2 - 0 DIM3 - 2 DIM4 - 2 DIM5 - 4 DIM6 -4     Current ratings: Dim1 - 0 DIM2 " - 0 DIM3 - 2 DIM4 - 2 DIM5 - 4 DIM6 -4     Is patient a vulnerable adult?  Yes - reviewed and evaluated IAPP? Yes        *Client received copy of changes: N/A  *Client is aware of right to access a treatment plan review: Yes    ROWAN Beckford

## 2024-07-07 NOTE — GROUP NOTE
Group Therapy Documentation    PATIENT'S NAME: Jose Alberto Cuellar  MRN:   [id number]  :   1975  ACCT. NUMBER: [id number]  DATE OF SERVICE: 24  START TIME: 12:30 PM  END TIME:  2:30 PM  FACILITATOR(S): Johann Townsend LADC; Constantine Malloy LADC  TOPIC: BEH Group Therapy  Number of patients attending the group:  9  Group Length:  2 Hours    Group Therapy Type: Recovery strategies    Summary of Group / Topics Discussed:    Recovery Principles and Relationship/socialization    Group Attendance:  Attended group session    Patient's response to the group topic/interactions:  cooperative with task    Patient appeared to be Attentive and Engaged.        Client specific details:  The patient participated in the afternoon lecture on relationships.

## 2024-07-07 NOTE — GROUP NOTE
Psychoeducation Group Documentation    PATIENT'S NAME: Jose Alberto Cuellar  MRN:   [id number]  :   1975  ACCT. NUMBER: [id number]  DATE OF SERVICE: 24  START TIME:  8:45 AM  END TIME: 10:45 AM  FACILITATOR(S): Johann Townsend LADC; Chapito Dee RN  TOPIC: BEH Pyschoeducation  Number of patients attending the group:  9  Group Length:  2 Hours    Skills Group Therapy Type: Healthy behaviors development and Medication education    Summary of Group / Topics Discussed:    Symptom management skills and Medication management skills        Group Attendance:  Attended group session    Patient's response to the group topic/interactions:  cooperative with task    Patient appeared to be Attentive and Engaged.         Client specific details:  The patient participated in the morning nursing lecture on HIV/AIDS.

## 2024-07-08 ENCOUNTER — HOSPITAL ENCOUNTER (OUTPATIENT)
Dept: BEHAVIORAL HEALTH | Facility: CLINIC | Age: 49
Discharge: HOME | End: 2024-07-08
Attending: FAMILY MEDICINE
Payer: COMMERCIAL

## 2024-07-08 PROCEDURE — 1002N00001 HC LODGING PLUS FACILITY CHARGE ADULT

## 2024-07-08 PROCEDURE — H2035 A/D TX PROGRAM, PER HOUR: HCPCS | Mod: HQ

## 2024-07-08 NOTE — GROUP NOTE
Group Therapy Documentation    PATIENT'S NAME: Jose Alberto Cuellar  MRN:   [id number]  :   1975  ACCT. NUMBER: [id number]  DATE OF SERVICE: 24  START TIME:  9:00 AM  END TIME: 11:00 AM  FACILITATOR(S): Randi Lord LADC  TOPIC: BEH Group Therapy  Number of patients attending the group:  8  Group Length:  2 Hours    Group Therapy Type: Emotion processing    Summary of Group / Topics Discussed:    Disease of addiction and Emotions/expression      Group Attendance:  Attended group session    Patient's response to the group topic/interactions:  cooperative with task    Patient appeared to be Actively participating, Attentive, and Engaged.        Client specific details: Jose Alberto was an active participant in morning group therapy session. He took part in a goal-setting exercise and provided feedback to a peer who shared a treatment plan assignment.

## 2024-07-08 NOTE — GROUP NOTE
Group Therapy Documentation    PATIENT'S NAME: Jose Alberto Cuellar  MRN:   [id number]  :   1975  ACCT. NUMBER: [id number]  DATE OF SERVICE: 24  START TIME: 12:30 PM  END TIME:  2:30 PM  FACILITATOR(S): Johann Townsend LADC  TOPIC: BEH Group Therapy  Number of patients attending the group:  8  Group Length:  2 Hours    Group Therapy Type: Recovery strategies    Summary of Group / Topics Discussed:    Recovery Principles, Mindfulness/Relaxation, Coping/DBT informed care, Trauma informed care, Disease of addiction, and Emotions/expression      Group Attendance:  Attended group session    Patient's response to the group topic/interactions:  cooperative with task    Patient appeared to be Attentive and Engaged.        Client specific details:  Jose Alberto participated in the afternoon group which consisted of a group discussion on the 12 steps, sponsorship, and recovery meetings.

## 2024-07-09 ENCOUNTER — HOSPITAL ENCOUNTER (OUTPATIENT)
Dept: BEHAVIORAL HEALTH | Facility: CLINIC | Age: 49
Discharge: HOME | End: 2024-07-09
Attending: FAMILY MEDICINE
Payer: COMMERCIAL

## 2024-07-09 PROCEDURE — H2035 A/D TX PROGRAM, PER HOUR: HCPCS | Mod: HQ

## 2024-07-09 PROCEDURE — 1002N00001 HC LODGING PLUS FACILITY CHARGE ADULT

## 2024-07-09 PROCEDURE — H2035 A/D TX PROGRAM, PER HOUR: HCPCS | Mod: HQ | Performed by: COUNSELOR

## 2024-07-09 NOTE — GROUP NOTE
"Group Therapy Documentation    PATIENT'S NAME: Jose Alberto Cuellar  MRN:   [id number]  :   1975  ACCT. NUMBER: [id number]  DATE OF SERVICE: 24  START TIME: 12:30 PM  END TIME:  2:30 PM  FACILITATOR(S): Kimmie Garcia LADC  TOPIC: BEH Group Therapy  Number of patients attending the group:  8  Group Length:  2 Hours    Group Therapy Type: Recovery strategies and Emotion processing    Summary of Group / Topics Discussed:    Recovery Principles, Disease of addiction, Emotions/expression, and Relapse prevention    Writer facilitated psychotherapy group utilizing cognitive behavioral and motivational interviewing strategies to identify the relationship between thoughts, emotions, and behavior as well as validating change talk and exploring ambivalence.  A peer presented a substance use history and all peers provided feedback.        Group Attendance:  Attended group session    Patient's response to the group topic/interactions:  cooperative with task, discussed personal experience with topic, listened actively, and offered helpful suggestions to peers    Patient appeared to be Actively participating, Attentive, and Engaged.        Client specific details:  Patient reported current mood was, \"peaceful...hopeful.\"  Patient actively participated in today's group and provided appropriate and insightful feedback to presenting peer.  Patient reported increased insight and awareness in recovery strategies following the session.    "

## 2024-07-09 NOTE — GROUP NOTE
"Group Therapy Documentation    PATIENT'S NAME: Jose Alberto Cuellar  MRN:   [id number]  :   1975  ACCT. NUMBER: [id number]  DATE OF SERVICE: 24  START TIME:  9:00 AM  END TIME: 11:00 AM  FACILITATOR(S): Johann Townsend LADC  TOPIC: BEH Group Therapy  Number of patients attending the group:  8  Group Length:  2 Hours    Group Therapy Type: Recovery strategies    Summary of Group / Topics Discussed:    Recovery Principles, Coping/DBT informed care, Trauma informed care, Disease of addiction, and Emotions/expression      Group Attendance:  Attended group session    Patient's response to the group topic/interactions:  cooperative with task    Patient appeared to be Attentive and Engaged.        Client specific details:  Jose Alberto participated in morning group. He checked in and took part in the reading and discussion on letting people make their own mistakes and staying in your danna. He presented his \"Denial and Consequences\" assignment to the group. He took part in a reading and discussion on how unhealthy relationships and substance abuse are similar.    "

## 2024-07-09 NOTE — GROUP NOTE
Group Therapy Documentation    PATIENT'S NAME: Jose Alberto Cuellar  MRN:   [id number]  :   1975  ACCT. NUMBER: [id number]  DATE OF SERVICE: 24  START TIME:  3:00 AM  END TIME:  4:00 PM  FACILITATOR(S): Shawnee Wyatt LADC; Johann Townsend LADC; Kimmie Garcia LADC  TOPIC: BEH Group Therapy  Number of patients attending the group:  24  Group Length:  1 Hours    Group Therapy Type: Recovery strategies and Daily living/independence skills    Summary of Group / Topics Discussed:    Sober coping skills and Relationship/socialization  Communication skills in recovery facilitated by staff counselor.      Group Attendance:  Attended group session    Patient's response to the group topic/interactions:  cooperative with task    Patient appeared to be Engaged.        Client specific details:  Patient attended PM skills group and shared appropriate feedbacks.

## 2024-07-09 NOTE — GROUP NOTE
Group Therapy Documentation    PATIENT'S NAME: Jose Alberto Cuellar  MRN:   [id number]  :   1975  ACCT. NUMBER: [id number]  DATE OF SERVICE: 24  START TIME: 12:30 PM  END TIME:  2:30 PM  FACILITATOR(S): Tammy Leroy LADC; Adwoa Armenta  TOPIC: BEH Group Therapy  Number of patients attending the group:  24  Group Length:  2 Hours    Group Therapy Type: Recovery strategies    Summary of Group / Topics Discussed:    Spiritual Care    Spiritual Group Therapy consisted of Spiritual Care and addressing Principles that are important in recovery, and wellness of self. Patients and facilitators (Roberto & PADMINIC)  reviewed topics related to sense of self, identity, and relations to others within spirituality/relision/nonspiritual concepts.       Group Attendance:  {Group Attendance:170836}    Patient's response to the group topic/interactions:  {OPBEHCLIENTRESPONSE:152525}    Patient appeared to be {Engagement:729582}.        Client specific details:  ***.

## 2024-07-10 ENCOUNTER — HOSPITAL ENCOUNTER (OUTPATIENT)
Dept: BEHAVIORAL HEALTH | Facility: CLINIC | Age: 49
Discharge: HOME | End: 2024-07-10
Attending: FAMILY MEDICINE
Payer: COMMERCIAL

## 2024-07-10 PROCEDURE — H2035 A/D TX PROGRAM, PER HOUR: HCPCS | Mod: HQ

## 2024-07-10 PROCEDURE — 1002N00001 HC LODGING PLUS FACILITY CHARGE ADULT

## 2024-07-10 NOTE — GROUP NOTE
Group Therapy Documentation    PATIENT'S NAME: Jose Alberto Cuellar  MRN:   [id number]  :   1975  ACCT. NUMBER: [id number]  DATE OF SERVICE: 7/10/24  START TIME:  8:30 AM  END TIME:  9:30 AM  FACILITATOR(S): Randi Lord LADC; Yung Valverde LADC; Jw Garces MD  TOPIC: BEH Group Therapy  Number of patients attending the group:  20  Group Length:  1 Hours    Group Therapy Type: Health and wellbeing     Summary of Group / Topics Discussed:    Disease of addiction      Group Attendance:  Attended group session    Patient's response to the group topic/interactions:  cooperative with task    Patient appeared to be Attentive and Engaged.        Client specific details: Jose Alberto listened respectfully to Dr. Garces's presentation on the disease of addiction.

## 2024-07-10 NOTE — PROGRESS NOTES
[facility]   Client Name: Jose Alberto Cuellar MR#:  [id number]  YOB: 1975        Age: 49 years old Sex: Male    Focus of Treatment / Discharge Recommendations  Personal Safety/ Management of Symptoms  * Follow your safety plan.  Report increased symptoms to your care team and /or go to the nearest Emergency Department.  * Call crisis lines as needed    [facility] [place] [phone]                Crisis Connection [phone]  [facility] [phone]               [facility] [phone]  [facility] [phone]           National Suicide Prevention [phone] OR Text/Call 828  [facility] [phone]             Suicide Prevention [phone]     [facility]: [phone]  [place] [facility]: [phone]  [place] [facility]: [phone]    Abstinence/Relapse Prevention  * Take all medicines as directed.  Carry a current list of medicines with you.  * Use coping skills: nutrition, rest, exercise, spirituality, journal, meditation, engage in activities you enjoy, and sober support resources.   * Remain abstinent from alcohol and non-prescribed, mood altering substances.     Develop/Improve Independent Living/Socialization Skills: Ensure living environment is conducive to sobriety.     Community Resources/Supports and Discharge Planning:    Follow up with medical appointments as needed and as given in nursing discharge instructions.    Go to group therapy and / or support groups at:   [facility]  [phone]    Attend 2-3 sober support meetings weekly and work with a sponsor.    Client Signature:_______________________   Date / Time:___________    Staff Signature:________________________   Date / Time:___________

## 2024-07-10 NOTE — GROUP NOTE
Group Therapy Documentation    PATIENT'S NAME: Jose Alberto Cuellar  MRN:   [id number]  :   1975  ACCT. NUMBER: [id number]  DATE OF SERVICE: 7/10/24  START TIME: 12:30 PM  END TIME:  2:30 PM  FACILITATOR(S): Pebbles Herrera LADC  TOPIC: BEH Group Therapy  Number of patients attending the group:  7    Group Length:  2 Hours    Group Therapy Type: Recovery strategies, Emotion processing, and Daily living/independence skills    Summary of Group / Topics Discussed:    Recovery Principles, Relationship/socialization, and Relapse prevention      Group Attendance:  Attended group session    Patient's response to the group topic/interactions:  cooperative with task    Patient appeared to be Actively participating, Attentive, and Engaged.        Client specific details: Patient attended group session and was attentive and participative.

## 2024-07-10 NOTE — PROGRESS NOTES
[address]  [place] [address]              Jose Alberto Cuellar, 1975 : was admitted for evaluation/treatment of chemical dependency at [facility].  He took part in these program(s):     ______ The Inpatient Program   ______ The Outpatient Program   ___X___ [facility]   ______ [facility]         Date admitted: 6/13/24    Date discharged: 7/11/24     Type of discharge:     ___X__ Satisfactory - completed evaluation / treatment   ______ Discharged without completing   ______ Behavioral discharge   ______ Transferred to another chemical dependency program   ______ Transferred to another type of service   ______ Left against medical advice (AMA) / Eloped               Clinicians: ROWAN Umanzor & ROWAN Pereyra, [facility]     Date: 7/10/24           Time: 1:00pm

## 2024-07-10 NOTE — GROUP NOTE
Group Therapy Documentation    PATIENT'S NAME: Jose Alberto Cuellar  MRN:   [id number]  :   1975  ACCT. NUMBER: [id number]  DATE OF SERVICE: 7/10/24  START TIME:  9:45 AM  END TIME: 11:30 AM  FACILITATOR(S): Randi Lord LADC  TOPIC: BEH Group Therapy  Number of patients attending the group:  7  Group Length:  2 Hours    Group Therapy Type: Emotion processing    Summary of Group / Topics Discussed:    Disease of addiction and Emotions/expression      Group Attendance:  Attended group session    Patient's response to the group topic/interactions:  cooperative with task    Patient appeared to be Actively participating, Attentive, and Engaged.        Client specific details: Jose Alberto was an active participant in morning group therapy session.

## 2024-07-11 NOTE — ADDENDUM NOTE
Encounter addended by: Pebbles Herrera LADC on: 7/11/2024 9:58 AM   Actions taken: Clinical Note Signed

## 2024-07-11 NOTE — PROGRESS NOTES
COUNSELOR S DISCHARGE SUMMARY    Date: 2024   Program Name:  [facility]    Client Name:  Jose Alberto Cuellar YOB: 1975    MRN #  [id number]    Referred by: ROWAN Collins       Release copies to :[facility]    Admit Date:   24                 Discharge Date: 24      # of days attended 28     Discharge Status:  With Staff Approval    PROBLEMS PRESENTED AT ADMISSION:  (Include reasons & circumstances for admission)    Jose Alberto Cuellar is a 49 year old year old male the patient had an assessment and placement summary update on 2024 completed by ROWAN Collins.  The patient was seen for a face-to-face update of the assessment and placement summary update on 2024 conducted by ROWAN Wallace    Admitting Diagnosis: Alcohol Use Disorder, Severe F10.20-(303.90)      PROGRAM PARTICIPATION:  While at [facility], Jose Alberto Cuellar received the following services to address substance use and mental health disorders.  he participated in:  Group Therapy, Spirituality Groups, DBT Skills Groups, AA/NA meetings , and Life Skills Groups      PROGRESS:     Dimension 1 - Acute Intoxication/Withdrawal Potential:  Admission ASAM Risk Ratin Client displays full functioning with good ability to tolerate and cope with withdrawal discomfort. No signs or symptoms of intoxication or withdrawal or resolving signs or symptoms.    Discharge ASAM Risk Ratin Client displays full functioning with good ability to tolerate and cope with withdrawal discomfort. No signs or symptoms of intoxication or withdrawal or resolving signs or symptoms.    Area of Treatment Focus: Substance use cravings and urges.  Patient reported last use date for alcohol as  6/10/2024    Treatment Goal(s): Be able to manage mild to moderate withdrawal symptoms    Progress Toward Goal(s): Patient was to report to the nurse and counselors any increase in  withdrawal symptomology ASAP    Dimension 2 - Biomedical Conditions & Complications:  Admission ASAM Risk Ratin Client displays full functioning with good ability to cope with physical discomfort.    Discharge ASAM Risk Ratin Client displays full functioning with good ability to cope with physical discomfort.    Area of Treatment Focus: Patient reported biomedical condition of hypothyroidism    Treatment Goal(s): Patient was to follow the recommendations of medical provider and schedule an appointment with PCP while in [facility] program    Progress Toward Goal(s): Patient was to continue to take prescribed medications and follow-up with medical interventions while in [facility]    Dimension 3 - Emotional/Behavioral Conditions & Complications:  Admission ASAM Risk Ratin Client has difficulty with impulse control and lacks coping skills. Client has thoughts of suicide or harm to others without means; however, the thoughts may interfere with participation in some treatment activities. Client has difficulty functioning in significant life areas. Client has moderate symptoms of emotional, behavioral, or cognitive problems. Client is able to participate in most treatment activities.    Discharge ASAM Risk Ratin Client has difficulty with impulse control and lacks coping skills. Client has thoughts of suicide or harm to others without means; however, the thoughts may interfere with participation in some treatment activities. Client has difficulty functioning in significant life areas. Client has moderate symptoms of emotional, behavioral, or cognitive problems. Client is able to participate in most treatment activities.    Area of Treatment Focus: Patient reported a mental health diagnosis of anxiety and depression    Treatment Goal(s): The goal is for the patient to understand the relationship between addiction and mental health issues    Progress Toward Goal(s): In order to address this the  "patient read \"Understanding Depression and Addiction\" assignment, wrote a 2 page reflection paper and shared in group. Patient also read \"Anxiety and Recovery from Chemical Dependency\" wrote a 2 page reflection paper and shared in group as well    Area of Treatment Focus: Patient participated in a suicide risk screening and was rated as a low risk for suicide    Treatment Goal(s): Patient was to remain safe throughout his stay at [facility]    Progress Towards Goal(s): Patient was to be aware of warning signs for self-harm and report any symptoms or increase in depression/anxiety ASAP. Patient was also monitored and would have been reassessed if a change in risk rating was indicated.    Dimension 4 - Treatment Acceptance/Resistance:  Admission ASAM Risk Ratin Client displays verbal compliance, but lacks consistent behaviors; has low motivation for change; and is passively involved in treatment.    Discharge ASAM Risk Ratin Client displays verbal compliance, but lacks consistent behaviors; has low motivation for change; and is passively involved in treatment.    Area of Treatment Focus: Patient has experienced consequences to himself and others due to his substance abuse    Treatment Goal(s): Patient was to understand the impact his substance abuse has had on his family and significant relationships    Progress Toward Goal(s): Patient completed \"What Does Addiction Mean to Me?\" assignment and presented in group. Patient completed his \"Drug Use History\" assignment as well as his \"Own Reasons to Quit\" assignment and presented both in group    Dimension 5 - Relapse/Continued Problem Potential:  Admission ASAM Risk Ratin No awareness of the negative impact of mental health problems or substance abuse. No coping skills to arrest mental health or addiction illnesses, or prevent relapse.    Discharge ASAM Risk Ratin No awareness of the negative impact of mental health problems or substance abuse. No coping " "skills to arrest mental health or addiction illnesses, or prevent relapse.    Area of Treatment Focus: Patient has a history of relapses and self sabotage patterns    Treatment Goal(s): The goal was for the patient to identify and understand personal relapse and self sabotage patterns and use that insight to remain sober    Progress Toward Goal(s): In order to address this the patient completed his \"Guilt and Shame Packet\" assignment and presented in group. Patient completed his \"Self Sabotage and Self-Defeating Behaviors\" assignment and presented in group  Patient completed his \"Denial and Consequences\" assignment and presented in group Patient also completed his \"Relapse Awareness and Prevention Plan\" assignment and presented in group.   Lastly, patient participated in a \"Relapse Prevention Workshop\" and completed all activities.    Dimension 6 - Recovery Environment: (family, recreation, legal, education, etc.)  Admission ASAM Risk Ratin Client has (A) Chronically antagonistic significant other, living environment, family, peer group or long-term criminal justice involvement that is harmful to recovery or treatment progress, or (B) Client has an actively antagonistic significant other, family, work, or living environment with immediate threat to the client's safety and well-being.    Discharge ASAM Risk Ratin Client has (A) Chronically antagonistic significant other, living environment, family, peer group or long-term criminal justice involvement that is harmful to recovery or treatment progress, or (B) Client has an actively antagonistic significant other, family, work, or living environment with immediate threat to the client's safety and well-being.    Area of Treatment Focus: Patient lacked a sober support network    Treatment Goal(s): Patient was for patient to develop a sober support network while in treatment at [facility]    Progress Toward Goal(s): Patient attended all 12-step meetings while in " treatment.   Patient also worked with [facility] staff to develop an aftercare plan and follow-up with legal issues. Patient was to obtain a sponsor and continue to develop relationships with men in sobriety. Patient also participated in the weekend relationship workshops and completed all activities    Client Strengths Identified During Treatment: Jose Alberto maintained a positive attitude while in treatment. He was an active participant in group settings and was always open for feedback from his peers. He worked to remain open and honestly attempted to challenge himself to work on self improvement. Jose Alberto appears motivated for recovery at this time and willing to incorporate positive changes into his life.:       Client Needs Identified During Treatment: Mental health struggles, emotional management, poor self-esteem, lack of follow-through.     Discharge Diagnosis:  Alcohol Use Disorder, Severe, F10.20-(303.90)    Discharge Medications:   Current Outpatient Medications   Medication Sig Dispense Refill    cetirizine (ZYRTEC) 10 MG tablet Take 1 tablet (10 mg) by mouth daily 90 tablet 3    folic acid (FOLVITE) 1 MG tablet Take 1 tablet (1 mg) by mouth daily 30 tablet 1    hydrOXYzine HCl (ATARAX) 25 MG tablet Take 1 tablet (25 mg) by mouth every 4 hours as needed for anxiety 90 tablet 1    levothyroxine (SYNTHROID/LEVOTHROID) 75 MCG tablet Take 1 tablet (75 mcg) by mouth daily 30 tablet 1    magnesium oxide (MAG-OX) 400 MG tablet Take 1 tablet (400 mg) by mouth daily 30 tablet 1    multivitamin w/minerals (THERA-VIT-M) tablet Take 1 tablet by mouth daily 30 tablet 1    nicotine (NICODERM CQ) 21 MG/24HR 24 hr patch Place 1 patch onto the skin every 24 hours      nicotine polacrilex (NICORETTE) 4 MG gum Place 4 mg inside cheek every hour as needed for nicotine withdrawal symptoms      thiamine (B-1) 100 MG tablet Take 1 tablet (100 mg) by mouth daily 30 tablet 1    traZODone (DESYREL) 50 MG tablet Take 1 tablet (50 mg) by  mouth at bedtime 30 tablet 1    triamcinolone (ARISTOCORT HP) 0.5 % external cream Apply topically 2 times daily for 7 days.  Apply to rash on arms and legs. 454 g 0    venlafaxine (EFFEXOR XR) 75 MG 24 hr capsule Take 3 capsules (225 mg) by mouth daily (with breakfast) 90 capsule 1     No current facility-administered medications for this encounter.     Facility-Administered Medications Ordered in Other Encounters   Medication Dose Route Frequency Provider Last Rate Last Admin    Self Administer Medications: Behavioral Services   Does not apply See Admin Instructions Lynsey Matt MD           Discharge Plan and Recommendations:    Living Arrangements at Discharge .  Patient terminated services due program completion.   Patient will admit to [facility] for aftercare      The following continued care recommendations have been    1.)  Abstain from all mood altering chemicals except those prescribed as nonaddictive  2.)  Attendance at a minimum of three 12-step meetings per week  3.)  Obtain a permanent male sponsor and maintain regular contact with him  4.)  Admit immediately to [facility] for aftercare and complete program  5.)  Monitor and comply with the advice of doctors regarding mental and physical health issues, take all medications as prescribed  6.)  Continue to invest in building a sober support network  7.)  Continue to monitor and gain understanding of relapse triggers and stressors through the use and development of healthy coping skills    Prognosis:  Good      Staff Signature:  ROWAN Solis

## 2024-08-13 DIAGNOSIS — F41.9 ANXIETY: ICD-10-CM

## 2024-08-13 DIAGNOSIS — F33.1 MODERATE EPISODE OF RECURRENT MAJOR DEPRESSIVE DISORDER (H): ICD-10-CM

## 2024-08-13 DIAGNOSIS — G47.00 INSOMNIA, UNSPECIFIED TYPE: ICD-10-CM

## 2024-08-13 DIAGNOSIS — E03.9 HYPOTHYROIDISM, UNSPECIFIED TYPE: ICD-10-CM

## 2024-08-13 RX ORDER — VENLAFAXINE HYDROCHLORIDE 75 MG/1
225 CAPSULE, EXTENDED RELEASE ORAL
Qty: 90 CAPSULE | Refills: 1 | Status: SHIPPED | OUTPATIENT
Start: 2024-08-13

## 2024-08-13 RX ORDER — LEVOTHYROXINE SODIUM 75 UG/1
75 TABLET ORAL DAILY
Qty: 30 TABLET | Refills: 1 | Status: SHIPPED | OUTPATIENT
Start: 2024-08-13

## 2024-08-13 RX ORDER — TRAZODONE HYDROCHLORIDE 50 MG/1
50 TABLET, FILM COATED ORAL AT BEDTIME
Qty: 30 TABLET | Refills: 1 | Status: SHIPPED | OUTPATIENT
Start: 2024-08-13

## 2024-09-20 DIAGNOSIS — F41.9 ANXIETY: ICD-10-CM

## 2024-09-23 RX ORDER — HYDROXYZINE HYDROCHLORIDE 25 MG/1
25 TABLET, FILM COATED ORAL EVERY 4 HOURS PRN
Qty: 90 TABLET | Refills: 1 | Status: SHIPPED | OUTPATIENT
Start: 2024-09-23